# Patient Record
Sex: FEMALE | Race: WHITE
[De-identification: names, ages, dates, MRNs, and addresses within clinical notes are randomized per-mention and may not be internally consistent; named-entity substitution may affect disease eponyms.]

---

## 2018-01-17 ENCOUNTER — HOSPITAL ENCOUNTER (EMERGENCY)
Dept: HOSPITAL 62 - ER | Age: 73
Discharge: HOME | End: 2018-01-17
Payer: MEDICARE

## 2018-01-17 VITALS — DIASTOLIC BLOOD PRESSURE: 67 MMHG | SYSTOLIC BLOOD PRESSURE: 168 MMHG

## 2018-01-17 DIAGNOSIS — D64.9: ICD-10-CM

## 2018-01-17 DIAGNOSIS — Z87.891: ICD-10-CM

## 2018-01-17 DIAGNOSIS — I50.9: ICD-10-CM

## 2018-01-17 DIAGNOSIS — I42.9: ICD-10-CM

## 2018-01-17 DIAGNOSIS — R05: ICD-10-CM

## 2018-01-17 DIAGNOSIS — J44.9: ICD-10-CM

## 2018-01-17 DIAGNOSIS — R06.00: Primary | ICD-10-CM

## 2018-01-17 LAB
A TYPE INFLUENZA AG: NEGATIVE
ADD MANUAL DIFF: NO
ALBUMIN SERPL-MCNC: 4.7 G/DL (ref 3.5–5)
ALP SERPL-CCNC: 53 U/L (ref 38–126)
ALT SERPL-CCNC: 32 U/L (ref 9–52)
ANION GAP SERPL CALC-SCNC: 10 MMOL/L (ref 5–19)
AST SERPL-CCNC: 38 U/L (ref 14–36)
B INFLUENZA AG: NEGATIVE
BASOPHILS # BLD AUTO: 0.1 10^3/UL (ref 0–0.2)
BASOPHILS NFR BLD AUTO: 0.9 % (ref 0–2)
BILIRUB DIRECT SERPL-MCNC: 0.4 MG/DL (ref 0–0.4)
BILIRUB SERPL-MCNC: 0.5 MG/DL (ref 0.2–1.3)
BUN SERPL-MCNC: 14 MG/DL (ref 7–20)
CALCIUM: 10.5 MG/DL (ref 8.4–10.2)
CHLORIDE SERPL-SCNC: 100 MMOL/L (ref 98–107)
CK MB SERPL-MCNC: 0.56 NG/ML (ref ?–4.55)
CO2 SERPL-SCNC: 30 MMOL/L (ref 22–30)
EOSINOPHIL # BLD AUTO: 0.1 10^3/UL (ref 0–0.6)
EOSINOPHIL NFR BLD AUTO: 1.9 % (ref 0–6)
ERYTHROCYTE [DISTWIDTH] IN BLOOD BY AUTOMATED COUNT: 13.8 % (ref 11.5–14)
GLUCOSE SERPL-MCNC: 103 MG/DL (ref 75–110)
HCT VFR BLD CALC: 37.1 % (ref 36–47)
HGB BLD-MCNC: 12.3 G/DL (ref 12–15.5)
LYMPHOCYTES # BLD AUTO: 0.6 10^3/UL (ref 0.5–4.7)
LYMPHOCYTES NFR BLD AUTO: 10 % (ref 13–45)
MCH RBC QN AUTO: 29.1 PG (ref 27–33.4)
MCHC RBC AUTO-ENTMCNC: 33.2 G/DL (ref 32–36)
MCV RBC AUTO: 88 FL (ref 80–97)
MONOCYTES # BLD AUTO: 0.8 10^3/UL (ref 0.1–1.4)
MONOCYTES NFR BLD AUTO: 13.5 % (ref 3–13)
NEUTROPHILS # BLD AUTO: 4.3 10^3/UL (ref 1.7–8.2)
NEUTS SEG NFR BLD AUTO: 73.7 % (ref 42–78)
NT PRO BNP: 1330 PG/ML (ref 5–900)
PLATELET # BLD: 218 10^3/UL (ref 150–450)
POTASSIUM SERPL-SCNC: 3.9 MMOL/L (ref 3.6–5)
PROT SERPL-MCNC: 7.2 G/DL (ref 6.3–8.2)
RBC # BLD AUTO: 4.24 10^6/UL (ref 3.72–5.28)
SODIUM SERPL-SCNC: 140.4 MMOL/L (ref 137–145)
TOTAL CELLS COUNTED % (AUTO): 100 %
TROPONIN I SERPL-MCNC: 0.04 NG/ML
WBC # BLD AUTO: 5.9 10^3/UL (ref 4–10.5)

## 2018-01-17 PROCEDURE — 71275 CT ANGIOGRAPHY CHEST: CPT

## 2018-01-17 PROCEDURE — 96365 THER/PROPH/DIAG IV INF INIT: CPT

## 2018-01-17 PROCEDURE — 93010 ELECTROCARDIOGRAM REPORT: CPT

## 2018-01-17 PROCEDURE — 94640 AIRWAY INHALATION TREATMENT: CPT

## 2018-01-17 PROCEDURE — 82553 CREATINE MB FRACTION: CPT

## 2018-01-17 PROCEDURE — 87804 INFLUENZA ASSAY W/OPTIC: CPT

## 2018-01-17 PROCEDURE — 99285 EMERGENCY DEPT VISIT HI MDM: CPT

## 2018-01-17 PROCEDURE — 84484 ASSAY OF TROPONIN QUANT: CPT

## 2018-01-17 PROCEDURE — 80053 COMPREHEN METABOLIC PANEL: CPT

## 2018-01-17 PROCEDURE — 71046 X-RAY EXAM CHEST 2 VIEWS: CPT

## 2018-01-17 PROCEDURE — 83880 ASSAY OF NATRIURETIC PEPTIDE: CPT

## 2018-01-17 PROCEDURE — 82550 ASSAY OF CK (CPK): CPT

## 2018-01-17 PROCEDURE — 87040 BLOOD CULTURE FOR BACTERIA: CPT

## 2018-01-17 PROCEDURE — 85025 COMPLETE CBC W/AUTO DIFF WBC: CPT

## 2018-01-17 PROCEDURE — 36415 COLL VENOUS BLD VENIPUNCTURE: CPT

## 2018-01-17 PROCEDURE — 87077 CULTURE AEROBIC IDENTIFY: CPT

## 2018-01-17 PROCEDURE — 93005 ELECTROCARDIOGRAM TRACING: CPT

## 2018-01-17 PROCEDURE — 87186 SC STD MICRODIL/AGAR DIL: CPT

## 2018-01-17 NOTE — RADIOLOGY REPORT (SQ)
EXAM DESCRIPTION:  CHEST PA/LAT



COMPLETED DATE/TIME:  1/17/2018 1:38 pm



REASON FOR STUDY:  congested cough, wheezes, left chest pain



COMPARISON:  None.



EXAM PARAMETERS:  NUMBER OF VIEWS: two views

TECHNIQUE: Digital Frontal and Lateral radiographic views of the chest acquired.

RADIATION DOSE: NA

LIMITATIONS: none



FINDINGS:  LUNGS AND PLEURA: There is ill-defined increased density in the right lung base which coul
d represent a developing infiltrate or atelectatic changes.  Remaining lung fields are clear.  No ple
ural effusions are identified.

MEDIASTINUM AND HILAR STRUCTURES: No masses or contour abnormalities.

HEART AND VASCULAR STRUCTURES: Heart normal size.  No evidence for failure.

BONES: No acute findings.

HARDWARE: AICD device is identified in position.

OTHER: No other significant finding.



IMPRESSION:  Ill-defined right basilar density which could represent a developing infiltrate or atele
ctatic changes.  Remaining lung fields are clear.



TECHNICAL DOCUMENTATION:  JOB ID:  [id number]

 2011 [employer]- All Rights Reserved

## 2018-01-17 NOTE — RADIOLOGY REPORT (SQ)
EXAM DESCRIPTION:  CTA CHEST



COMPLETED DATE/TIME:  1/17/2018 10:12 pm



REASON FOR STUDY:  sob



COMPARISON:  Recent radiographs.



TECHNIQUE:  CT scan of the chest performed using helical scanning technique with dynamic intravenous 
contrast injection.  Images reviewed with lung, soft tissue and bone windows.  Reconstructed coronal 
and sagittal MPR images reviewed.

Additional 3 dimensional post-processing performed to develop Maximal Intensity Projection images (MI
P).  All images stored on PACS.

All CT scanners at this facility use dose modulation, iterative reconstruction, and/or weight based d
osing when appropriate to reduce radiation dose to as low as reasonably achievable (ALARA).

[facility]: Dose Right  [facility]: CareDose    [facility]: Dose Right    [facility]: Teradose 4D    [facility]: Smart Technologies



CONTRAST TYPE AND DOSE:  contrast/concentration: Isovue 370.00 mg/ml; Total Contrast Delivered: 100.0
 ml; Total Saline Delivered: 60.0 ml

Contrast bolus adequate for pulmonary arteries and aorta.



RENAL FUNCTION:  Creatinine 0.95



RADIATION DOSE:  CT Rad equipment meets quality standard of care and radiation dose reduction techniq
ues were employed. CTDIvol: 9.9 - 15.0 mGy. DLP: 543 mGy-cm. .



LIMITATIONS:  None.



FINDINGS:  LUNGS AND PLEURA: 4 mm subpleural right lower lobe nodule.  Possible 6 mm left upper lobe 
nodule.  Mild motion artifact.  No pleural effusion or calcification.

AORTA AND GREAT VESSELS: No gross aortic dissection or aneurysm.  Ectatic ascending aorta at 3.7 cm.

HEART: Cardiac enlargement.  Coronary and valvular calcification.  No pericardial effusion.

PULMONARY ARTERIES: No emboli visualized in the main pulmonary arteries or the segmental branches.

HILAR AND MEDIASTINAL STRUCTURES: Nonspecific mild adenopathy in the mediastinum and hilar stations. 
 Nodes measure up to just over 1 cm in short axis.

HARDWARE: Left transvenous pacer.

UPPER ABDOMEN: No significant findings.  Limited exam.

THYROID AND OTHER SOFT TISSUES: No masses.  No adenopathy.

BONES: No acute or significant finding.

3D MIPS: Confirm above findings.

OTHER: No other significant finding.



IMPRESSION:  1. No pulmonary embolus detected.  No acute lung infiltrates.  2. Cardiac enlargement wi
th mild aortic ectasia but no aneurysm or dissection.  3. Pulmonary nodules measuring up to 6 mm.  Fo
llow-up guidelines are noted below.

 F[place]ISCHNER CRITERIA FOR FOLLOW-UP OF PULMONARY NODULES

Incidentally detected new nodules in persons 35 or older.

HIGH RISK: History of smoking or other known risk factors.

6-8mm single solid nodule: LOW RISK: CT 6-12 mo; then consider CT 18-24 mo. HIGH RISK: CT 6-12 mo; th
en CT 18-24 mo.



COMMENT:  Quality ID # [id number]: Final reports with documentation of one or more dose reduction techniques
 (e.g., Automated exposure control, adjustment of the mA and/or kV according to patient size, use of 
iterative reconstruction technique)



TECHNICAL DOCUMENTATION:  JOB ID:  [id number]

 2011 [employer]- All Rights Reserved

## 2018-01-17 NOTE — ER DOCUMENT REPORT
ED General





- General


Chief Complaint: Breathing Difficulty


Stated Complaint: DIFFICULTY BREATHING WHEEZING


Time Seen by Provider: 01/17/18 13:17


Mode of Arrival: Ambulatory


Information source: Patient


Notes: 





This is a 72-year-old female with a history of CHF, cardiomyopathy (status post 

ICD), mild COPD, anemia.  Patient presents to the emergency room because of 

cough which is been nonproductive, shortness of breath over the last day, 

wheezing subjective chills.  Patient states that she had a very difficult time 

breathing last night.


TRAVEL OUTSIDE OF THE [place] IN LAST 30 DAYS: No





- HPI


Onset: Last week


Onset/Duration: Gradual


Quality of pain: No pain


Severity: None


Pain Level: Denies


Associated symptoms: Nonproductive cough, Shortness of breath.  denies: Fever


Exacerbated by: Denies


Relieved by: Denies


Similar symptoms previously: Yes


Recently seen / treated by doctor: No





- Related Data


Allergies/Adverse Reactions: 


 





levofloxacin [From Levaquin] Allergy (Severe, Verified 01/17/18 13:06)


 Tachycardia


gabapentin [From Neurontin] Allergy (Intermediate, Verified 01/17/18 13:06)


 CONFUSED/TREMORS


polymyxin B sulfate [From Polysporin] Allergy (Mild, Verified 01/17/18 13:06)


 IRRITATION, HIVES











Past Medical History





- General


Information source: Patient





- Social History


Smoking Status: Former Smoker


Cigarette use (# per day): No


Chew tobacco use (# tins/day): No


Frequency of alcohol use: None


Drug Abuse: None


Lives with: Family


Family History: Reviewed & Not Pertinent


Patient has suicidal ideation: No


Patient has homicidal ideation: No





- Past Medical History


Cardiac Medical History: Reports: Hx Atrial Fibrillation, Hx Congestive Heart 

Failure, Hx Hypercholesterolemia, Hx Hypertension - MEDS


   Denies: Hx Heart Attack


Pulmonary Medical History: 


   Denies: Hx Asthma


Neurological Medical History: Denies: Hx Cerebrovascular Accident, Hx Seizures


Renal/ Medical History: Denies: Hx Peritoneal Dialysis


GI Medical History: Reports: Hx Gastroesophageal Reflux Disease, Hx Hiatal 

Hernia - YEARS AGO.  Denies: Hx Hepatitis, Hx Ulcer


Infectious Medical History: Denies: Hx Hepatitis


Past Surgical History: Reports: Hx Hysterectomy, Hx Pacemaker - 2008.  Denies: 

Hx Mastectomy, Hx Open Heart Surgery





- Immunizations


Hx Diphtheria, Pertussis, Tetanus Vaccination: Yes





Review of Systems





- Review of Systems


Constitutional: Chills.  denies: Fever


EENT: No symptoms reported


Cardiovascular: See HPI


Respiratory: Cough, Short of breath


Gastrointestinal: No symptoms reported


Genitourinary: No symptoms reported


Female Genitourinary: No symptoms reported


Musculoskeletal: No symptoms reported


Skin: No symptoms reported


Hematologic/Lymphatic: No symptoms reported


Neurological/Psychological: No symptoms reported





Physical Exam





- Vital signs


Vitals: 


 











Temp Pulse Resp BP Pulse Ox


 


 98.3 F   71   17   141/67 H  94 


 


 01/17/18 12:07  01/17/18 12:07  01/17/18 12:07  01/17/18 12:07  01/17/18 12:07











Notes: 





Physical exam:


 


GENERAL: 72-year-old female, alert and oriented 3, no acute distress





HEAD: Atraumatic, normocephalic.





EYES: Pupils equal round and reactive to light, extraocular movements intact, 

sclera anicteric, conjunctiva are normal.





ENT: TMs normal, nares patent, oropharynx clear without exudates.  Moist mucous 

membranes.





NECK: Normal range of motion, supple without obvious mass or JVD.





LUNGS: Wheezes bilaterally





HEART: Regular rate and rhythm without murmurs, rubs or gallops.





ABDOMEN: Soft, normoactive bowel sounds.  No tenderness to palpation.  No 

guarding, no rebound.  No masses appreciated.





EXTREMITIES: Normal range of motion, no pitting or edema.  No clubbing or 

cyanosis.





NEUROLOGICAL: Cranial nerves II through XII grossly intact.  Normal speech, 

moving all extremities.





PSYCH: Normal mood, normal affect.





SKIN: Warm, Dry, normal turgor, no rashes or lesions noted.





Course





- Re-evaluation


Re-evalutation: 





01/17/18 23:03


Note: The patient states she is feeling better.  I did give her a gram of IV 

ceftriaxone for the possibility of pneumonia.  CTA of the chest shows no 

pulmonary emboli and no pneumonia.  There are some pulmonary nodules which are 

going to need to be followed.  I discussed this with her and her son and I will 

give her a copy of today's CTA scan to bring to Dr. lynn's office.  I will 

give her a copy of all other medicines.





- Vital Signs


Vital signs: 


 











Temp Pulse Resp BP Pulse Ox


 


 98.6 F   64   16   168/67 H  96 


 


 01/17/18 23:22  01/17/18 16:56  01/17/18 23:22  01/17/18 23:22  01/17/18 23:22














- Laboratory


Result Diagrams: 


 01/17/18 13:48





 01/17/18 13:48


Laboratory results interpreted by me: 


 











  01/17/18 01/17/18 01/17/18





  13:48 13:48 13:48


 


Lymphocytes %  10.0 L  


 


Monocytes %  13.5 H  


 


Est GFR (Non-Af Amer)   58 L 


 


Calcium   10.5 H 


 


AST   38 H 


 


NT-Pro-B Natriuret Pep    1330 H














- Diagnostic Test


Radiology reviewed: Image reviewed, Reports reviewed - PA shows no pulmonary 

emboli.





Discharge





- Discharge


Clinical Impression: 


 Dyspnea





Condition: Stable


Disposition: HOME, SELF-CARE


Additional Instructions: 


As we discussed, the CT showed no evidence of pneumonia.  Her, I do want you to 

take the antibiotic to cover you for respiratory infection.  He did receive IV 

ceftriaxone in the ER and that will last 24 hours.  I want you to continue your 

current medicines.


Follow-up with your primary care doctor and bring a copy of the labs and CT 

report with you.


As we discussed, the CT scan did not show any obvious pneumonia and it did not 

show any blood clots.  It did show an enlarged heart which we knew you had.  It 

did show some pulmonary nodules what that you are going to have to follow-up in 

6 months.





Prescriptions: 


Doxycycline Hyclate 100 mg PO BID #20 capsule


Referrals: 


OLIVA HESS MD [Primary Care Provider] - Follow up as needed

## 2018-01-17 NOTE — ER DOCUMENT REPORT
ED Medical Screen (RME)





- General


Chief Complaint: Breathing Difficulty


Stated Complaint: DIFFICULTY BREATHING WHEEZING


Time Seen by Provider: 01/17/18 13:17


Notes: 





72-year-old female patient reports onset last night wheezing cough congestion 

trouble breathing and pleuritic pain in her left chest.  Has not checked for 

fever.


Brief exam shows some rhonchi in the left lung on forced cough.  There is 

minimal wheeze.





I have greeted and performed a rapid initial assessment of this patient.  A 

comprehensive ED assessment and evaluation of the patient, analysis of test 

results and completion of the medical decision making process will be conducted 

by additional ED providers.


TRAVEL OUTSIDE OF THE [place] IN LAST 30 DAYS: No





- Related Data


Allergies/Adverse Reactions: 


 





levofloxacin [From Levaquin] Allergy (Severe, Verified 01/17/18 13:06)


 Tachycardia


gabapentin [From Neurontin] Allergy (Intermediate, Verified 01/17/18 13:06)


 CONFUSED/TREMORS


polymyxin B sulfate [From Polysporin] Allergy (Mild, Verified 01/17/18 13:06)


 IRRITATION, HIVES











Past Medical History





- Social History


Chew tobacco use (# tins/day): No


Frequency of alcohol use: None


Drug Abuse: None





- Past Medical History


Cardiac Medical History: Reports: Hx Atrial Fibrillation, Hx Congestive Heart 

Failure, Hx Hypercholesterolemia, Hx Hypertension - MEDS


   Denies: Hx Heart Attack


Pulmonary Medical History: 


   Denies: Hx Asthma


Neurological Medical History: Denies: Hx Cerebrovascular Accident, Hx Seizures


Renal/ Medical History: Denies: Hx Peritoneal Dialysis


GI Medical History: Reports: Hx Gastroesophageal Reflux Disease, Hx Hiatal 

Hernia - YEARS AGO.  Denies: Hx Hepatitis, Hx Ulcer


Infectious Medical History: Denies: Hx Hepatitis


Past Surgical History: Reports: Hx Hysterectomy, Hx Pacemaker - 2008.  Denies: 

Hx Mastectomy, Hx Open Heart Surgery





- Immunizations


Hx Diphtheria, Pertussis, Tetanus Vaccination: Yes





Physical Exam





- Vital signs


Vitals: 


 











Temp Pulse Resp BP Pulse Ox


 


 98.3 F   71   17   141/67 H  94 


 


 01/17/18 12:07  01/17/18 12:07  01/17/18 12:07  01/17/18 12:07  01/17/18 12:07














Course





- Vital Signs


Vital signs: 


 











Temp Pulse Resp BP Pulse Ox


 


 98.3 F   71   17   141/67 H  94 


 


 01/17/18 12:07  01/17/18 12:07  01/17/18 12:07  01/17/18 12:07  01/17/18 12:07

## 2018-01-17 NOTE — EKG REPORT
SEVERITY:- ABNORMAL ECG -

ATRIAL-SENSED VENTRICULAR-PACED COMPLEXES

LVH WITH IVCD, LAD AND SECONDARY REPOL ABNRM

:

Confirmed by: Jet Pal 17-Jan-2018 23:24:57

## 2018-09-18 ENCOUNTER — HOSPITAL ENCOUNTER (INPATIENT)
Dept: HOSPITAL 62 - ER | Age: 73
LOS: 3 days | Discharge: HOME | DRG: 603 | End: 2018-09-21
Attending: INTERNAL MEDICINE | Admitting: INTERNAL MEDICINE
Payer: MEDICARE

## 2018-09-18 DIAGNOSIS — Z87.891: ICD-10-CM

## 2018-09-18 DIAGNOSIS — K21.9: ICD-10-CM

## 2018-09-18 DIAGNOSIS — G89.29: ICD-10-CM

## 2018-09-18 DIAGNOSIS — E03.9: ICD-10-CM

## 2018-09-18 DIAGNOSIS — Z90.49: ICD-10-CM

## 2018-09-18 DIAGNOSIS — Z80.8: ICD-10-CM

## 2018-09-18 DIAGNOSIS — Z79.82: ICD-10-CM
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
## 2018-09-18 DIAGNOSIS — Z90.710: ICD-10-CM

## 2018-09-18 DIAGNOSIS — I44.7: ICD-10-CM

## 2018-09-18 DIAGNOSIS — Z82.3: ICD-10-CM

## 2018-09-18 DIAGNOSIS — S61.452A: ICD-10-CM

## 2018-09-18 DIAGNOSIS — I50.42: ICD-10-CM

## 2018-09-18 DIAGNOSIS — Z82.49: ICD-10-CM

## 2018-09-18 DIAGNOSIS — Z88.6: ICD-10-CM

## 2018-09-18 DIAGNOSIS — L03.114: Primary | ICD-10-CM

## 2018-09-18 DIAGNOSIS — M19.90: ICD-10-CM

## 2018-09-18 DIAGNOSIS — E78.00: ICD-10-CM

## 2018-09-18 DIAGNOSIS — Z88.8: ICD-10-CM

## 2018-09-18 DIAGNOSIS — Z95.0: ICD-10-CM

## 2018-09-18 DIAGNOSIS — M54.9: ICD-10-CM

## 2018-09-18 DIAGNOSIS — J44.9: ICD-10-CM

## 2018-09-18 DIAGNOSIS — Z88.3: ICD-10-CM

## 2018-09-18 DIAGNOSIS — I11.0: ICD-10-CM

## 2018-09-18 DIAGNOSIS — Z79.899: ICD-10-CM

## 2018-09-18 LAB
ADD MANUAL DIFF: NO
ANION GAP SERPL CALC-SCNC: 10 MMOL/L (ref 5–19)
BASOPHILS # BLD AUTO: 0.1 10^3/UL (ref 0–0.2)
BASOPHILS NFR BLD AUTO: 0.8 % (ref 0–2)
BUN SERPL-MCNC: 24 MG/DL (ref 7–20)
CALCIUM: 10 MG/DL (ref 8.4–10.2)
CHLORIDE SERPL-SCNC: 99 MMOL/L (ref 98–107)
CO2 SERPL-SCNC: 29 MMOL/L (ref 22–30)
EOSINOPHIL # BLD AUTO: 0.3 10^3/UL (ref 0–0.6)
EOSINOPHIL NFR BLD AUTO: 3 % (ref 0–6)
ERYTHROCYTE [DISTWIDTH] IN BLOOD BY AUTOMATED COUNT: 15.7 % (ref 11.5–14)
GLUCOSE SERPL-MCNC: 98 MG/DL (ref 75–110)
HCT VFR BLD CALC: 28.5 % (ref 36–47)
HGB BLD-MCNC: 8.7 G/DL (ref 12–15.5)
LYMPHOCYTES # BLD AUTO: 1.4 10^3/UL (ref 0.5–4.7)
LYMPHOCYTES NFR BLD AUTO: 12.8 % (ref 13–45)
MCH RBC QN AUTO: 22.2 PG (ref 27–33.4)
MCHC RBC AUTO-ENTMCNC: 30.5 G/DL (ref 32–36)
MCV RBC AUTO: 73 FL (ref 80–97)
MONOCYTES # BLD AUTO: 1.1 10^3/UL (ref 0.1–1.4)
MONOCYTES NFR BLD AUTO: 9.7 % (ref 3–13)
NEUTROPHILS # BLD AUTO: 8.2 10^3/UL (ref 1.7–8.2)
NEUTS SEG NFR BLD AUTO: 73.7 % (ref 42–78)
PLATELET # BLD: 223 10^3/UL (ref 150–450)
POTASSIUM SERPL-SCNC: 4.4 MMOL/L (ref 3.6–5)
RBC # BLD AUTO: 3.91 10^6/UL (ref 3.72–5.28)
SODIUM SERPL-SCNC: 138.3 MMOL/L (ref 137–145)
TOTAL CELLS COUNTED % (AUTO): 100 %
WBC # BLD AUTO: 11.1 10^3/UL (ref 4–10.5)

## 2018-09-18 PROCEDURE — 84443 ASSAY THYROID STIM HORMONE: CPT

## 2018-09-18 PROCEDURE — 90471 IMMUNIZATION ADMIN: CPT

## 2018-09-18 PROCEDURE — 99284 EMERGENCY DEPT VISIT MOD MDM: CPT

## 2018-09-18 PROCEDURE — 87040 BLOOD CULTURE FOR BACTERIA: CPT

## 2018-09-18 PROCEDURE — 3E0234Z INTRODUCTION OF SERUM, TOXOID AND VACCINE INTO MUSCLE, PERCUTANEOUS APPROACH: ICD-10-PCS | Performed by: FAMILY MEDICINE

## 2018-09-18 PROCEDURE — 96374 THER/PROPH/DIAG INJ IV PUSH: CPT

## 2018-09-18 PROCEDURE — 36415 COLL VENOUS BLD VENIPUNCTURE: CPT

## 2018-09-18 PROCEDURE — 90715 TDAP VACCINE 7 YRS/> IM: CPT

## 2018-09-18 PROCEDURE — 85025 COMPLETE CBC W/AUTO DIFF WBC: CPT

## 2018-09-18 PROCEDURE — 80048 BASIC METABOLIC PNL TOTAL CA: CPT

## 2018-09-18 RX ADMIN — BENAZEPRIL HYDROCHLORIDE SCH MG: 5 TABLET ORAL at 22:29

## 2018-09-18 RX ADMIN — OXYCODONE HYDROCHLORIDE SCH MG: 10 TABLET, FILM COATED, EXTENDED RELEASE ORAL at 22:29

## 2018-09-18 RX ADMIN — TEMAZEPAM SCH MG: 15 CAPSULE ORAL at 22:28

## 2018-09-18 RX ADMIN — METOCLOPRAMIDE SCH MG: 5 INJECTION, SOLUTION INTRAMUSCULAR; INTRAVENOUS at 22:31

## 2018-09-18 RX ADMIN — Medication SCH ML: at 22:32

## 2018-09-18 RX ADMIN — POTASSIUM CHLORIDE SCH MEQ: 750 TABLET, FILM COATED, EXTENDED RELEASE ORAL at 22:28

## 2018-09-18 NOTE — ER DOCUMENT REPORT
ED General





- General


Chief Complaint: Cat Bite


Stated Complaint: CAT BITE/LEFT HAND REDNESS, SWELLING


Time Seen by Provider: 09/18/18 16:53


Mode of Arrival: Ambulatory


Information source: Patient, [facility] Records


Notes: 


73-year-old female with congestive heart failure, hypertension, cardiomyopathy 

presents with complaint of left hand pain, swelling and erythema.  Patient 

states that she was bit by her cat yesterday and awoke this morning with pain 

and swelling.  Cat is reported to be fully vaccinated.  Patient is right-hand 

dominant.  She denies any associated fever, chills, nausea, vomiting.  


TRAVEL OUTSIDE OF THE [place] IN LAST 30 DAYS: No





- HPI


Onset: Yesterday


Onset/Duration: Sudden


Quality of pain: Achy, Throbbing


Severity: Mild


Associated symptoms: denies: Chest pain, Fever, Nausea


Exacerbated by: Denies


Relieved by: Denies


Similar symptoms previously: No


Recently seen / treated by doctor: No





- Related Data


Allergies/Adverse Reactions: 


 





levofloxacin [From Levaquin] Allergy (Severe, Verified 09/18/18 15:04)


 Tachycardia


gabapentin [From Neurontin] Allergy (Intermediate, Verified 09/18/18 15:04)


 CONFUSED/TREMORS


polymyxin B sulfate [From Polysporin] Allergy (Mild, Verified 09/18/18 15:04)


 IRRITATION, HIVES











Past Medical History





- General


Information source: Patient





- Social History


Smoking Status: Former Smoker


Chew tobacco use (# tins/day): No


Frequency of alcohol use: None


Drug Abuse: None


Lives with: Family


Family History: Reviewed & Not Pertinent


Patient has suicidal ideation: No


Patient has homicidal ideation: No





- Past Medical History


Cardiac Medical History: Reports: Hx Atrial Fibrillation, Hx Congestive Heart 

Failure, Hx Hypercholesterolemia, Hx Hypertension - MEDS


   Denies: Hx Heart Attack


Pulmonary Medical History: 


   Denies: Hx Asthma


Neurological Medical History: Denies: Hx Cerebrovascular Accident, Hx Seizures


Renal/ Medical History: Denies: Hx Peritoneal Dialysis


GI Medical History: Reports: Hx Gastroesophageal Reflux Disease, Hx Hiatal 

Hernia - YEARS AGO.  Denies: Hx Hepatitis, Hx Ulcer


Musculoskeletal Medical History: Reports Hx Arthritis


Infectious Medical History: Denies: Hx Hepatitis


Past Surgical History: Reports: Hx Cardiac Surgery - ICD PACER/DEFIB [facility]

, Hx Hysterectomy, Hx Orthopedic Surgery - BACK, Hx Pacemaker - 2008.  Denies: 

Hx Mastectomy, Hx Open Heart Surgery





- Immunizations


Hx Diphtheria, Pertussis, Tetanus Vaccination: Yes





Review of Systems





- Review of Systems


Notes: 





REVIEW OF SYSTEMS:


CONSTITUTIONAL :  Denies fever,  chills, or sweats.  Denies recent illness. 

Denies weight loss, recent hospitalizations. 


EENT: Denies visual changes, eye pain.  Denies sore throat, oral lesions, 

difficulty swallowing.


CARDIOVASCULAR:  Denies chest pain.  Denies palpitations. Denies lower 

extremity edema.


RESPIRATORY:  Denies cough. Denies shortness of breath, wheezing.


GASTROINTESTINAL:  Denies abdominal pain or distention.  Denies nausea, vomiting

, or diarrhea.  Denies blood in vomitus, stools, or per rectum.  Denies black, 

tarry stools.  Denies constipation.  


GENITOURINARY:  Denies difficulty urinating, painful urination,  frequency, 

blood in urine, or  vaginal discharge.


MUSCULOSKELETAL:  Denies back or neck pain or stiffness.  Denies joint pain or 

swelling.


SKIN:   Denies rash, lesions or sores.


HEMATOLOGIC :   Denies easy bruising or bleeding.


LYMPHATIC:  Denies swollen glands.


NEUROLOGICAL:  Denies confusion or altered mental status.  Denies loss of 

consciousness.  Denies dizziness or lightheadedness.  Denies headache.  Denies 

problems difficulty with ambulation, slurred speech.  Denies sensory loss, 

numbness, or tingling.  Denies seizures.


PSYCHIATRIC:  Denies anxiety or stress.  Denies depression, suicidal ideation, 

or homicidal ideation.  Denies visual or auditory hallucinations.








Physical Exam





- Vital signs


Vitals: 


 











Temp Pulse Resp BP Pulse Ox


 


 98.3 F   67   20   138/59 H  99 


 


 09/18/18 15:40  09/18/18 15:40  09/18/18 15:40  09/18/18 15:40  09/18/18 15:40











Interpretation: Hypertensive.  No: Febrile





- Notes


Notes: 





PHYSICAL EXAMINATION:





GENERAL: Well-appearing, well-nourished and in no acute distress.





HEAD: Atraumatic, normocephalic.





EYES: Pupils equal round and reactive to light, extraocular movements intact, 

conjunctiva are normal.





ENT: Nares patent, oropharynx clear without exudates.  Moist mucous membranes.





NECK: Normal range of motion, supple without lymphadenopathy





LUNGS: Breath sounds clear to auscultation bilaterally and equal.  No wheezes 

rales or rhonchi.





HEART: Regular rate and rhythm without murmurs





ABDOMEN: Soft, nontender, nondistended abdomen.  No guarding, no rebound.  No 

masses appreciated.





Female : deferred





Musculoskeletal: Normal range of motion, no pitting or edema.  No cyanosis.  

Left hand-erythematous, swelling with streaking up the left forearm.





NEUROLOGICAL: Cranial nerves grossly intact.  Normal speech, normal gait.  

Normal sensory, motor exams





PSYCH: Normal mood, normal affect.





SKIN: Warm, Dry, normal turgor, no rashes or lesions noted.





Course





- Re-evaluation


Re-evalutation: 








Laboratory











  09/18/18 09/18/18





  18:00 18:00


 


WBC  11.1 H 


 


RBC  3.91 


 


Hgb  8.7 L 


 


Hct  28.5 L 


 


MCV  73 L 


 


MCH  22.2 L 


 


MCHC  30.5 L 


 


RDW  15.7 H 


 


Plt Count  223 


 


Seg Neutrophils %  73.7 


 


Lymphocytes %  12.8 L 


 


Monocytes %  9.7 


 


Eosinophils %  3.0 


 


Basophils %  0.8 


 


Absolute Neutrophils  8.2 


 


Absolute Lymphocytes  1.4 


 


Absolute Monocytes  1.1 


 


Absolute Eosinophils  0.3 


 


Absolute Basophils  0.1 


 


Sodium   138.3


 


Potassium   4.4


 


Chloride   99


 


Carbon Dioxide   29


 


Anion Gap   10


 


BUN   24 H


 


Creatinine   1.19


 


Est GFR ( Amer)   54 L


 


Est GFR (Non-Af Amer)   44 L


 


Glucose   98


 


Calcium   10.0











09/18/18 21:24


73-year-old female presents after being bitten by her cat yesterday.  On exam 

patient has extensive cellulitis including the hand, wrist and forearm.  

Patient was administered Zosyn from the provider in triage.  CBC is without 

leukocytosis, does show anemia.  BMP is without electrolyte abnormality.  She 

is agreeable with admission to the hospital, for IV antibiotics.  Patient 

accepted by Dr. Sprague for admission


09/19/18 01:15





09/19/18 01:15








- Vital Signs


Vital signs: 


 











Temp Pulse Resp BP Pulse Ox


 


 98.3 F   72   18   157/87 H  100 


 


 09/18/18 20:39  09/18/18 20:39  09/18/18 20:39  09/18/18 20:39  09/18/18 20:39














- Laboratory


Result Diagrams: 


 09/18/18 18:00





 09/18/18 18:00


Laboratory results interpreted by me: 


 











  09/18/18 09/18/18





  18:00 18:00


 


WBC  11.1 H 


 


Hgb  8.7 L 


 


Hct  28.5 L 


 


MCV  73 L 


 


MCH  22.2 L 


 


MCHC  30.5 L 


 


RDW  15.7 H 


 


Lymphocytes %  12.8 L 


 


BUN   24 H


 


Est GFR ( Amer)   54 L


 


Est GFR (Non-Af Amer)   44 L














Discharge





- Discharge


Clinical Impression: 


 Cellulitis of left arm, Swelling of left hand





Cat bite of hand


Qualifiers:


 Encounter type: initial encounter Laterality: left Qualified Code(s): S61.452A 

- Open bite of left hand, initial encounter





Condition: Good


Disposition: ADMITTED AS INPATIENT


Admitting Provider: Hospitalist


Unit Admitted: Medical Floor

## 2018-09-18 NOTE — ER DOCUMENT REPORT
ED Medical Screen (RME)





- General


Chief Complaint: Cat Bite


Stated Complaint: CAT BITE/LEFT HAND REDNESS, SWELLING


Time Seen by Provider: 09/18/18 16:53


Mode of Arrival: Ambulatory


Information source: Patient


Notes: 





Patient states that she was bit by her cat yesterday.  Patient complains of 

increased pain, swelling and redness to left arm.  Patient reports cat's 

immunizations are up-to-date.





hx: CHF, hypertension





I have greeted and performed a rapid initial assessment of this patient.  A 

comprehensive ED assessment and evaluation of the patient, analysis of test 

results and completion of the medical decision making process will be conducted 

by additional ED providers.


TRAVEL OUTSIDE OF THE [place] IN LAST 30 DAYS: No





- Related Data


Allergies/Adverse Reactions: 


 





levofloxacin [From Levaquin] Allergy (Severe, Verified 09/18/18 15:04)


 Tachycardia


gabapentin [From Neurontin] Allergy (Intermediate, Verified 09/18/18 15:04)


 CONFUSED/TREMORS


polymyxin B sulfate [From Polysporin] Allergy (Mild, Verified 09/18/18 15:04)


 IRRITATION, HIVES











Past Medical History





- Past Medical History


Cardiac Medical History: Reports: Hx Atrial Fibrillation, Hx Congestive Heart 

Failure, Hx Hypercholesterolemia, Hx Hypertension - MEDS


   Denies: Hx Heart Attack


Pulmonary Medical History: 


   Denies: Hx Asthma


Neurological Medical History: Denies: Hx Cerebrovascular Accident, Hx Seizures


Renal/ Medical History: Denies: Hx Peritoneal Dialysis


GI Medical History: Reports: Hx Gastroesophageal Reflux Disease, Hx Hiatal 

Hernia - YEARS AGO.  Denies: Hx Hepatitis, Hx Ulcer


Infectious Medical History: Denies: Hx Hepatitis


Past Surgical History: Reports: Hx Hysterectomy, Hx Pacemaker - 2008.  Denies: 

Hx Mastectomy, Hx Open Heart Surgery





- Immunizations


Hx Diphtheria, Pertussis, Tetanus Vaccination: Yes





Physical Exam





- Vital signs


Vitals: 





 











Temp Pulse Resp BP Pulse Ox


 


 98.3 F   67   20   138/59 H  99 


 


 09/18/18 15:40  09/18/18 15:40  09/18/18 15:40  09/18/18 15:40  09/18/18 15:40














- Extremities


General upper extremity: Tender - Tenderness to left hand and forearm.  Patient 

with bite to dorsal aspect of left hand with lymphangitis extending up left 

forearm and left upper arm.





Course





- Vital Signs


Vital signs: 





 











Temp Pulse Resp BP Pulse Ox


 


 98.3 F   67   20   138/59 H  99 


 


 09/18/18 15:40  09/18/18 15:40  09/18/18 15:40  09/18/18 15:40  09/18/18 15:40














Doctor's Discharge





- Discharge


Referrals: 


OLIVA HESS MD [Primary Care Provider] - Follow up as needed

## 2018-09-19 LAB
ADD MANUAL DIFF: NO
ANION GAP SERPL CALC-SCNC: 8 MMOL/L (ref 5–19)
BASOPHILS # BLD AUTO: 0.1 10^3/UL (ref 0–0.2)
BASOPHILS NFR BLD AUTO: 1 % (ref 0–2)
BUN SERPL-MCNC: 19 MG/DL (ref 7–20)
CALCIUM: 9.3 MG/DL (ref 8.4–10.2)
CHLORIDE SERPL-SCNC: 102 MMOL/L (ref 98–107)
CO2 SERPL-SCNC: 30 MMOL/L (ref 22–30)
EOSINOPHIL # BLD AUTO: 0.3 10^3/UL (ref 0–0.6)
EOSINOPHIL NFR BLD AUTO: 4.7 % (ref 0–6)
ERYTHROCYTE [DISTWIDTH] IN BLOOD BY AUTOMATED COUNT: 15.7 % (ref 11.5–14)
GLUCOSE SERPL-MCNC: 111 MG/DL (ref 75–110)
HCT VFR BLD CALC: 26.1 % (ref 36–47)
HGB BLD-MCNC: 8.2 G/DL (ref 12–15.5)
LYMPHOCYTES # BLD AUTO: 1.3 10^3/UL (ref 0.5–4.7)
LYMPHOCYTES NFR BLD AUTO: 17.6 % (ref 13–45)
MCH RBC QN AUTO: 22.7 PG (ref 27–33.4)
MCHC RBC AUTO-ENTMCNC: 31.6 G/DL (ref 32–36)
MCV RBC AUTO: 72 FL (ref 80–97)
MONOCYTES # BLD AUTO: 1 10^3/UL (ref 0.1–1.4)
MONOCYTES NFR BLD AUTO: 14.3 % (ref 3–13)
NEUTROPHILS # BLD AUTO: 4.5 10^3/UL (ref 1.7–8.2)
NEUTS SEG NFR BLD AUTO: 62.4 % (ref 42–78)
PLATELET # BLD: 191 10^3/UL (ref 150–450)
POTASSIUM SERPL-SCNC: 3.8 MMOL/L (ref 3.6–5)
RBC # BLD AUTO: 3.63 10^6/UL (ref 3.72–5.28)
SODIUM SERPL-SCNC: 139.7 MMOL/L (ref 137–145)
TOTAL CELLS COUNTED % (AUTO): 100 %
WBC # BLD AUTO: 7.2 10^3/UL (ref 4–10.5)

## 2018-09-19 RX ADMIN — CHLORTHALIDONE SCH MG: 25 TABLET ORAL at 10:19

## 2018-09-19 RX ADMIN — PREGABALIN SCH MG: 75 CAPSULE ORAL at 10:01

## 2018-09-19 RX ADMIN — POLYETHYLENE GLYCOL 3350 SCH GM: 17 POWDER, FOR SOLUTION ORAL at 10:01

## 2018-09-19 RX ADMIN — OXYCODONE AND ACETAMINOPHEN PRN TAB: 5; 325 TABLET ORAL at 13:10

## 2018-09-19 RX ADMIN — FENOFIBRATE SCH MG: 145 TABLET ORAL at 10:18

## 2018-09-19 RX ADMIN — ASPIRIN SCH MG: 81 TABLET, COATED ORAL at 10:01

## 2018-09-19 RX ADMIN — CARVEDILOL SCH MG: 12.5 TABLET, FILM COATED ORAL at 10:18

## 2018-09-19 RX ADMIN — CETIRIZINE HYDROCHLORIDE SCH MG: 10 TABLET, FILM COATED ORAL at 22:00

## 2018-09-19 RX ADMIN — SIMVASTATIN SCH MG: 40 TABLET, FILM COATED ORAL at 10:01

## 2018-09-19 RX ADMIN — ENOXAPARIN SODIUM SCH MG: 40 INJECTION SUBCUTANEOUS at 10:02

## 2018-09-19 RX ADMIN — CARVEDILOL SCH: 12.5 TABLET, FILM COATED ORAL at 02:19

## 2018-09-19 RX ADMIN — OXYCODONE HYDROCHLORIDE SCH MG: 10 TABLET, FILM COATED, EXTENDED RELEASE ORAL at 10:03

## 2018-09-19 RX ADMIN — AMPICILLIN SODIUM AND SULBACTAM SODIUM SCH MLS/HR: 2; 1 INJECTION, POWDER, FOR SOLUTION INTRAMUSCULAR; INTRAVENOUS at 21:44

## 2018-09-19 RX ADMIN — POTASSIUM CHLORIDE SCH MEQ: 750 TABLET, FILM COATED, EXTENDED RELEASE ORAL at 21:45

## 2018-09-19 RX ADMIN — AMPICILLIN SODIUM AND SULBACTAM SODIUM SCH MLS/HR: 2; 1 INJECTION, POWDER, FOR SOLUTION INTRAMUSCULAR; INTRAVENOUS at 13:09

## 2018-09-19 RX ADMIN — METOCLOPRAMIDE SCH: 5 INJECTION, SOLUTION INTRAMUSCULAR; INTRAVENOUS at 10:21

## 2018-09-19 RX ADMIN — CARVEDILOL SCH MG: 12.5 TABLET, FILM COATED ORAL at 21:45

## 2018-09-19 RX ADMIN — Medication SCH ML: at 16:48

## 2018-09-19 RX ADMIN — METOCLOPRAMIDE SCH MG: 5 INJECTION, SOLUTION INTRAMUSCULAR; INTRAVENOUS at 21:46

## 2018-09-19 RX ADMIN — BENAZEPRIL HYDROCHLORIDE SCH MG: 5 TABLET ORAL at 22:00

## 2018-09-19 RX ADMIN — CETIRIZINE HYDROCHLORIDE SCH: 10 TABLET, FILM COATED ORAL at 02:19

## 2018-09-19 RX ADMIN — OXYCODONE AND ACETAMINOPHEN PRN TAB: 5; 325 TABLET ORAL at 04:10

## 2018-09-19 RX ADMIN — AMPICILLIN SODIUM AND SULBACTAM SODIUM SCH GM: 2; 1 INJECTION, POWDER, FOR SOLUTION INTRAMUSCULAR; INTRAVENOUS at 05:37

## 2018-09-19 RX ADMIN — TEMAZEPAM SCH MG: 15 CAPSULE ORAL at 21:46

## 2018-09-19 RX ADMIN — METOCLOPRAMIDE SCH MG: 5 INJECTION, SOLUTION INTRAMUSCULAR; INTRAVENOUS at 10:01

## 2018-09-19 RX ADMIN — Medication SCH ML: at 21:46

## 2018-09-19 RX ADMIN — Medication SCH ML: at 05:36

## 2018-09-19 RX ADMIN — AMPICILLIN SODIUM AND SULBACTAM SODIUM SCH: 2; 1 INJECTION, POWDER, FOR SOLUTION INTRAMUSCULAR; INTRAVENOUS at 06:24

## 2018-09-19 RX ADMIN — METOCLOPRAMIDE SCH MG: 5 INJECTION, SOLUTION INTRAMUSCULAR; INTRAVENOUS at 16:48

## 2018-09-19 RX ADMIN — OXYCODONE HYDROCHLORIDE SCH MG: 10 TABLET, FILM COATED, EXTENDED RELEASE ORAL at 21:45

## 2018-09-19 NOTE — PDOC PROGRESS REPORT
Subjective


Progress Note for:: 09/19/18


Subjective:: 





MELISSA ANDERSEN is a 73 year old female who relates that on 9/17/18 at 5 in the 

morning she was with her cat in her bed, she tried to lift the cat to move her 

off the bed and the cat bit her on the proximal dorsum of her left hand.  On 9/ 18/18 she noted some swelling in the morning but by evening it was worse, very 

swollen, with a large area of erythema on the dorsum of her wrist spreading to 

her hand and forearm and also streaking up the inside of the left arm from the 

wrist to the axilla. She has an area of excoriation with a clear moisture like 

secretion,  warmth and tenderness to palpation.  She rates her pain as severe 8-

10 out of 10 worsened by palpation or movement of the left hand and radiating 

up into the forearm and elbow area from the wrist pain.  She denies having 

prior similar episodes and she has not identified any ameliorating factors or 

associated symptoms for her pain and swelling. She denies fever, chills, nausea 

and vomiting.  She is up-to-date on her vaccinations and she was started on IV 

Unasyn in the emergency room.


Reason For Visit: 


CAT BITE/left HAND CELLULITIS








Physical Exam


Vital Signs: 


 











Temp Pulse Resp BP Pulse Ox


 


 98.6 F   69   18   167/65 H  98 


 


 09/19/18 09:55  09/19/18 09:55  09/19/18 09:55  09/19/18 09:55  09/19/18 09:55











General appearance: PRESENT: no acute distress, cooperative


Head exam: PRESENT: atraumatic, normocephalic


Eye exam: PRESENT: conjunctiva pink.  ABSENT: conjunctival injection


Ear exam: PRESENT: normal external ear exam.  ABSENT: drainage


Mouth exam: PRESENT: moist, neck supple, tongue midline


Neck exam: ABSENT: thyromegaly, tracheal deviation


Respiratory exam: PRESENT: clear to auscultation sancho, symmetrical, unlabored


Cardiovascular exam: PRESENT: RRR.  ABSENT: clicks, diastolic murmur, gallop, 

rubs, systolic murmur


Pulses: PRESENT: normal radial pulses, normal dorsalis pedis pul


Vascular exam: PRESENT: normal capillary refill.  ABSENT: pallor


GI/Abdominal exam: PRESENT: normal bowel sounds, soft


Rectal exam: PRESENT: deferred


Extremities exam: PRESENT: tenderness - Dorsal left hand, left wrist and 

proximal left forearm., +2 edema - Left dorsal hand and wrist as well as the 

proximal left forearm


Musculoskeletal exam: PRESENT: ambulatory, other - Decreased range of motion 

left wrist and hand due to pain, increased warmth of the distal portion of the 

left forearm as well as the left wrist and hand on palpation with significant 

erythema present..  ABSENT: dislocation


Neurological exam: PRESENT: alert, awake, oriented to person, oriented to place

, oriented to time, oriented to situation, CN II-XII grossly intact.  ABSENT: 

motor sensory deficit


Psychiatric exam: PRESENT: appropriate affect, normal mood


Skin exam: ABSENT: jaundice, rash, urticaria





Results


Laboratory Results: 


 





 09/19/18 05:59 





 09/19/18 05:59 





 











  09/19/18 09/19/18





  05:59 05:59


 


WBC  7.2 


 


RBC  3.63 L 


 


Hgb  8.2 L 


 


Hct  26.1 L 


 


MCV  72 L 


 


MCH  22.7 L 


 


MCHC  31.6 L 


 


RDW  15.7 H 


 


Plt Count  191 


 


Seg Neutrophils %  62.4 


 


Lymphocytes %  17.6 


 


Monocytes %  14.3 H 


 


Eosinophils %  4.7 


 


Basophils %  1.0 


 


Absolute Neutrophils  4.5 


 


Absolute Lymphocytes  1.3 


 


Absolute Monocytes  1.0 


 


Absolute Eosinophils  0.3 


 


Absolute Basophils  0.1 


 


Sodium   139.7


 


Potassium   3.8


 


Chloride   102


 


Carbon Dioxide   30


 


Anion Gap   8


 


BUN   19


 


Creatinine   1.02


 


Est GFR ( Amer)   > 60


 


Est GFR (Non-Af Amer)   53 L


 


Glucose   111 H


 


Calcium   9.3














Assessment & Plan





- Diagnosis


(1) Cat bite of hand


Qualifiers: 


   Encounter type: initial encounter   Laterality: left   Qualified Code(s): 

S61.452A - Open bite of left hand, initial encounter; W55.01XA - Bitten by cat, 

initial encounter; W55.01XA - Bitten by cat, initial encounter   


Is this a current diagnosis for this admission?: Yes   


Plan: 


Ms. Andersen has already received her tetanus vaccination and is currently up-to-

date on all of her other vaccinations.  Her cat has been vaccinated for rabies 

and that vaccination is current.  Will be kept and observed for any signs of 

illness.








(2) Cellulitis of left arm


Is this a current diagnosis for this admission?: Yes   


Plan: 


Patient has been started on IV Unasyn which will be continued until her she 

shows an adequate response and she will be converted to oral Augmentin and 

should be able to be discharged home.  Surgical consultation will be obtained 

if a definitive abscess or area of necrosis develops.








- Time


Time Spent with patient: 25-34 minutes


Medications reviewed and adjusted accordingly: Yes


Anticipated discharge: Home


Within: within 72 hours

## 2018-09-19 NOTE — PDOC H&P
History of Present Illness


Admission Date/PCP: 


  09/18/18 20:17





  OLIVA HESS MD





Patient complains of: cat bite


History of Present Illness: 


MELISSA BATISTA is a 73 year old female with medical history that I will outline 

below.  Patient tells me that Monday at 5 in the morning she was with her cat 

in her bed, she tried to put the cut away and the cat bit her in her left hand.

  Yesterday she noted some swelling but today in the morning it was worse, very 

swollen, with a large area of erythema in the dorsum of her wrist going to her 

hand and forearm, she has an area of excoriation with a clear moisture like 

secretion; warmth, tender to palpation.  She has noticed that it was not 

getting any better and has been painful all night 8/10 intensity, decided to 

come to the emergency department.  Denies fever, chills, nausea, vomiting.  

States had is fully vaccinated.  She has been given IV Unasyn and tetanus 

vaccine.








Past Medical History


Cardiac Medical History: Reports: Congestive Heart Failure - EF 2530%, now is 

states is 50%, Hyperlipidema, Hypertension - MEDS


   Denies: Myocardial Infarction


Cardiac History Note: 





LBBB


Pulmonary Medical History: Reports: Chronic Obstructive Pulmonary Disease (COPD)


   Denies: Asthma


Neurological Medical History: 


   Denies: Seizures


Endocrine Medical History: Reports: Hypothyroidism


GI Medical History: Reports: Gastroesophageal Reflux Disease, Hiatal Hernia - 

YEARS AGO, Other - Functional bowel disease


   Denies: Hepatitis


Musculoskeltal Medical History: Reports: Arthritis, Other - Chronic back pain


Hematology: 


   Denies: Anemia, Sickle Cell Disease





Past Surgical History


Past Surgical History: 





Laparotomy with lysis of adhesions and partial small bowel resection


Past Surgical History: Reports: Cardiac Catheterization, Hysterectomy, Internal 

Defibrillator, Orthopedic Surgery - BACK, Pacemaker - 2008, Tonsillectomy, 

Other - Back surgery Aortofemoral bypass


   Denies: Amputation, Mastectomy





Social History


Lives with: Family


Smoking Status: Former Smoker - Quit in 1995


Frequency of Alcohol Use: None


Hx Recreational Drug Use: No


Hx Prescription Drug Abuse: No


Past Social History Note: 





Lives with her  who is disabled, she takes care of him





Family History


Family History: Reviewed & Not Pertinent


Family History: 





Father with history of pancreatic cancer, stroke, MI.  Mother with history of 

lung problems and cardiac problems


Parental Family History Reviewed: Yes - As above


Children Family History Reviewed: NA


Sibling(s) Family History Reviewed.: NA





Medication/Allergy


Home Medications: 








Aspirin [Ecotrin] 81 mg PO DAILY 09/18/18 


Benazepril HCl [Lotensin 5 mg Tablet] 10 mg PO QHS 09/18/18 


Carvedilol [Coreg 12.5 mg Tablet] 12.5 mg PO Q12 09/18/18 


Cetirizine HCl [Zyrtec 10 mg Tablet] 10 mg PO QHS 09/18/18 


Chlorthalidone [Chlorthalidone 25 mg Tablet] 25 mg PO DAILY 09/18/18 


Dexlansoprazole [Dexilant 60 mg Capsule] 60 mg PO DAILY 09/18/18 


Estradiol [Estrace] 1 mg PO QHS 09/18/18 


Fenofibrate Nanocrystallized [Tricor 145 mg Tablet] 145 mg PO DAILY 09/18/18 


Levothyroxine Sodium [Synthroid] 25 mcg PO QAM 09/18/18 


Oxycodone HCl [Oxycontin] 20 mg PO Q12 09/18/18 


Oxycodone HCl/Acetaminophen [Percocet 7.5-325 mg Tablet] 1 each PO Q6HP PRN 09/ 18/18 


Polyethylene Glycol 3350 [Miralax Powder 17 gm/Packet] 1 packet PO DAILY 09/18/ 18 


Potassium Chloride [Klor-Con M10] 20 meq PO QHS 09/18/18 


Pregabalin [Lyrica 75 mg Capsule] 75 mg PO DAILY 09/18/18 


Simvastatin [Zocor 40 mg Tablet] 40 mg PO DAILY 09/18/18 


Temazepam [Restoril 15 mg Capsule] 15 mg PO QHS 09/18/18 








Allergies/Adverse Reactions: 


 





levofloxacin [From Levaquin] Allergy (Severe, Verified 09/18/18 15:04)


 Tachycardia


gabapentin [From Neurontin] Allergy (Intermediate, Verified 09/18/18 15:04)


 CONFUSED/TREMORS


polymyxin B sulfate [From Polysporin] Allergy (Mild, Verified 09/18/18 15:04)


 IRRITATION, HIVES











Review of Systems


Review of Systems: 





As outlined in the HPI, others negative





Physical Exam


Vital Signs: 


 











Temp Pulse Resp BP Pulse Ox


 


 98.3 F   72   18   157/87 H  100 


 


 09/18/18 20:39  09/18/18 20:39  09/18/18 20:39  09/18/18 20:39  09/18/18 20:39











Additional comments: 





General appearance: Well-developed, well-nourished, alert and cooperative, and 

appears to be in no acute distress


Head: Normocephalic


Eyes: PEERL, EOMI, vision is grossly intact.  Ears: External auditory canal and 

tympanic membranes clear, hearing grossly intact.  Nose: No nasal discharge.  

Throat: Oral cavity and pharynx normal.  No inflammation, swelling, exudate or 

lesions.


Neck: Neck supple, nontender without lymphadenopathy, masses or thyromegaly.


Cardiac: Normal S1 and S2.  No S3, S4 or murmurs.  Rhythm is regular.  There is 

no peripheral edema, cyanosis or pallor.  Extremities are warm and well 

perfused.  Capillary refill is less than 2 seconds.  No carotid bruits.


Lungs: Clear to auscultation and percussion without rales, rhonchi, wheezing or 

diminished breath sounds.  Not using accessory muscles.


Abdomen: Positive bowel sounds.  Soft.  Nondistended, nontender.  No guarding 

or rebound.  No masses.  No hepatosplenomegaly


Extremities: Left hand with severe erythema in the dorsal area of the left 

wrist with an excoriation-like with no acute secretions, erythema with swelling 

extending to the dorsum of the hand and part of the forearm, warm and tender to 

palpation.


Neurological: Cranial nerves II through XII grossly intact.  Strength and 

sensation symmetric and intact throughout.  Reflexes 2+ throughout.


Skin: Skin abnormal as above


Psychiatric:  The mental examination revealed the patient was oriented to person

, place, and time.  The patient was able to demonstrate good judgment on recent

, without hallucinations, abnormal affect or abnormal behaviors.





Results


Laboratory Results: 





 











  09/18/18 09/18/18





  18:00 18:00


 


WBC  11.1 H 


 


RBC  3.91 


 


Hgb  8.7 L 


 


Hct  28.5 L 


 


MCV  73 L 


 


MCH  22.2 L 


 


MCHC  30.5 L 


 


RDW  15.7 H 


 


Plt Count  223 


 


Seg Neutrophils %  73.7 


 


Lymphocytes %  12.8 L 


 


Monocytes %  9.7 


 


Eosinophils %  3.0 


 


Basophils %  0.8 


 


Absolute Neutrophils  8.2 


 


Absolute Lymphocytes  1.4 


 


Absolute Monocytes  1.1 


 


Absolute Eosinophils  0.3 


 


Absolute Basophils  0.1 


 


Sodium   138.3


 


Potassium   4.4


 


Chloride   99


 


Carbon Dioxide   29


 


Anion Gap   10


 


BUN   24 H


 


Creatinine   1.19


 


Est GFR ( Amer)   54 L


 


Est GFR (Non-Af Amer)   44 L


 


Glucose   98


 


Calcium   10.0











Assessment & Plan





- Diagnosis


(1) Cat bite of hand


Qualifiers: 


   Encounter type: initial encounter   Laterality: left   Qualified Code(s): 

S61.452A - Open bite of left hand, initial encounter; W55.01XA - Bitten by cat, 

initial encounter; W55.01XA - Bitten by cat, initial encounter   


Is this a current diagnosis for this admission?: Yes   


Plan: 


Patient comes to the emergency department after being bitten by her cat Monday 

morning.  She has severe edema, erythema, tenderness in the left wrist/hand.  

Patient was given IV Unasyn and I will continue with this antibiotic.  I will 

ask also for a wound culture.  Please follow blood cultures.  Anticipate 

transition to p.o. antibiotics in 1 or 2 days.  She has full range of motion of 

the left wrist.








(2) Chronic combined systolic and diastolic CHF (congestive heart failure)


Is this a current diagnosis for this admission?: Yes   


Plan: 


Patient tells me that her EF was 25-30%, after pacemaker/ICD placed in 

increased to 5055%, the patient does not look fluid overload and seems well 

compensated.  Continue with home medications.








(3) Hypertension


Is this a current diagnosis for this admission?: Yes   


Plan: 


Continue with home antihypertensive medications.








(4) COPD (chronic obstructive pulmonary disease)


Is this a current diagnosis for this admission?: Yes   


Plan: 


Patient is not on home oxygen, she quit smoking 1995.  No respiratory symptoms.

  No active intervention








(5) Chronic back pain


Is this a current diagnosis for this admission?: Yes   


Plan: 


Patient is on home opioids, will resume them.








(6) Hypothyroidism


Is this a current diagnosis for this admission?: Yes   


Plan: 


Continue with Synthroid.








- Time


Time Spent: 30 to 50 Minutes





- Inpatient Certification


Based on my medical assessment, after consideration of the patient's 

comorbidities, presenting symptoms, or acuity I expect that the services needed 

warrant INPATIENT care.: Yes


I certify that my determination is in accordance with my understanding of 

Medicare's requirements for reasonable and necessary INPATIENT services [42 CFR 

412.3e].: Yes


Medical Necessity: Risk of Complication if Not Cared For in Hospital

## 2018-09-20 RX ADMIN — POTASSIUM CHLORIDE SCH MEQ: 750 TABLET, FILM COATED, EXTENDED RELEASE ORAL at 21:36

## 2018-09-20 RX ADMIN — POLYETHYLENE GLYCOL 3350 SCH GM: 17 POWDER, FOR SOLUTION ORAL at 09:43

## 2018-09-20 RX ADMIN — BENAZEPRIL HYDROCHLORIDE SCH MG: 5 TABLET ORAL at 21:36

## 2018-09-20 RX ADMIN — AMPICILLIN SODIUM AND SULBACTAM SODIUM SCH MLS/HR: 2; 1 INJECTION, POWDER, FOR SOLUTION INTRAMUSCULAR; INTRAVENOUS at 17:47

## 2018-09-20 RX ADMIN — OXYCODONE AND ACETAMINOPHEN PRN TAB: 5; 325 TABLET ORAL at 10:45

## 2018-09-20 RX ADMIN — METOCLOPRAMIDE SCH MG: 5 INJECTION, SOLUTION INTRAMUSCULAR; INTRAVENOUS at 09:43

## 2018-09-20 RX ADMIN — AMPICILLIN SODIUM AND SULBACTAM SODIUM SCH MLS/HR: 2; 1 INJECTION, POWDER, FOR SOLUTION INTRAMUSCULAR; INTRAVENOUS at 06:12

## 2018-09-20 RX ADMIN — OXYCODONE HYDROCHLORIDE SCH MG: 10 TABLET, FILM COATED, EXTENDED RELEASE ORAL at 21:35

## 2018-09-20 RX ADMIN — LANSOPRAZOLE SCH MG: 30 TABLET, ORALLY DISINTEGRATING, DELAYED RELEASE ORAL at 06:12

## 2018-09-20 RX ADMIN — METOCLOPRAMIDE SCH MG: 5 INJECTION, SOLUTION INTRAMUSCULAR; INTRAVENOUS at 21:35

## 2018-09-20 RX ADMIN — CARVEDILOL SCH MG: 12.5 TABLET, FILM COATED ORAL at 21:36

## 2018-09-20 RX ADMIN — AMPICILLIN SODIUM AND SULBACTAM SODIUM SCH MLS/HR: 2; 1 INJECTION, POWDER, FOR SOLUTION INTRAMUSCULAR; INTRAVENOUS at 12:49

## 2018-09-20 RX ADMIN — CARVEDILOL SCH MG: 12.5 TABLET, FILM COATED ORAL at 09:42

## 2018-09-20 RX ADMIN — CETIRIZINE HYDROCHLORIDE SCH MG: 10 TABLET, FILM COATED ORAL at 21:36

## 2018-09-20 RX ADMIN — ENOXAPARIN SODIUM SCH MG: 40 INJECTION SUBCUTANEOUS at 09:44

## 2018-09-20 RX ADMIN — OXYCODONE AND ACETAMINOPHEN PRN TAB: 5; 325 TABLET ORAL at 17:48

## 2018-09-20 RX ADMIN — FENOFIBRATE SCH MG: 145 TABLET ORAL at 09:42

## 2018-09-20 RX ADMIN — Medication SCH: at 06:01

## 2018-09-20 RX ADMIN — CHLORTHALIDONE SCH MG: 25 TABLET ORAL at 09:42

## 2018-09-20 RX ADMIN — AMPICILLIN SODIUM AND SULBACTAM SODIUM SCH MLS/HR: 2; 1 INJECTION, POWDER, FOR SOLUTION INTRAMUSCULAR; INTRAVENOUS at 00:06

## 2018-09-20 RX ADMIN — OXYCODONE AND ACETAMINOPHEN PRN TAB: 5; 325 TABLET ORAL at 00:45

## 2018-09-20 RX ADMIN — METOCLOPRAMIDE SCH MG: 5 INJECTION, SOLUTION INTRAMUSCULAR; INTRAVENOUS at 12:48

## 2018-09-20 RX ADMIN — METOCLOPRAMIDE SCH MG: 5 INJECTION, SOLUTION INTRAMUSCULAR; INTRAVENOUS at 17:47

## 2018-09-20 RX ADMIN — ASPIRIN SCH MG: 81 TABLET, COATED ORAL at 09:43

## 2018-09-20 RX ADMIN — SIMVASTATIN SCH MG: 40 TABLET, FILM COATED ORAL at 09:43

## 2018-09-20 RX ADMIN — OXYCODONE HYDROCHLORIDE SCH MG: 10 TABLET, FILM COATED, EXTENDED RELEASE ORAL at 09:43

## 2018-09-20 RX ADMIN — Medication SCH ML: at 21:35

## 2018-09-20 RX ADMIN — LEVOTHYROXINE SODIUM SCH MG: 25 TABLET ORAL at 06:11

## 2018-09-20 RX ADMIN — PREGABALIN SCH MG: 75 CAPSULE ORAL at 09:42

## 2018-09-20 RX ADMIN — TEMAZEPAM SCH MG: 15 CAPSULE ORAL at 21:36

## 2018-09-20 NOTE — PDOC PROGRESS REPORT
Subjective


Progress Note for:: 09/20/18


Subjective:: 





MELISSA ANDERSEN is a 73 year old female who relates that on 9/17/18 at 5 in the 

morning she was with her cat in her bed, she tried to lift the cat to move her 

off the bed and the cat bit her on the proximal dorsum of her left hand.  On 9/ 18/18 she noted some swelling in the morning but by evening it was worse, very 

swollen, with a large area of erythema on the dorsum of her wrist spreading to 

her hand and forearm and also streaking up the inside of the left arm from the 

wrist to the axilla. She has an area of excoriation with a clear moisture like 

secretion,  warmth and tenderness to palpation.  She rates her pain as severe 8-

10 out of 10 worsened by palpation or movement of the left hand and radiating 

up into the forearm and elbow area from the wrist pain.  She denies having 

prior similar episodes and she has not identified any ameliorating factors or 

associated symptoms for her pain and swelling. She denies fever, chills, nausea 

and vomiting.  She is up-to-date on her vaccinations and she was started on IV 

Unasyn in the emergency room.





09/20/2018:


Beth states that she thinks she is doing considerably better as her hand is 

much less painful and much less swollen.  She can move her fingers without 

serious discomfort and the redness had been going up her arm is significantly 

diminished.  She continues to be free of nausea, vomiting, fever, chills or 

other associated symptoms.  She announces that she would like to be discharged 

tomorrow if possible as she has things that she needs to take care of at home.


Reason For Visit: 


CAT BITE/HAND CELLULITIS








Physical Exam


Vital Signs: 


 











Temp Pulse Resp BP Pulse Ox


 


 98.1 F   62   18   134/49 H  96 


 


 09/20/18 11:45  09/20/18 11:45  09/20/18 11:45  09/20/18 11:45  09/20/18 11:45








 Intake & Output











 09/19/18 09/20/18 09/21/18





 06:59 06:59 06:59


 


Intake Total  875 100


 


Balance  875 100


 


Weight  73.1 kg 











General appearance: PRESENT: no acute distress, cooperative


Head exam: PRESENT: atraumatic, normocephalic


Eye exam: PRESENT: conjunctiva pink.  ABSENT: conjunctival injection


Ear exam: PRESENT: normal external ear exam.  ABSENT: bleeding, drainage


Mouth exam: PRESENT: moist, tongue midline


Neck exam: ABSENT: thyromegaly, tracheal deviation


Respiratory exam: PRESENT: clear to auscultation sancho, symmetrical, unlabored


Cardiovascular exam: PRESENT: RRR.  ABSENT: clicks, diastolic murmur, gallop, 

rubs, systolic murmur


Pulses: PRESENT: normal radial pulses


Vascular exam: PRESENT: normal capillary refill.  ABSENT: pallor


GI/Abdominal exam: PRESENT: normal bowel sounds, soft


Rectal exam: PRESENT: deferred


Extremities exam: PRESENT: tenderness - Of the left hand dorsal surface as well 

as the dorsal surface of the left distal forearm and wrist.  The tenderness is 

significantly decreased from that which was noted on exam yesterday., +1 edema 

- Of the left hand wrist and distal forearm.  This is significantly decreased 

from the edema that was noted on yesterday's exam.


Musculoskeletal exam: PRESENT: ambulatory.  ABSENT: deformity, dislocation


Neurological exam: PRESENT: alert, oriented to person, oriented to place, 

oriented to time, oriented to situation, CN II-XII grossly intact.  ABSENT: 

motor sensory deficit


Psychiatric exam: PRESENT: appropriate affect, normal mood


Skin exam: PRESENT: erythema, warm, other - Her left hand, wrist and distal 

forearm erythema edema increased warmth and tenderness is overall significantly 

reduced from her previous findings..  ABSENT: jaundice, rash, urticaria





Results


Laboratory Results: 


 





 09/19/18 05:59 





 09/19/18 05:59 











Assessment & Plan





- Diagnosis


(1) Cat bite of hand


Qualifiers: 


   Encounter type: initial encounter   Laterality: left   Qualified Code(s): 

S61.452A - Open bite of left hand, initial encounter; W55.01XA - Bitten by cat, 

initial encounter; W55.01XA - Bitten by cat, initial encounter   


Is this a current diagnosis for this admission?: Yes   


Plan: 


Ms. Andersen has already received her tetanus vaccination and is currently up-to-

date on all of her other vaccinations.  Her cat has been vaccinated for rabies 

and that vaccination is current.  Will be kept and observed for any signs of 

illness.








(2) Cellulitis of left arm


Is this a current diagnosis for this admission?: Yes   


Plan: 


Patient has been started on IV Unasyn which will be continued until her she 

shows an adequate response and she will be converted to oral Augmentin and 

should be able to be discharged home.  Surgical consultation will be obtained 

if a definitive abscess or area of necrosis develops.








(3) COPD (chronic obstructive pulmonary disease)


Is this a current diagnosis for this admission?: Yes   


Plan: 


Patient generally uses an albuterol inhaler at home.  Arrange for her to use a 

as needed levalbuterol nebulizer treatment every 4 hours as needed dyspnea or 

wheezing.








(4) Hypertension


Qualifiers: 


   Hypertension type: essential hypertension   Qualified Code(s): I10 - 

Essential (primary) hypertension   


Is this a current diagnosis for this admission?: Yes   


Plan: 


Continue current home medications as hypertension is well controlled.








(5) Hypothyroidism


Qualifiers: 


   Hypothyroidism type: unspecified   Qualified Code(s): E03.9 - Hypothyroidism

, unspecified   


Is this a current diagnosis for this admission?: Yes   


Plan: 


Continue current medications as hypothyroidism is well controlled.  TSH will be 

checked in the morning.








- Time


Time Spent with patient: 25-34 minutes


Anticipated discharge: Home


Within: within 36 hours

## 2018-09-21 VITALS — SYSTOLIC BLOOD PRESSURE: 138 MMHG | DIASTOLIC BLOOD PRESSURE: 58 MMHG

## 2018-09-21 LAB
ADD MANUAL DIFF: NO
BASOPHILS # BLD AUTO: 0.1 10^3/UL (ref 0–0.2)
BASOPHILS NFR BLD AUTO: 1 % (ref 0–2)
EOSINOPHIL # BLD AUTO: 0.4 10^3/UL (ref 0–0.6)
EOSINOPHIL NFR BLD AUTO: 7.3 % (ref 0–6)
ERYTHROCYTE [DISTWIDTH] IN BLOOD BY AUTOMATED COUNT: 15.9 % (ref 11.5–14)
HCT VFR BLD CALC: 29.2 % (ref 36–47)
HGB BLD-MCNC: 9.1 G/DL (ref 12–15.5)
LYMPHOCYTES # BLD AUTO: 1.2 10^3/UL (ref 0.5–4.7)
LYMPHOCYTES NFR BLD AUTO: 21.7 % (ref 13–45)
MCH RBC QN AUTO: 22.8 PG (ref 27–33.4)
MCHC RBC AUTO-ENTMCNC: 31.3 G/DL (ref 32–36)
MCV RBC AUTO: 73 FL (ref 80–97)
MONOCYTES # BLD AUTO: 0.7 10^3/UL (ref 0.1–1.4)
MONOCYTES NFR BLD AUTO: 13.9 % (ref 3–13)
NEUTROPHILS # BLD AUTO: 3 10^3/UL (ref 1.7–8.2)
NEUTS SEG NFR BLD AUTO: 56.1 % (ref 42–78)
PLATELET # BLD: 197 10^3/UL (ref 150–450)
RBC # BLD AUTO: 4 10^6/UL (ref 3.72–5.28)
TOTAL CELLS COUNTED % (AUTO): 100 %
WBC # BLD AUTO: 5.3 10^3/UL (ref 4–10.5)

## 2018-09-21 PROCEDURE — 3E0F73Z INTRODUCTION OF ANTI-INFLAMMATORY INTO RESPIRATORY TRACT, VIA NATURAL OR ARTIFICIAL OPENING: ICD-10-PCS | Performed by: FAMILY MEDICINE

## 2018-09-21 RX ADMIN — ENOXAPARIN SODIUM SCH MG: 40 INJECTION SUBCUTANEOUS at 10:34

## 2018-09-21 RX ADMIN — CARVEDILOL SCH MG: 12.5 TABLET, FILM COATED ORAL at 10:48

## 2018-09-21 RX ADMIN — LANSOPRAZOLE SCH MG: 30 TABLET, ORALLY DISINTEGRATING, DELAYED RELEASE ORAL at 05:26

## 2018-09-21 RX ADMIN — SIMVASTATIN SCH MG: 40 TABLET, FILM COATED ORAL at 10:47

## 2018-09-21 RX ADMIN — OXYCODONE AND ACETAMINOPHEN PRN TAB: 5; 325 TABLET ORAL at 08:10

## 2018-09-21 RX ADMIN — ASPIRIN SCH MG: 81 TABLET, COATED ORAL at 10:46

## 2018-09-21 RX ADMIN — AMOXICILLIN AND CLAVULANATE POTASSIUM SCH TAB: 500; 125 TABLET, FILM COATED ORAL at 10:50

## 2018-09-21 RX ADMIN — Medication SCH ML: at 05:25

## 2018-09-21 RX ADMIN — POLYETHYLENE GLYCOL 3350 SCH GM: 17 POWDER, FOR SOLUTION ORAL at 10:37

## 2018-09-21 RX ADMIN — CHLORTHALIDONE SCH MG: 25 TABLET ORAL at 10:47

## 2018-09-21 RX ADMIN — OXYCODONE AND ACETAMINOPHEN PRN TAB: 5; 325 TABLET ORAL at 00:28

## 2018-09-21 RX ADMIN — OXYCODONE AND ACETAMINOPHEN PRN TAB: 5; 325 TABLET ORAL at 13:50

## 2018-09-21 RX ADMIN — AMPICILLIN SODIUM AND SULBACTAM SODIUM SCH MLS/HR: 2; 1 INJECTION, POWDER, FOR SOLUTION INTRAMUSCULAR; INTRAVENOUS at 05:25

## 2018-09-21 RX ADMIN — PREGABALIN SCH MG: 75 CAPSULE ORAL at 10:37

## 2018-09-21 RX ADMIN — LEVOTHYROXINE SODIUM SCH MG: 25 TABLET ORAL at 05:26

## 2018-09-21 RX ADMIN — AMPICILLIN SODIUM AND SULBACTAM SODIUM SCH MLS/HR: 2; 1 INJECTION, POWDER, FOR SOLUTION INTRAMUSCULAR; INTRAVENOUS at 00:28

## 2018-09-21 RX ADMIN — METOCLOPRAMIDE SCH MG: 5 INJECTION, SOLUTION INTRAMUSCULAR; INTRAVENOUS at 08:09

## 2018-09-21 RX ADMIN — AMOXICILLIN AND CLAVULANATE POTASSIUM SCH TAB: 500; 125 TABLET, FILM COATED ORAL at 13:52

## 2018-09-21 RX ADMIN — FENOFIBRATE SCH MG: 145 TABLET ORAL at 10:44

## 2018-09-21 RX ADMIN — OXYCODONE HYDROCHLORIDE SCH MG: 10 TABLET, FILM COATED, EXTENDED RELEASE ORAL at 10:37

## 2018-09-21 NOTE — PDOC DISCHARGE SUMMARY
General





- Admit/Disc Date/PCP


Admission Date/Primary Care Provider: 


  09/18/18 20:17





  OLIVA HESS MD





Discharge Date: 09/21/18





- Discharge Diagnosis


(1) Cat bite of hand


Is this a current diagnosis for this admission?: Yes   


Summary: 


Melissa was admitted with severe erythema, edema and pain in the dorsum of her 

left hand extending to her wrist and distal left forearm.  She sustained a cat 

bite which had gotten significantly infected over 2 days.  She was started on 

IV Unasyn and showed an excellent response over the next 36-48 hours.  She was 

converted to oral antibiotic therapy utilizing Augmentin and she will be 

discharged home in improved and stable condition because of her excellent 

continued response.








(2) Cellulitis of left arm


Is this a current diagnosis for this admission?: Yes   


Summary: 


She was started on IV Unasyn and showed an excellent response over the next 36-

48 hours.  She was converted to oral antibiotic therapy utilizing Augmentin and 

she will be discharged home in improved and stable condition because of her 

excellent continued response.








(3) COPD (chronic obstructive pulmonary disease)


Is this a current diagnosis for this admission?: Yes   


Summary: 


There was continued on a as needed treatment regimen for any wheezing or 

difficulty breathing she might have encountered.  She did not require any 

significant respiratory therapy during her hospital course.








(4) Hypertension


Is this a current diagnosis for this admission?: Yes   


Summary: 


Les was maintained on her usual home antihypertensive regimen and has done 

well throughout her hospital course with her blood pressure remaining stable 

and well controlled.








(5) Hypothyroidism


Is this a current diagnosis for this admission?: Yes   


Summary: 


Les was maintained on her usual thyroid replacement therapy during her 

hospital course.








- Additional Information


Resuscitation Status: Full Code


Discharge Diet: Cardiac


Discharge Activity: Activity As Tolerated


Prescriptions: 


Amoxicillin/Potassium Clav [Amox-Clav 875-125 mg Tablet] 1 each PO BIDBS 7 Days 

#14 tablet


Ferrous Sulfate [Feosol 325 mg Tablet] 325 mg PO DAILY 100 Days #100 tablet


Home Medications: 








Aspirin [Ecotrin] 81 mg PO DAILY 09/18/18 


Benazepril HCl [Lotensin 5 mg Tablet] 10 mg PO QHS 09/18/18 


Carvedilol [Coreg 12.5 mg Tablet] 12.5 mg PO Q12 09/18/18 


Cetirizine HCl [Zyrtec 10 mg Tablet] 10 mg PO QHS 09/18/18 


Chlorthalidone [Chlorthalidone 25 mg Tablet] 25 mg PO DAILY 09/18/18 


Dexlansoprazole [Dexilant 60 mg Capsule] 60 mg PO DAILY 09/18/18 


Estradiol [Estrace] 1 mg PO QHS 09/18/18 


Fenofibrate Nanocrystallized [Tricor 145 mg Tablet] 145 mg PO DAILY 09/18/18 


Levothyroxine Sodium [Synthroid] 25 mcg PO QAM 09/18/18 


Oxycodone HCl [Oxycontin] 20 mg PO Q12 09/18/18 


Oxycodone HCl/Acetaminophen [Percocet 7.5-325 mg Tablet] 1 each PO Q6HP PRN 09/ 18/18 


Polyethylene Glycol 3350 [Miralax Powder 17 gm/Packet] 1 packet PO DAILY 09/18/ 18 


Potassium Chloride [Klor-Con M10] 20 meq PO QHS 09/18/18 


Pregabalin [Lyrica 75 mg Capsule] 75 mg PO DAILY 09/18/18 


Simvastatin [Zocor 40 mg Tablet] 40 mg PO DAILY 09/18/18 


Temazepam [Restoril 15 mg Capsule] 15 mg PO QHS 09/18/18 


Amoxicillin/Potassium Clav [Amox-Clav 875-125 mg Tablet] 1 each PO BIDBS 7 Days 

#14 tablet 09/21/18 


Ferrous Sulfate [Feosol 325 mg Tablet] 325 mg PO DAILY 100 Days #100 tablet 09/ 21/18 











History of Present Illness


History of Present Illness: 


MELISSA BATISTA is a 73 year old female








Hospital Course


Hospital Course: 





MELISSA BATISTA is a 73 year old female who relates that on 9/17/18 at 5 in the 

morning she was with her cat in her bed, she tried to lift the cat to move her 

off the bed and the cat bit her on the proximal dorsum of her left hand.  On 9/ 18/18 she noted some swelling in the morning but by evening it was worse, very 

swollen, with a large area of erythema on the dorsum of her wrist spreading to 

her hand and forearm and also streaking up the inside of the left arm from the 

wrist to the axilla. She has an area of excoriation with a clear moisture like 

secretion,  warmth and tenderness to palpation.  She rates her pain as severe 8-

10 out of 10 worsened by palpation or movement of the left hand and radiating 

up into the forearm and elbow area from the wrist pain.  She denies having 

prior similar episodes and she has not identified any ameliorating factors or 

associated symptoms for her pain and swelling. She denies fever, chills, nausea 

and vomiting.  She is up-to-date on her vaccinations and she was started on IV 

Unasyn in the emergency room.





09/20/2018:


Beth states that she thinks she is doing considerably better as her hand is 

much less painful and much less swollen.  She can move her fingers without 

serious discomfort and the redness had been going up her arm is significantly 

diminished.  She continues to be free of nausea, vomiting, fever, chills or 

other associated symptoms.  She announces that she would like to be discharged 

tomorrow if possible as she has things that she needs to take care of at home.





09/21/2018:


Melissa's erythema, edema and pain in her left upper extremity is been 

dramatically reduced over the last 24 hours.  She was started on oral 

antibiotics this morning and has noted continued improvement since that time.  

She is looking forward to going home and would like to be discharged as soon as 

possible.  He will be discharged home in improved and stable condition.





Physical Exam


Vital Signs: 


 











Temp Pulse Resp BP Pulse Ox


 


 98.1 F   60   18   124/57 L  96 


 


 09/21/18 15:12  09/21/18 15:12  09/21/18 15:12  09/21/18 15:12  09/21/18 15:12








 Intake & Output











 09/20/18 09/21/18 09/22/18





 06:59 06:59 06:59


 


Intake Total 875 1580 360


 


Balance 875 1580 360


 


Weight 73.1 kg 73.3 kg 











General appearance: PRESENT: no acute distress, cooperative


Head exam: PRESENT: atraumatic, normocephalic


Eye exam: ABSENT: periorbital swelling, scleral icterus


Ear exam: PRESENT: normal external ear exam.  ABSENT: drainage


Mouth exam: PRESENT: neck supple, tongue midline


Neck exam: ABSENT: thyromegaly, tracheal deviation


Respiratory exam: PRESENT: clear to auscultation sancho, symmetrical, unlabored


Cardiovascular exam: PRESENT: RRR.  ABSENT: clicks, diastolic murmur, gallop, 

rubs, systolic murmur


Pulses: PRESENT: normal radial pulses


Vascular exam: PRESENT: normal capillary refill.  ABSENT: pallor


GI/Abdominal exam: PRESENT: normal bowel sounds, soft


Extremities exam: PRESENT: other - Mild erythema and edema with minimal 

tenderness on palpation present in the left hand over the dorsum with minimal 

extension into the dorsum of the left wrist and the dorsum of the distal 

forearm.  Lymphangitis that had previously been noted is resolved..  ABSENT: 

joint swelling, pedal edema


Musculoskeletal exam: PRESENT: full ROM.  ABSENT: deformity, dislocation


Neurological exam: PRESENT: alert, oriented to person, oriented to place, 

oriented to time, oriented to situation, CN II-XII grossly intact


Psychiatric exam: PRESENT: appropriate affect, normal mood


Skin exam: PRESENT: other - Resolving cellulitis changes as reported above..  

ABSENT: jaundice, rash, urticaria





Results


Laboratory Results: 


 





 09/21/18 08:26 





 09/19/18 05:59 





 











  09/19/18 09/21/18





  05:59 08:26


 


WBC   5.3


 


RBC   4.00


 


Hgb   9.1 L


 


Hct   29.2 L


 


MCV   73 L


 


MCH   22.8 L


 


MCHC   31.3 L


 


RDW   15.9 H


 


Plt Count   197


 


Seg Neutrophils %   56.1


 


Lymphocytes %   21.7


 


Monocytes %   13.9 H


 


Eosinophils %   7.3 H


 


Basophils %   1.0


 


Absolute Neutrophils   3.0


 


Absolute Lymphocytes   1.2


 


Absolute Monocytes   0.7


 


Absolute Eosinophils   0.4


 


Absolute Basophils   0.1


 


TSH  1.17 














Qualifiers





- *


PATIENT BEING DISCHARGED WITH ANY OF THE FOLLOWING DIAGNOSIS: No





Plan


Discharge Plan: 





Discharged home in improved and stable condition


Time Spent: Greater than 30 Minutes

## 2020-05-18 ENCOUNTER — HOSPITAL ENCOUNTER (INPATIENT)
Dept: HOSPITAL 62 - ER | Age: 75
LOS: 9 days | Discharge: ANOTHER HOSPITAL | DRG: 640 | End: 2020-05-27
Attending: FAMILY MEDICINE | Admitting: INTERNAL MEDICINE
Payer: MEDICARE

## 2020-05-18 DIAGNOSIS — K21.9: ICD-10-CM

## 2020-05-18 DIAGNOSIS — Z79.51: ICD-10-CM

## 2020-05-18 DIAGNOSIS — E87.1: Primary | ICD-10-CM

## 2020-05-18 DIAGNOSIS — Z88.1: ICD-10-CM

## 2020-05-18 DIAGNOSIS — Z80.1: ICD-10-CM

## 2020-05-18 DIAGNOSIS — E87.6: ICD-10-CM

## 2020-05-18 DIAGNOSIS — E03.9: ICD-10-CM

## 2020-05-18 DIAGNOSIS — Z79.82: ICD-10-CM

## 2020-05-18 DIAGNOSIS — Z87.891: ICD-10-CM

## 2020-05-18 DIAGNOSIS — Z95.810: ICD-10-CM

## 2020-05-18 DIAGNOSIS — E78.1: ICD-10-CM

## 2020-05-18 DIAGNOSIS — R59.0: ICD-10-CM

## 2020-05-18 DIAGNOSIS — N28.1: ICD-10-CM

## 2020-05-18 DIAGNOSIS — I50.42: ICD-10-CM

## 2020-05-18 DIAGNOSIS — I50.9: ICD-10-CM

## 2020-05-18 DIAGNOSIS — R91.1: ICD-10-CM

## 2020-05-18 DIAGNOSIS — J18.9: ICD-10-CM

## 2020-05-18 DIAGNOSIS — M19.90: ICD-10-CM

## 2020-05-18 DIAGNOSIS — I11.0: ICD-10-CM

## 2020-05-18 DIAGNOSIS — Z79.899: ICD-10-CM

## 2020-05-18 DIAGNOSIS — Z20.828: ICD-10-CM

## 2020-05-18 DIAGNOSIS — E83.42: ICD-10-CM

## 2020-05-18 DIAGNOSIS — D69.6: ICD-10-CM

## 2020-05-18 DIAGNOSIS — Z79.890: ICD-10-CM

## 2020-05-18 DIAGNOSIS — J44.9: ICD-10-CM

## 2020-05-18 DIAGNOSIS — Z88.8: ICD-10-CM

## 2020-05-18 DIAGNOSIS — R21: ICD-10-CM

## 2020-05-18 LAB
ADD MANUAL DIFF: NO
ALBUMIN SERPL-MCNC: 5 G/DL (ref 3.5–5)
ALP SERPL-CCNC: 47 U/L (ref 38–126)
ANION GAP SERPL CALC-SCNC: 10 MMOL/L (ref 5–19)
ANION GAP SERPL CALC-SCNC: 11 MMOL/L (ref 5–19)
ANION GAP SERPL CALC-SCNC: 12 MMOL/L (ref 5–19)
APPEARANCE UR: (no result)
APTT PPP: YELLOW S
AST SERPL-CCNC: 63 U/L (ref 14–36)
BASOPHILS # BLD AUTO: 0.1 10^3/UL (ref 0–0.2)
BASOPHILS NFR BLD AUTO: 1.1 % (ref 0–2)
BILIRUB DIRECT SERPL-MCNC: 0.2 MG/DL (ref 0–0.4)
BILIRUB SERPL-MCNC: 1.1 MG/DL (ref 0.2–1.3)
BILIRUB UR QL STRIP: NEGATIVE
BUN SERPL-MCNC: 14 MG/DL (ref 7–20)
BUN SERPL-MCNC: 16 MG/DL (ref 7–20)
BUN SERPL-MCNC: 17 MG/DL (ref 7–20)
CALCIUM: 10.6 MG/DL (ref 8.4–10.2)
CALCIUM: 9.5 MG/DL (ref 8.4–10.2)
CALCIUM: 9.6 MG/DL (ref 8.4–10.2)
CHLORIDE SERPL-SCNC: 69 MMOL/L (ref 98–107)
CHLORIDE SERPL-SCNC: 72 MMOL/L (ref 98–107)
CHLORIDE SERPL-SCNC: 73 MMOL/L (ref 98–107)
CO2 SERPL-SCNC: 27 MMOL/L (ref 22–30)
CO2 SERPL-SCNC: 30 MMOL/L (ref 22–30)
CO2 SERPL-SCNC: 30 MMOL/L (ref 22–30)
EOSINOPHIL # BLD AUTO: 0.3 10^3/UL (ref 0–0.6)
EOSINOPHIL NFR BLD AUTO: 5.9 % (ref 0–6)
FREE T4 (FREE THYROXINE): 1.6 NG/DL (ref 0.78–2.19)
GLUCOSE SERPL-MCNC: 101 MG/DL (ref 75–110)
GLUCOSE SERPL-MCNC: 85 MG/DL (ref 75–110)
GLUCOSE SERPL-MCNC: 86 MG/DL (ref 75–110)
GLUCOSE UR STRIP-MCNC: NEGATIVE MG/DL
HCT VFR BLD CALC: 42 % (ref 36–47)
HGB BLD-MCNC: 13.8 G/DL (ref 12–15.5)
KETONES UR STRIP-MCNC: (no result) MG/DL
LYMPHOCYTES # BLD AUTO: 0.7 10^3/UL (ref 0.5–4.7)
LYMPHOCYTES NFR BLD AUTO: 13.7 % (ref 13–45)
MONOCYTES # BLD AUTO: 0.7 10^3/UL (ref 0.1–1.4)
MONOCYTES NFR BLD AUTO: 13.4 % (ref 3–13)
NEUTROPHILS # BLD AUTO: 3.2 10^3/UL (ref 1.7–8.2)
NEUTS SEG NFR BLD AUTO: 65.9 % (ref 42–78)
NITRITE UR QL STRIP: NEGATIVE
PH UR STRIP: 5 [PH] (ref 5–9)
PHOSPHATE SERPL-MCNC: 3.3 MG/DL (ref 2.5–4.5)
PLATELET # BLD: 175 10^3/UL (ref 150–450)
POTASSIUM SERPL-SCNC: 2.9 MMOL/L (ref 3.6–5)
POTASSIUM SERPL-SCNC: 3.2 MMOL/L (ref 3.6–5)
POTASSIUM SERPL-SCNC: 3.6 MMOL/L (ref 3.6–5)
PROT SERPL-MCNC: 7.5 G/DL (ref 6.3–8.2)
PROT UR STRIP-MCNC: 30 MG/DL
SP GR UR STRIP: 1.02
T3FREE SERPL-MCNC: 3.54 PG/ML (ref 2.77–5.27)
TOTAL CELLS COUNTED % (AUTO): 100 %
UROBILINOGEN UR-MCNC: 2 MG/DL (ref ?–2)
WBC # BLD AUTO: 4.9 10^3/UL (ref 4–10.5)

## 2020-05-18 PROCEDURE — 71260 CT THORAX DX C+: CPT

## 2020-05-18 PROCEDURE — 86146 BETA-2 GLYCOPROTEIN ANTIBODY: CPT

## 2020-05-18 PROCEDURE — 86160 COMPLEMENT ANTIGEN: CPT

## 2020-05-18 PROCEDURE — 84145 PROCALCITONIN (PCT): CPT

## 2020-05-18 PROCEDURE — 87635 SARS-COV-2 COVID-19 AMP PRB: CPT

## 2020-05-18 PROCEDURE — 85598 HEXAGNAL PHOSPH PLTLT NEUTRL: CPT

## 2020-05-18 PROCEDURE — 80076 HEPATIC FUNCTION PANEL: CPT

## 2020-05-18 PROCEDURE — 83880 ASSAY OF NATRIURETIC PEPTIDE: CPT

## 2020-05-18 PROCEDURE — 84481 FREE ASSAY (FT-3): CPT

## 2020-05-18 PROCEDURE — 85730 THROMBOPLASTIN TIME PARTIAL: CPT

## 2020-05-18 PROCEDURE — 85045 AUTOMATED RETICULOCYTE COUNT: CPT

## 2020-05-18 PROCEDURE — 85597 PHOSPHOLIPID PLTLT NEUTRALIZ: CPT

## 2020-05-18 PROCEDURE — 86235 NUCLEAR ANTIGEN ANTIBODY: CPT

## 2020-05-18 PROCEDURE — 84439 ASSAY OF FREE THYROXINE: CPT

## 2020-05-18 PROCEDURE — 84484 ASSAY OF TROPONIN QUANT: CPT

## 2020-05-18 PROCEDURE — 93005 ELECTROCARDIOGRAM TRACING: CPT

## 2020-05-18 PROCEDURE — 86225 DNA ANTIBODY NATIVE: CPT

## 2020-05-18 PROCEDURE — 36415 COLL VENOUS BLD VENIPUNCTURE: CPT

## 2020-05-18 PROCEDURE — 85732 THROMBOPLASTIN TIME PARTIAL: CPT

## 2020-05-18 PROCEDURE — 80053 COMPREHEN METABOLIC PANEL: CPT

## 2020-05-18 PROCEDURE — C9113 INJ PANTOPRAZOLE SODIUM, VIA: HCPCS

## 2020-05-18 PROCEDURE — 86747 PARVOVIRUS ANTIBODY: CPT

## 2020-05-18 PROCEDURE — 71045 X-RAY EXAM CHEST 1 VIEW: CPT

## 2020-05-18 PROCEDURE — 99291 CRITICAL CARE FIRST HOUR: CPT

## 2020-05-18 PROCEDURE — 84443 ASSAY THYROID STIM HORMONE: CPT

## 2020-05-18 PROCEDURE — 99292 CRITICAL CARE ADDL 30 MIN: CPT

## 2020-05-18 PROCEDURE — 82533 TOTAL CORTISOL: CPT

## 2020-05-18 PROCEDURE — 83935 ASSAY OF URINE OSMOLALITY: CPT

## 2020-05-18 PROCEDURE — 86162 COMPLEMENT TOTAL (CH50): CPT

## 2020-05-18 PROCEDURE — 82550 ASSAY OF CK (CPK): CPT

## 2020-05-18 PROCEDURE — S0028 INJECTION, FAMOTIDINE, 20 MG: HCPCS

## 2020-05-18 PROCEDURE — 82330 ASSAY OF CALCIUM: CPT

## 2020-05-18 PROCEDURE — 86140 C-REACTIVE PROTEIN: CPT

## 2020-05-18 PROCEDURE — 86038 ANTINUCLEAR ANTIBODIES: CPT

## 2020-05-18 PROCEDURE — 80048 BASIC METABOLIC PNL TOTAL CA: CPT

## 2020-05-18 PROCEDURE — 85025 COMPLETE CBC W/AUTO DIFF WBC: CPT

## 2020-05-18 PROCEDURE — 84478 ASSAY OF TRIGLYCERIDES: CPT

## 2020-05-18 PROCEDURE — 86148 ANTI-PHOSPHOLIPID ANTIBODY: CPT

## 2020-05-18 PROCEDURE — 84100 ASSAY OF PHOSPHORUS: CPT

## 2020-05-18 PROCEDURE — 86147 CARDIOLIPIN ANTIBODY EA IG: CPT

## 2020-05-18 PROCEDURE — 83690 ASSAY OF LIPASE: CPT

## 2020-05-18 PROCEDURE — 93010 ELECTROCARDIOGRAM REPORT: CPT

## 2020-05-18 PROCEDURE — 85652 RBC SED RATE AUTOMATED: CPT

## 2020-05-18 PROCEDURE — 83735 ASSAY OF MAGNESIUM: CPT

## 2020-05-18 PROCEDURE — 96361 HYDRATE IV INFUSION ADD-ON: CPT

## 2020-05-18 PROCEDURE — 86849 IMMUNOLOGY PROCEDURE: CPT

## 2020-05-18 PROCEDURE — 74022 RADEX COMPL AQT ABD SERIES: CPT

## 2020-05-18 PROCEDURE — 96360 HYDRATION IV INFUSION INIT: CPT

## 2020-05-18 PROCEDURE — 85613 RUSSELL VIPER VENOM DILUTED: CPT

## 2020-05-18 PROCEDURE — 81001 URINALYSIS AUTO W/SCOPE: CPT

## 2020-05-18 PROCEDURE — 74177 CT ABD & PELVIS W/CONTRAST: CPT

## 2020-05-18 PROCEDURE — 87040 BLOOD CULTURE FOR BACTERIA: CPT

## 2020-05-18 PROCEDURE — 82085 ASSAY OF ALDOLASE: CPT

## 2020-05-18 PROCEDURE — 82310 ASSAY OF CALCIUM: CPT

## 2020-05-18 PROCEDURE — 83930 ASSAY OF BLOOD OSMOLALITY: CPT

## 2020-05-18 RX ADMIN — TEMAZEPAM SCH MG: 15 CAPSULE ORAL at 22:27

## 2020-05-18 RX ADMIN — MAGNESIUM SULFATE IN DEXTROSE SCH MLS/HR: 10 INJECTION, SOLUTION INTRAVENOUS at 23:11

## 2020-05-18 RX ADMIN — MAGNESIUM SULFATE IN DEXTROSE SCH MLS/HR: 10 INJECTION, SOLUTION INTRAVENOUS at 22:13

## 2020-05-18 RX ADMIN — OXYCODONE HYDROCHLORIDE PRN MG: 5 TABLET ORAL at 22:11

## 2020-05-18 RX ADMIN — CARVEDILOL SCH MG: 12.5 TABLET, FILM COATED ORAL at 22:27

## 2020-05-18 RX ADMIN — SODIUM CHLORIDE PRN MLS/HR: 3 INJECTION, SOLUTION INTRAVENOUS at 20:20

## 2020-05-18 RX ADMIN — HEPARIN SODIUM SCH UNIT: 5000 INJECTION, SOLUTION INTRAVENOUS; SUBCUTANEOUS at 22:28

## 2020-05-18 RX ADMIN — DEXAMETHASONE SODIUM PHOSPHATE PRN MG: 10 INJECTION INTRAMUSCULAR; INTRAVENOUS at 22:11

## 2020-05-18 RX ADMIN — BENAZEPRIL HYDROCHLORIDE SCH MG: 5 TABLET ORAL at 22:49

## 2020-05-18 RX ADMIN — METHOCARBAMOL SCH MG: 500 TABLET ORAL at 22:27

## 2020-05-18 RX ADMIN — Medication SCH: at 22:28

## 2020-05-18 NOTE — ER DOCUMENT REPORT
ED General





- General


Chief Complaint: Nausea/Vomiting


Stated Complaint: VOMITING


Primary Care Provider: 


OLIVA HESS MD [Primary Care Provider] - Follow up as needed


Mode of Arrival: Ambulatory


Information source: Patient


Notes: 





Patient is a 74-year-old female presenting to the emergency department chief 

complaint of overall generalized weakness decreased appetite.  Patient states 

that she has been being treated for pneumonia for approximately 2 weeks she 

recently completed a 10-day course of amoxicillin and subsequently on follow-up 

was found to have persistent pneumonia and started on another 10-day course of 

amoxicillin 2 days ago.  Patient states that she is also been vomiting for 2 

weeks intermittently.  Patient denies travel history trauma history bad food 

exposure no obvious sick contacts.  Patient states she also is intermittently 

lightheaded and dizzy.


TRAVEL OUTSIDE OF THE [place] IN LAST 30 DAYS: No





- HPI


Onset: Other - 2 weeks


Onset/Duration: Gradual, Persistent


Quality of pain: Achy, Fullness


Severity: Moderate


Pain Level: 2


Associated symptoms: Nausea, Vomiting, Weakness


Exacerbated by: Food


Relieved by: Denies


Similar symptoms previously: Yes


Recently seen / treated by doctor: Yes





- Related Data


Allergies/Adverse Reactions: 


                                        





levofloxacin [From Levaquin] Allergy (Severe, Verified 09/18/18 15:04)


   Tachycardia


gabapentin [From Neurontin] Allergy (Intermediate, Verified 09/18/18 15:04)


   CONFUSED/TREMORS


polymyxin B sulfate [From Polysporin] Allergy (Mild, Verified 09/18/18 15:04)


   IRRITATION, HIVES











Past Medical History





- General


Information source: Patient





- Social History


Smoking Status: Never Smoker


Chew tobacco use (# tins/day): No


Frequency of alcohol use: None


Drug Abuse: None


Lives with: Family


Family History: Reviewed & Not Pertinent


Patient has suicidal ideation: No


Patient has homicidal ideation: No





- Past Medical History


Cardiac Medical History: Reports: Hx Atrial Fibrillation, Hx Congestive Heart 

Failure, Hx Hypercholesterolemia, Hx Hypertension - MEDS


   Denies: Hx Heart Attack


Pulmonary Medical History: Reports: Hx COPD


   Denies: Hx Asthma


Neurological Medical History: Denies: Hx Cerebrovascular Accident, Hx Seizures


Endocrine Medical History: Reports: Hx Hypothyroidism


Renal/ Medical History: Denies: Hx Peritoneal Dialysis


GI Medical History: Reports: Hx Gastroesophageal Reflux Disease, Hx Hiatal 

Hernia - YEARS AGO.  Denies: Hx Hepatitis, Hx Ulcer


Musculoskeletal Medical History: Reports Hx Arthritis


Infectious Medical History: Denies: Hx Hepatitis


Past Surgical History: Reports: Hx Cardiac Catheterization, Hx Cardiac Surgery -

ICD PACER/DEFIB [facility] [facility], Hx Hysterectomy, Hx Internal Defibrillator, Hx 

Orthopedic Surgery - BACK, Hx Pacemaker - 2008, Hx Tonsillectomy, Other - Back 

surgery Aortofemoral bypass.  Denies: Hx Mastectomy, Hx Open Heart Surgery





- Immunizations


Hx Diphtheria, Pertussis, Tetanus Vaccination: Yes


Hx Pneumococcal Vaccination: 06/01/18





Review of Systems





- Review of Systems


Notes: 





REVIEW OF SYSTEMS:


CONSTITUTIONAL :  Denies fever,  chills, or sweats.  Denies recent illness.





EENT:   Denies eye, ear, throat, or mouth pain or symptoms.  Denies nasal or 

sinus congestion.





CARDIOVASCULAR:  Denies chest pain.





RESPIRATORY:  Denies cough, cold, or chest congestion.  Denies shortness of 

breath, difficulty breathing, or wheezing.





GASTROINTESTINAL: Per HPI





GENITOURINARY:  Denies difficulty urinating, painful urination, burning, 

frequency, or blood in urine.








MUSCULOSKELETAL:  Denies neck or back pain or joint pain or swelling.





SKIN:   Denies rash or skin lesions.





HEMATOLOGIC :   Denies easy bruising or bleeding.





NEUROLOGICAL: Per HPI





PSYCHIATRIC:  Denies suicidal or homicidal ideations





10 Systems are negative unless otherwise specified above





Physical Exam





- Vital signs


Vitals: 


                                        











Temp


 


 97.7 F 


 


 05/18/20 13:20














- Notes


Notes: 





PHYSICAL EXAMINATION:


 


GENERAL: Well-appearing, well-nourished and in no acute distress.


 


HEAD: Atraumatic, normocephalic.


 


EYES: Pupils equal round and reactive to light, extraocular movements intact, 

sclera anicteric, conjunctiva are normal.


 


ENT: nares patent, oropharynx clear without exudates.  Moist mucous membranes.


 


NECK: Normal range of motion, supple without lymphadenopathy, no appreciable JVD


 


LUNGS: Lungs clear to auscultation bilaterally and equal.  No wheezes rales or 

rhonchi.


 


HEART: Regular rate and rhythm without murmurs


 


ABDOMEN: Soft, nontender, normal bowel sounds.  No guarding, no rebound.  No 

masses appreciated.


 


EXTREMITIES: Active full range of motion, no pitting or edema.  No cyanosis. 2+ 

pulses x4


 


NEUROLOGICAL: No focal neurological deficits. Moves all extremities 

spontaneously and on command.


 


SKIN: Warm, Dry, and intact. Normal turgor, no rashes or lesions noted.





Course





- Re-evaluation


Re-evalutation: 





05/18/20 15:27


Patient has been reevaluated several times and has remained stable at this point

in time.  I have spoken with the intensivist and he has requested 1 L bolus of 

normal saline COVID-19 test and reevaluation of BMP.  At that time we will 

determine whether or not patient needs to go to the ICU.


05/18/20 18:19


Patient has been maintained on a cardiac monitor while in emergency department. 

The patient has remained stable.  Patient has been seen by the intensivist.  

Repeat chemistry after 1 L of IV normal saline demonstrates a slightly decreased

potassium and minimally increased sodium of 111.3.  I spoke once again with the 

intensivist and he is in agreement that the patient needs to be admitted to the 

hospital for severe hyponatremia.  At this time he is requesting not to have 

hypertonic saline started but he will address her sodium level in the ICU.  

Patient is understanding and in agreement with admission.





- Vital Signs


Vital signs: 


                                        











Temp Pulse Resp BP Pulse Ox


 


 97.9 F   60   14   170/61 H  100 


 


 05/18/20 13:23  05/18/20 13:23  05/18/20 13:23  05/18/20 13:23  05/18/20 13:23














- Laboratory


Result Diagrams: 


                                 05/18/20 14:38





                                 05/18/20 17:20


Laboratory results interpreted by me: 


                                        











  05/18/20 05/18/20 05/18/20





  13:09 13:09 13:55


 


Mono % (Auto)   


 


Sodium  111.1 L*  


 


Potassium   


 


Chloride  69 L  


 


Calcium  10.6 H  


 


AST  63 H  


 


Lipase   847.9 H 


 


Urine Protein    30 H


 


Urine Ketones    TRACE H


 


Urine Urobilinogen    2.0 H


 


Urine Ascorbic Acid    40 H














  05/18/20 05/18/20





  14:38 17:20


 


Mono % (Auto)  13.4 H 


 


Sodium   111.3 L*


 


Potassium   3.2 L


 


Chloride   73 L


 


Calcium  


 


AST  


 


Lipase  


 


Urine Protein  


 


Urine Ketones  


 


Urine Urobilinogen  


 


Urine Ascorbic Acid  














- Diagnostic Test


Radiology reviewed: Image reviewed, Reports reviewed





- EKG Interpretation by Me


Rate: Normal


Additional EKG results interpreted by me: 





05/18/20 18:19


EKG shows paced rhythm with atypical left bundle branch rate of 60 bpm left axis

deviation changes are noted compared to prior EKG of January 17, 2018.





Critical Care Note





- Critical Care Note


Total time excluding time spent on procedures (mins): 40


Comments: 





Please allow   40   minutes of critical care time  spent obtaining history from 

patient or surrogate, discussions with consultants, development of treatment 

plan with patient or surrogate, evaluation of patient's response to treatment, 

examination of patient.  This also includes ordering and reviewing laboratory, 

EKG and / or  radiologic studies, performing and reassessing treatments and 

interventions as well as reviewing previous visits and old charts.





This is exclusive of separately billable procedures. 








Discharge





- Discharge


Clinical Impression: 


 Hyponatremia with decreased serum osmolality





Nausea and vomiting


Qualifiers:


 Vomiting type: unspecified Vomiting Intractability: unspecified Qualified 

Code(s): R11.2 - Nausea with vomiting, unspecified





Pancreatitis


Qualifiers:


 Chronicity: acute Pancreatitis type: unspecified pancreatitis type Acute 

pancreatitis complication: unspecified Qualified Code(s): K85.90 - Acute 

pancreatitis without necrosis or infection, unspecified





Condition: Fair


Disposition: ADMITTED AS INPATIENT


Admitting Provider: Fabian (Intensivist)


Unit Admitted: ICU


Referrals: 


OLIVA HESS MD [Primary Care Provider] - Follow up as needed

## 2020-05-18 NOTE — CRITICAL CARE ADMISSION REPORT
HPI


Date:: 05/18/20


Time:: 20:46


Reason for ICU Reason:: Hyponatremia


HPI: 


This 74-year-old female non-smoker presented to [facility] 

emergency department with generalized weakness, associated with decreased 

appetite recurrent emesis and shortness of breath.  The patient reports she has 

been undergoing treatment for "pneumonia" for approximately 2 weeks.  She 

completed a 10-day course of amoxicillin.  However, she was found to have 

"persistent pneumonia" and was started on another 10-day course of amoxicillin 2

days ago.  Patient reports that she has been having emesis off and on for the 

past 2 weeks.  No known sick contacts.





In the emergency department, the patient's serum sodium was found to be 111.  Of

note, she does have a history of congestive heart failure and hypothyroidism.  

She also uses chlorthalidone for hypertension.  Again, she also has recently 

been diagnosed with "pneumonia".





Past Medical History


Cardiac Medical History: Reports: Atrial Fibrillation, Congestive Heart Failure,

Hyperlipidema, Hypertension - MEDS


   Denies: Myocardial Infarction


Pulmonary Medical History: Reports: Chronic Obstructive Pulmonary Disease (COPD)


   Denies: Asthma


Neurological Medical History: 


   Denies: Seizures


Endocrine Medical History: Reports: Hypothyroidism


GI Medical History: Reports: Gastroesophageal Reflux Disease, Hiatal Hernia - 

YEARS AGO


   Denies: Hepatitis


Musculoskeltal Medical History: Reports: Arthritis


Hematology: 


   Denies: Anemia, Sickle Cell Disease





Past Surgical History


Past Surgical History: Reports: Cardiac Catheterization, Hysterectomy, Internal 

Defibrillator, Orthopedic Surgery - BACK, Pacemaker - 2008, Tonsillectomy, Other

- Back surgery Aortofemoral bypass


   Denies: Amputation, Mastectomy





Social/Family History





- Social History


Lives with: Family


Smoking Status: Never Smoker


Frequency of Alcohol Use: None


Hx Recreational Drug Use: No


Hx Prescription Drug Abuse: No





- Medication/Allergies


Home Medications: 








Aspirin [Ecotrin] 81 mg PO DAILY 09/18/18 


Benazepril HCl [Lotensin 5 mg Tablet] 20 mg PO QHS 09/18/18 


Carvedilol [Coreg 12.5 mg Tablet] 12.5 mg PO Q12 09/18/18 


Cetirizine HCl [Zyrtec 10 mg Tablet] 10 mg PO QHS 09/18/18 


Chlorthalidone [Chlorthalidone 25 mg Tablet] 25 mg PO QAM 09/18/18 


Estradiol [Estrace] 1 mg PO QHS 09/18/18 


Fenofibrate Nanocrystallized [Tricor 145 mg Tablet] 145 mg PO DAILY 09/18/18 


Levothyroxine Sodium [Synthroid] 25 mcg PO Q6AM 09/18/18 


Oxycodone HCl [Oxycontin] 20 mg PO Q12 09/18/18 


Oxycodone HCl/Acetaminophen [Percocet 7.5-325 mg Tablet] 1 tab PO Q6HP PRN 

09/18/18 


Polyethylene Glycol 3350 [Miralax Powder 17 gm/Packet] 1 packet PO DAILY 

09/18/18 


Potassium Chloride [Klor-Con M10] 20 meq PO [id number] 09/18/18 


Pregabalin [Lyrica 75 mg Capsule] 75 mg PO Q12 09/18/18 


Simvastatin [Zocor 40 mg Tablet] 40 mg PO QPM 09/18/18 


Temazepam [Restoril 15 mg Capsule] 15 mg PO QHS 09/18/18 


Acyclovir [Zovirax 200 mg Capsule] 200 mg PO Q8 05/18/20 


Albuterol Sulfate [Ventolin Hfa 8 gm Mdi] 1 puff IH Q4HP PRN 05/18/20 


Amoxicillin/Potassium Clav [Amox-Clav 875-125 mg Tablet] 1 each PO Q12 MDD FOR 

10 DAYS 05/18/20 


Lansoprazole 30 mg PO QAM 05/18/20 


Methocarbamol [Robaxin 500 mg Tablet] 500 mg PO QID 05/18/20 


Nitroglycerin [Nitro-Dur 10 mg (0.4MG/Hr) Transdermal Patch] 0.4 mg TD DAILY 

05/18/20 


Ondansetron [Zofran Odt 4 mg Tablet] 4 mg PO Q8HP PRN 05/18/20 








Allergies/Adverse Reactions: 


                                        





levofloxacin [From Levaquin] Allergy (Severe, Verified 09/18/18 15:04)


   Tachycardia


gabapentin [From Neurontin] Allergy (Intermediate, Verified 09/18/18 15:04)


   CONFUSED/TREMORS


polymyxin B sulfate [From Polysporin] Allergy (Mild, Verified 09/18/18 15:04)


   IRRITATION, HIVES











Physical Exam


Vital Signs: 


                                        











Temp Pulse Resp BP Pulse Ox


 


 97.9 F   60   15   143/98 H  100 


 


 05/18/20 13:23  05/18/20 13:23  05/18/20 19:00  05/18/20 19:00  05/18/20 19:00








                                 Intake & Output











 05/17/20 05/18/20 05/19/20





 06:59 06:59 06:59


 


Intake Total   1000


 


Balance   1000


 


Weight   71.668 kg








                                  Weight/Height





Weight                           71.668 kg


Height                           5 ft 7 in








General appearance: PRESENT: no acute distress


Head exam: PRESENT: atraumatic, normocephalic


Eye exam: PRESENT: PERRLA


Mouth exam: PRESENT: moist


Neck exam: PRESENT: full ROM


Respiratory exam: PRESENT: decreased breath sounds


Cardiovascular exam: PRESENT: +S1, +S2, systolic murmur - 2/6 holosystolic 

murmur


Pulses: PRESENT: normal radial pulses, normal dorsalis pedis pul


GI/Abdominal exam: PRESENT: normal bowel sounds, soft


Rectal exam: PRESENT: deferred


Extremities exam: PRESENT: full ROM - No edema


Musculoskeletal exam: PRESENT: ambulatory





Laboratory/Radiographs


Laboratory Results: 


                                        





                                 05/18/20 14:38 





                                        











  05/18/20 05/18/20 05/18/20





  13:09 13:09 13:09


 


WBC  Cancelled  


 


RBC  Cancelled  


 


Hgb  Cancelled  


 


Hct  Cancelled  


 


MCV  Cancelled  


 


MCH  Cancelled  


 


MCHC  Cancelled  


 


RDW  Cancelled  


 


Plt Count  Cancelled  


 


Seg Neutrophils %  Cancelled  


 


Sodium   111.1 L* 


 


Potassium   3.6 


 


Chloride   69 L 


 


Carbon Dioxide   30 


 


Anion Gap   12 


 


BUN   17 


 


Creatinine   0.85 


 


Est GFR ( Amer)   > 60 


 


Glucose   101 


 


Serum Osmolality   


 


Calcium   10.6 H 


 


Total Bilirubin   1.1 


 


AST   63 H 


 


Alkaline Phosphatase   47 


 


Total Protein   7.5 


 


Albumin   5.0 


 


Triglycerides   


 


Lipase    847.9 H


 


TSH   


 


Free T4   


 


Free T3 pg/mL   


 


Urine Color   


 


Urine Appearance   


 


Urine pH   


 


Ur Specific Gravity   


 


Urine Protein   


 


Urine Glucose (UA)   


 


Urine Ketones   


 


Urine Blood   


 


Urine Nitrite   


 


Ur Leukocyte Esterase   


 


Urine WBC (Auto)   


 


Urine RBC (Auto)   


 


Urine Osmolality   














  05/18/20 05/18/20 05/18/20





  13:09 13:55 13:55


 


WBC   


 


RBC   


 


Hgb   


 


Hct   


 


MCV   


 


MCH   


 


MCHC   


 


RDW   


 


Plt Count   


 


Seg Neutrophils %   


 


Sodium   


 


Potassium   


 


Chloride   


 


Carbon Dioxide   


 


Anion Gap   


 


BUN   


 


Creatinine   


 


Est GFR (African Amer)   


 


Glucose   


 


Serum Osmolality  288  


 


Calcium   


 


Total Bilirubin   


 


AST   


 


Alkaline Phosphatase   


 


Total Protein   


 


Albumin   


 


Triglycerides   


 


Lipase   


 


TSH   


 


Free T4   


 


Free T3 pg/mL   


 


Urine Color   YELLOW 


 


Urine Appearance   SLIGHTLY-CLOUDY 


 


Urine pH   5.0 


 


Ur Specific Gravity   1.024 


 


Urine Protein   30 H 


 


Urine Glucose (UA)   NEGATIVE 


 


Urine Ketones   TRACE H 


 


Urine Blood   NEGATIVE 


 


Urine Nitrite   NEGATIVE 


 


Ur Leukocyte Esterase   NEGATIVE 


 


Urine WBC (Auto)   3 


 


Urine RBC (Auto)   2 


 


Urine Osmolality    522














  05/18/20 05/18/20 05/18/20





  14:38 17:20 17:20


 


WBC  4.9  


 


RBC    


 


Hgb  13.8  


 


Hct  42.0  


 


MCV    


 


MCH    


 


MCHC    


 


RDW    


 


Plt Count  175  


 


Seg Neutrophils %  65.9  


 


Sodium   111.3 L* 


 


Potassium   3.2 L 


 


Chloride   73 L 


 


Carbon Dioxide   27 


 


Anion Gap   11 


 


BUN   16 


 


Creatinine   0.76 


 


Est GFR ( Amer)   > 60 


 


Glucose   86 


 


Serum Osmolality   


 


Calcium   9.6 


 


Total Bilirubin   


 


AST   


 


Alkaline Phosphatase   


 


Total Protein   


 


Albumin   


 


Triglycerides   


 


Lipase   


 


TSH   


 


Free T4    1.60


 


Free T3 pg/mL    3.54


 


Urine Color   


 


Urine Appearance   


 


Urine pH   


 


Ur Specific Gravity   


 


Urine Protein   


 


Urine Glucose (UA)   


 


Urine Ketones   


 


Urine Blood   


 


Urine Nitrite   


 


Ur Leukocyte Esterase   


 


Urine WBC (Auto)   


 


Urine RBC (Auto)   


 


Urine Osmolality   














  05/18/20 05/18/20





  17:20 17:20


 


WBC  


 


RBC  


 


Hgb  


 


Hct  


 


MCV  


 


MCH  


 


MCHC  


 


RDW  


 


Plt Count  


 


Seg Neutrophils %  


 


Sodium  


 


Potassium  


 


Chloride  


 


Carbon Dioxide  


 


Anion Gap  


 


BUN  


 


Creatinine  


 


Est GFR (African Amer)  


 


Glucose  


 


Serum Osmolality  


 


Calcium  


 


Total Bilirubin  


 


AST  


 


Alkaline Phosphatase  


 


Total Protein  


 


Albumin  


 


Triglycerides  269 H 


 


Lipase  


 


TSH   1.00


 


Free T4  


 


Free T3 pg/mL  


 


Urine Color  


 


Urine Appearance  


 


Urine pH  


 


Ur Specific Gravity  


 


Urine Protein  


 


Urine Glucose (UA)  


 


Urine Ketones  


 


Urine Blood  


 


Urine Nitrite  


 


Ur Leukocyte Esterase  


 


Urine WBC (Auto)  


 


Urine RBC (Auto)  


 


Urine Osmolality  








                                        











  05/18/20 05/18/20





  14:15 14:15


 


Troponin I   0.049


 


NT-Pro-B Natriuret Pep  247 











Impressions: 


                                        





Acute Abdomen Series  05/18/20 15:06


IMPRESSION:  Constipation.  No obstruction.


 














Critical Time


Critical Time (minutes): 40


-: 


The care of a critically ill patient is dynamic.  This note represents a static 

moment in the admission process. Orders and treatments may be given 

simultaneously and urgently, and time is not representative of the treatment 

process.





This patient requires Critical Care secondary to life threatening organ or limb 

dysfunction.  Without Critical Care services, the patient is at risk for 

increased mortality and morbidity.

## 2020-05-18 NOTE — EKG REPORT
SEVERITY:- ABNORMAL ECG -

A-V DUAL-PACED RHYTHM WITH SOME INHIBITION

IVCD, CONSIDER ATYPICAL LBBB

:

Confirmed by: May Bennett MD 18-May-2020 23:43:02

## 2020-05-18 NOTE — RADIOLOGY REPORT (SQ)
EXAM DESCRIPTION:  ACUTE ABDOMEN SERIES



IMAGES COMPLETED DATE/TIME:  5/18/2020 4:10 pm



REASON FOR STUDY:  n/v



COMPARISON:  Chest x-ray dated 1/17/2018



NUMBER OF VIEWS:  Three views.



TECHNIQUE:  Frontal chest, supine abdomen and upright/decubitus abdomen radiographic images acquired.




LIMITATIONS:  None.



FINDINGS:  CHEST: Lungs clear of infiltrates.  Battery pack and leads are in place.

FREE AIR: None. No abnormal gas collections.

BOWEL GAS PATTERN: Gas pattern is nonobstructive.  There is large amount of stool throughout the colo
n consistent with constipation.

CALCIFICATIONS: No suspicious calcifications.

HARDWARE: None in the abdomen.

SOFT TISSUES: No gross mass or suggestion of organomegaly.

BONES: Postsurgical changes in the lumbar spine.

OTHER: No other significant finding.



IMPRESSION:  Constipation.  No obstruction.



TECHNICAL DOCUMENTATION:  JOB ID:  [id number]

 2011 [employer]- All Rights Reserved



Reading location - IP/workstation name: [email or web address]

## 2020-05-19 LAB
ABSOLUTE RETICS #: 0.09 10^6/UL (ref 0.03–0.12)
ADD MANUAL DIFF: NO
ALBUMIN SERPL-MCNC: 3.4 G/DL (ref 3.5–5)
ALP SERPL-CCNC: 34 U/L (ref 38–126)
ANION GAP SERPL CALC-SCNC: 11 MMOL/L (ref 5–19)
ANION GAP SERPL CALC-SCNC: 7 MMOL/L (ref 5–19)
ANION GAP SERPL CALC-SCNC: 9 MMOL/L (ref 5–19)
AST SERPL-CCNC: 47 U/L (ref 14–36)
BASOPHILS # BLD AUTO: 0 10^3/UL (ref 0–0.2)
BASOPHILS NFR BLD AUTO: 0.6 % (ref 0–2)
BILIRUB DIRECT SERPL-MCNC: 0 MG/DL (ref 0–0.4)
BILIRUB SERPL-MCNC: 0.6 MG/DL (ref 0.2–1.3)
BUN SERPL-MCNC: 10 MG/DL (ref 7–20)
BUN SERPL-MCNC: 10 MG/DL (ref 7–20)
BUN SERPL-MCNC: 12 MG/DL (ref 7–20)
BUN SERPL-MCNC: 8 MG/DL (ref 7–20)
BUN SERPL-MCNC: 8 MG/DL (ref 7–20)
BUN SERPL-MCNC: 9 MG/DL (ref 7–20)
CALCIUM: 8.4 MG/DL (ref 8.4–10.2)
CALCIUM: 8.4 MG/DL (ref 8.4–10.2)
CALCIUM: 8.5 MG/DL (ref 8.4–10.2)
CALCIUM: 8.5 MG/DL (ref 8.4–10.2)
CALCIUM: 8.6 MG/DL (ref 8.4–10.2)
CALCIUM: 8.7 MG/DL (ref 8.4–10.2)
CALCIUM: 8.7 MG/DL (ref 8.4–10.2)
CHLORIDE SERPL-SCNC: 75 MMOL/L (ref 98–107)
CHLORIDE SERPL-SCNC: 79 MMOL/L (ref 98–107)
CHLORIDE SERPL-SCNC: 82 MMOL/L (ref 98–107)
CHLORIDE SERPL-SCNC: 86 MMOL/L (ref 98–107)
CHLORIDE SERPL-SCNC: 87 MMOL/L (ref 98–107)
CHLORIDE SERPL-SCNC: 96 MMOL/L (ref 98–107)
CK SERPL-CCNC: 48 U/L (ref 30–135)
CO2 SERPL-SCNC: 20 MMOL/L (ref 22–30)
CO2 SERPL-SCNC: 23 MMOL/L (ref 22–30)
CO2 SERPL-SCNC: 25 MMOL/L (ref 22–30)
CO2 SERPL-SCNC: 26 MMOL/L (ref 22–30)
CO2 SERPL-SCNC: 27 MMOL/L (ref 22–30)
CO2 SERPL-SCNC: 27 MMOL/L (ref 22–30)
CRP SERPL-MCNC: < 5 MG/L (ref ?–10)
EOSINOPHIL # BLD AUTO: 0.4 10^3/UL (ref 0–0.6)
EOSINOPHIL NFR BLD AUTO: 10.7 % (ref 0–6)
ERYTHROCYTE [DISTWIDTH] IN BLOOD BY AUTOMATED COUNT: 12.9 % (ref 11.5–14)
ERYTHROCYTE [SEDIMENTATION RATE] IN BLOOD: 5 MM/HR (ref 0–30)
GLUCOSE SERPL-MCNC: 75 MG/DL (ref 75–110)
GLUCOSE SERPL-MCNC: 75 MG/DL (ref 75–110)
GLUCOSE SERPL-MCNC: 88 MG/DL (ref 75–110)
GLUCOSE SERPL-MCNC: 88 MG/DL (ref 75–110)
GLUCOSE SERPL-MCNC: 90 MG/DL (ref 75–110)
GLUCOSE SERPL-MCNC: 99 MG/DL (ref 75–110)
HCT VFR BLD CALC: 33.3 % (ref 36–47)
HGB BLD-MCNC: 12.2 G/DL (ref 12–15.5)
LYMPHOCYTES # BLD AUTO: 0.7 10^3/UL (ref 0.5–4.7)
LYMPHOCYTES NFR BLD AUTO: 21.6 % (ref 13–45)
MCH RBC QN AUTO: 32.6 PG (ref 27–33.4)
MCHC RBC AUTO-ENTMCNC: 36.6 G/DL (ref 32–36)
MCV RBC AUTO: 89 FL (ref 80–97)
MONOCYTES # BLD AUTO: 0.6 10^3/UL (ref 0.1–1.4)
MONOCYTES NFR BLD AUTO: 17.8 % (ref 3–13)
NEUTROPHILS # BLD AUTO: 1.7 10^3/UL (ref 1.7–8.2)
NEUTS SEG NFR BLD AUTO: 49.3 % (ref 42–78)
PLATELET # BLD: 131 10^3/UL (ref 150–450)
POTASSIUM SERPL-SCNC: 2.7 MMOL/L (ref 3.6–5)
POTASSIUM SERPL-SCNC: 3.2 MMOL/L (ref 3.6–5)
POTASSIUM SERPL-SCNC: 3.6 MMOL/L (ref 3.6–5)
POTASSIUM SERPL-SCNC: 3.9 MMOL/L (ref 3.6–5)
POTASSIUM SERPL-SCNC: 4.2 MMOL/L (ref 3.6–5)
POTASSIUM SERPL-SCNC: 5.3 MMOL/L (ref 3.6–5)
PROT SERPL-MCNC: 5.5 G/DL (ref 6.3–8.2)
RBC # BLD AUTO: 3.75 10^6/UL (ref 3.72–5.28)
RETICULOCYTE COUNT (AUTO): 2.33 % (ref 0.66–2.85)
TOTAL CELLS COUNTED % (AUTO): 100 %
WBC # BLD AUTO: 3.5 10^3/UL (ref 4–10.5)

## 2020-05-19 RX ADMIN — OXYCODONE HYDROCHLORIDE PRN MG: 5 TABLET ORAL at 09:54

## 2020-05-19 RX ADMIN — CARVEDILOL SCH MG: 12.5 TABLET, FILM COATED ORAL at 21:58

## 2020-05-19 RX ADMIN — Medication SCH: at 22:07

## 2020-05-19 RX ADMIN — DEXAMETHASONE SODIUM PHOSPHATE PRN MG: 10 INJECTION INTRAMUSCULAR; INTRAVENOUS at 15:58

## 2020-05-19 RX ADMIN — FENOFIBRATE SCH MG: 145 TABLET ORAL at 09:53

## 2020-05-19 RX ADMIN — OXYCODONE HYDROCHLORIDE PRN MG: 5 TABLET ORAL at 15:58

## 2020-05-19 RX ADMIN — LEVOTHYROXINE SODIUM SCH MG: 25 TABLET ORAL at 10:19

## 2020-05-19 RX ADMIN — METHOCARBAMOL SCH MG: 500 TABLET ORAL at 21:58

## 2020-05-19 RX ADMIN — POTASSIUM CHLORIDE SCH MLS/HR: 29.8 INJECTION, SOLUTION INTRAVENOUS at 03:13

## 2020-05-19 RX ADMIN — PREGABALIN SCH MG: 75 CAPSULE ORAL at 09:53

## 2020-05-19 RX ADMIN — HEPARIN SODIUM SCH UNIT: 5000 INJECTION, SOLUTION INTRAVENOUS; SUBCUTANEOUS at 05:34

## 2020-05-19 RX ADMIN — DEXAMETHASONE SODIUM PHOSPHATE PRN MG: 10 INJECTION INTRAMUSCULAR; INTRAVENOUS at 08:25

## 2020-05-19 RX ADMIN — OXYCODONE HYDROCHLORIDE SCH MG: 10 TABLET, FILM COATED, EXTENDED RELEASE ORAL at 20:16

## 2020-05-19 RX ADMIN — PANTOPRAZOLE SODIUM SCH MG: 40 INJECTION, POWDER, FOR SOLUTION INTRAVENOUS at 09:50

## 2020-05-19 RX ADMIN — Medication SCH ML: at 05:34

## 2020-05-19 RX ADMIN — ASPIRIN SCH MG: 81 TABLET, COATED ORAL at 09:52

## 2020-05-19 RX ADMIN — SODIUM CHLORIDE PRN MLS/HR: 3 INJECTION, SOLUTION INTRAVENOUS at 12:50

## 2020-05-19 RX ADMIN — METHOCARBAMOL SCH MG: 500 TABLET ORAL at 09:52

## 2020-05-19 RX ADMIN — HEPARIN SODIUM SCH UNIT: 5000 INJECTION, SOLUTION INTRAVENOUS; SUBCUTANEOUS at 21:57

## 2020-05-19 RX ADMIN — CARVEDILOL SCH MG: 12.5 TABLET, FILM COATED ORAL at 09:50

## 2020-05-19 RX ADMIN — SIMVASTATIN SCH MG: 40 TABLET, FILM COATED ORAL at 17:47

## 2020-05-19 RX ADMIN — METHOCARBAMOL SCH MG: 500 TABLET ORAL at 17:48

## 2020-05-19 RX ADMIN — METHOCARBAMOL SCH MG: 500 TABLET ORAL at 14:01

## 2020-05-19 RX ADMIN — NITROGLYCERIN SCH EACH: 0.4 PATCH TRANSDERMAL at 10:00

## 2020-05-19 RX ADMIN — HEPARIN SODIUM SCH UNIT: 5000 INJECTION, SOLUTION INTRAVENOUS; SUBCUTANEOUS at 14:02

## 2020-05-19 RX ADMIN — Medication SCH ML: at 14:01

## 2020-05-19 RX ADMIN — POTASSIUM CHLORIDE SCH MLS/HR: 29.8 INJECTION, SOLUTION INTRAVENOUS at 05:34

## 2020-05-19 RX ADMIN — BENAZEPRIL HYDROCHLORIDE SCH MG: 5 TABLET ORAL at 21:57

## 2020-05-19 RX ADMIN — TEMAZEPAM SCH MG: 15 CAPSULE ORAL at 21:58

## 2020-05-19 NOTE — PD[facility] CRITICAL CARE PROG REPORT
General


Date:: 05/19/20


ICU Day:: 2


Hospital Day:: 2


Resuscitation Status: Full Code


Events in the past 12 to 24 Hours:: 


This 74-year-old  female reformed smoker presented to [facility] emergency department on 5/18/2020 with generalized weakness, associated

with decreased appetite, recurrent emesis and shortness of breath.  She reported

recent treatment for "pneumonia" and "persistent pneumonia" with 210-day courses

of amoxicillin.  She also reported having nausea and emesis during this time 

(preceding antibiotic therapy).  In the emergency department, she was found to 

have sodium 111 with an elevated lipase.  She was admitted to the ICU for 

treatment with 3% saline for severe hyponatremia.





5/19: Sodium 116, rising at an optimal rate of 0.5 mEq/h.  Nausea controlled 

with Zofran.  Tolerating p.o.  Still on 3% saline at 30 mL/h.  The patient is 

undergoing treatment for hypotonic, euvolemic hyponatremia.  The patient does 

have elevated triglycerides) 269); however, it appears that she has SIADH.  On 

physical exam, the patient is noted to have a malar rash and diffuse 

maculopapular rash involving her whole body (neck, torso, extensor surfaces of 

limbs).  She denies any history of lupus.


Review of systems relevant to events:: 


Gastrointestinal: Nausea/emesis.


Renal/metabolic: Hyponatremia


Cutaneous: Rash


Pulmonary: Abnormal chest x-ray


Reason for ICU Addmission:: severe hyponatremia





- Medications:


Medications reviewed and adjusted accordingly: Yes





Physical Exam


Vital Signs: 


                                        











Temp Pulse Resp BP Pulse Ox


 


 98.6 F   60   14   148/58 H  97 


 


 05/19/20 10:23  05/19/20 07:20  05/19/20 10:23  05/19/20 10:23  05/19/20 10:23








                                 Intake & Output











 05/18/20 05/19/20 05/20/20





 06:59 06:59 06:59


 


Intake Total  1397 1150


 


Output Total  1025 305


 


Balance  372 845


 


Weight  71 kg 72.7 kg








                                  Weight/Height





Weight                           72.7 kg


Height                           1.7 m








General appearance: PRESENT: no acute distress, well-developed, well-nourished


Head exam: PRESENT: atraumatic, normocephalic


Eye exam: PRESENT: conjunctiva pink, EOMI, PERRLA.  ABSENT: scleral icterus


Mouth exam: PRESENT: dry mucosa


Neck exam: ABSENT: carotid bruit, JVD, lymphadenopathy, thyromegaly


Respiratory exam: PRESENT: clear to auscultation sancho.  ABSENT: rales, rhonchi, 

wheezes


Cardiovascular exam: PRESENT: RRR, systolic murmur - holosystolic (mitral).  

ABSENT: diastolic murmur, rubs


Pulses: PRESENT: normal dorsalis pedis pul


GI/Abdominal exam: PRESENT: normal bowel sounds, soft.  ABSENT: distended, 

guarding, mass, organolmegaly, rebound, tenderness


Extremities exam: PRESENT: full ROM.  ABSENT: calf tenderness, clubbing, pedal 

edema


Musculoskeletal exam: PRESENT: normal inspection.  ABSENT: deformity


Neurological exam: PRESENT: alert, awake, oriented to person, oriented to place,

oriented to time, oriented to situation, CN II-XII grossly intact.  ABSENT: 

motor sensory deficit


Skin exam: PRESENT: dry, rash - Malar rash; diffuse maculopapular rash involving

neck, torso and extremities.  Pruritus.  ABSENT: abrasion, cyanosis, mottled, 

vesicles





Laboratory/Radiographs


Laboratory Results: 


                                        





                                 05/19/20 04:20 





                                 05/19/20 08:13 





                                        











  05/18/20 05/18/20 05/18/20





  13:09 13:09 13:09


 


WBC  Cancelled  


 


RBC  Cancelled  


 


Hgb  Cancelled  


 


Hct  Cancelled  


 


MCV  Cancelled  


 


MCH  Cancelled  


 


MCHC  Cancelled  


 


RDW  Cancelled  


 


Plt Count  Cancelled  


 


Seg Neutrophils %  Cancelled  


 


Sodium   111.1 L* 


 


Potassium   3.6 


 


Chloride   69 L 


 


Carbon Dioxide   30 


 


Anion Gap   12 


 


BUN   17 


 


Creatinine   0.85 


 


Est GFR ( Amer)   > 60 


 


Glucose   101 


 


Serum Osmolality   


 


Calcium   10.6 H 


 


Phosphorus   


 


Magnesium   


 


Total Bilirubin   1.1 


 


AST   63 H 


 


Alkaline Phosphatase   47 


 


Total Protein   7.5 


 


Albumin   5.0 


 


Triglycerides   


 


Lipase    847.9 H


 


TSH   


 


Free T4   


 


Free T3 pg/mL   


 


Urine Color   


 


Urine Appearance   


 


Urine pH   


 


Ur Specific Gravity   


 


Urine Protein   


 


Urine Glucose (UA)   


 


Urine Ketones   


 


Urine Blood   


 


Urine Nitrite   


 


Ur Leukocyte Esterase   


 


Urine WBC (Auto)   


 


Urine RBC (Auto)   


 


Urine Osmolality   














  05/18/20 05/18/20 05/18/20





  13:09 13:55 13:55


 


WBC   


 


RBC   


 


Hgb   


 


Hct   


 


MCV   


 


MCH   


 


MCHC   


 


RDW   


 


Plt Count   


 


Seg Neutrophils %   


 


Sodium   


 


Potassium   


 


Chloride   


 


Carbon Dioxide   


 


Anion Gap   


 


BUN   


 


Creatinine   


 


Est GFR (African Amer)   


 


Glucose   


 


Serum Osmolality  228 L  


 


Calcium   


 


Phosphorus   


 


Magnesium   


 


Total Bilirubin   


 


AST   


 


Alkaline Phosphatase   


 


Total Protein   


 


Albumin   


 


Triglycerides   


 


Lipase   


 


TSH   


 


Free T4   


 


Free T3 pg/mL   


 


Urine Color   YELLOW 


 


Urine Appearance   SLIGHTLY-CLOUDY 


 


Urine pH   5.0 


 


Ur Specific Gravity   1.024 


 


Urine Protein   30 H 


 


Urine Glucose (UA)   NEGATIVE 


 


Urine Ketones   TRACE H 


 


Urine Blood   NEGATIVE 


 


Urine Nitrite   NEGATIVE 


 


Ur Leukocyte Esterase   NEGATIVE 


 


Urine WBC (Auto)   3 


 


Urine RBC (Auto)   2 


 


Urine Osmolality    522














  05/18/20 05/18/20 05/18/20





  14:38 17:20 17:20


 


WBC  4.9  


 


RBC    


 


Hgb  13.8  


 


Hct  42.0  


 


MCV    


 


MCH    


 


MCHC    


 


RDW    


 


Plt Count  175  


 


Seg Neutrophils %  65.9  


 


Sodium   111.3 L* 


 


Potassium   3.2 L 


 


Chloride   73 L 


 


Carbon Dioxide   27 


 


Anion Gap   11 


 


BUN   16 


 


Creatinine   0.76 


 


Est GFR ( Amer)   > 60 


 


Glucose   86 


 


Serum Osmolality   


 


Calcium   9.6 


 


Phosphorus   


 


Magnesium   


 


Total Bilirubin   


 


AST   


 


Alkaline Phosphatase   


 


Total Protein   


 


Albumin   


 


Triglycerides   


 


Lipase   


 


TSH   


 


Free T4    1.60


 


Free T3 pg/mL    3.54


 


Urine Color   


 


Urine Appearance   


 


Urine pH   


 


Ur Specific Gravity   


 


Urine Protein   


 


Urine Glucose (UA)   


 


Urine Ketones   


 


Urine Blood   


 


Urine Nitrite   


 


Ur Leukocyte Esterase   


 


Urine WBC (Auto)   


 


Urine RBC (Auto)   


 


Urine Osmolality   














  05/18/20 05/18/20 05/18/20





  17:20 17:20 20:19


 


WBC   


 


RBC   


 


Hgb   


 


Hct   


 


MCV   


 


MCH   


 


MCHC   


 


RDW   


 


Plt Count   


 


Seg Neutrophils %   


 


Sodium    112.2 L*


 


Potassium    2.9 L*


 


Chloride    72 L


 


Carbon Dioxide    30


 


Anion Gap    10


 


BUN    14


 


Creatinine    0.79


 


Est GFR ( Amer)    > 60


 


Glucose    85


 


Serum Osmolality   


 


Calcium    9.5


 


Phosphorus    3.3


 


Magnesium   


 


Total Bilirubin   


 


AST   


 


Alkaline Phosphatase   


 


Total Protein   


 


Albumin   


 


Triglycerides  269 H  


 


Lipase   


 


TSH   1.00 


 


Free T4   


 


Free T3 pg/mL   


 


Urine Color   


 


Urine Appearance   


 


Urine pH   


 


Ur Specific Gravity   


 


Urine Protein   


 


Urine Glucose (UA)   


 


Urine Ketones   


 


Urine Blood   


 


Urine Nitrite   


 


Ur Leukocyte Esterase   


 


Urine WBC (Auto)   


 


Urine RBC (Auto)   


 


Urine Osmolality   














  05/18/20 05/19/20 05/19/20





  20:19 00:41 04:20


 


WBC    3.5 L


 


RBC    3.75


 


Hgb    12.2


 


Hct    33.3 L


 


MCV    89


 


MCH    32.6


 


MCHC    36.6 H


 


RDW    12.9


 


Plt Count    131 L


 


Seg Neutrophils %    49.3


 


Sodium   112.7 L* 


 


Potassium   2.7 L* 


 


Chloride   75 L 


 


Carbon Dioxide   27 


 


Anion Gap   11 


 


BUN   12 


 


Creatinine   0.67 


 


Est GFR ( Amer)   > 60 


 


Glucose   88 


 


Serum Osmolality   


 


Calcium   8.7 


 


Phosphorus   


 


Magnesium  1.4 L  


 


Total Bilirubin   


 


AST   


 


Alkaline Phosphatase   


 


Total Protein   


 


Albumin   


 


Triglycerides   


 


Lipase   


 


TSH   


 


Free T4   


 


Free T3 pg/mL   


 


Urine Color   


 


Urine Appearance   


 


Urine pH   


 


Ur Specific Gravity   


 


Urine Protein   


 


Urine Glucose (UA)   


 


Urine Ketones   


 


Urine Blood   


 


Urine Nitrite   


 


Ur Leukocyte Esterase   


 


Urine WBC (Auto)   


 


Urine RBC (Auto)   


 


Urine Osmolality   














  05/19/20 05/19/20





  04:20 08:13


 


WBC  


 


RBC  


 


Hgb  


 


Hct  


 


MCV  


 


MCH  


 


MCHC  


 


RDW  


 


Plt Count  


 


Seg Neutrophils %  


 


Sodium  114.7 L*  116.7 L*


 


Potassium  3.2 L  3.6


 


Chloride  79 L  82 L


 


Carbon Dioxide  27  26


 


Anion Gap  9  9


 


BUN  10  10


 


Creatinine  0.65  0.54


 


Est GFR ( Amer)  > 60  > 60


 


Glucose  75  75


 


Serum Osmolality  


 


Calcium  8.5  8.6


 


Phosphorus  


 


Magnesium  1.8 


 


Total Bilirubin  0.6 


 


AST  47 H 


 


Alkaline Phosphatase  34 L 


 


Total Protein  5.5 L 


 


Albumin  3.4 L 


 


Triglycerides  


 


Lipase  794.4 H 


 


TSH  


 


Free T4  


 


Free T3 pg/mL  


 


Urine Color  


 


Urine Appearance  


 


Urine pH  


 


Ur Specific Gravity  


 


Urine Protein  


 


Urine Glucose (UA)  


 


Urine Ketones  


 


Urine Blood  


 


Urine Nitrite  


 


Ur Leukocyte Esterase  


 


Urine WBC (Auto)  


 


Urine RBC (Auto)  


 


Urine Osmolality  








                                        











  05/18/20 05/18/20





  14:15 14:15


 


Troponin I   0.049


 


NT-Pro-B Natriuret Pep  247 











Impressions: 


                                        





Acute Abdomen Series  05/18/20 15:06


IMPRESSION:  Constipation.  No obstruction.


 











All labs, radiographs, diagnostic studies and EKGs were personally reviewed: Yes


In addition, reports of radiographic and diagnostic studies were read: Yes





Assessment and Plan





- Diagnosis


(1) Abnormal chest x-ray


Is this a current diagnosis for this admission?: Yes   


Plan: 


This patient's clinical course and presentation with a history of "persistent 

pneumonia" raise conference urn for the possibility of an underlying malignancy.


Chest CT with contrast.








(2) Maculopapular rash, generalized


Is this a current diagnosis for this admission?: Yes   


Plan: 


Check SSA, SSB, anti-Mariann 1 RNP, creatinine kinase, aldolase.


Punch biopsy may be warranted.








(3) Malar rash


Is this a current diagnosis for this admission?: Yes   


Plan: 


Check ROSEANNE, anti-double-stranded DNA, antiphospholipid's, complement panel, ESR, 

CRP.








(4) Hyponatremia with decreased serum osmolality


Is this a current diagnosis for this admission?: Yes   


Plan: 


Lab called to provide a corrected result.  The patient's initial serum 

osmolality yesterday was 228 (not 288).


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  Clinical presentation 

is concerning for underlying malignancy.


She has hypothyroidism, controlled with Synthroid.  She does have a history of 

congestive heart failure, but her proBNP was within normal limits.  Check random

cortisol (may need ACTH stimulation test although clinically she shows no signs 

of adrenal insufficiency).  Thiazide diuretic is on hold.











(5) Elevated lipase


Is this a current diagnosis for this admission?: Yes   


Plan: 


Downtrending.


Given normal renal function, contrast-enhanced CT of the abdomen would be 

helpful for evaluation of acute pancreatitis.  Also, this would allow chest CT 

imaging for further evaluation of potential causes for SIADH.


The patient does have hypertriglyceridemia, possibly explaining her elevated 

lipase/acute pancreatitis.  Continue TriCor (fenofibrate) 145 mg p.o. daily.


Notably, the patient recently did have 20 days of treatment with amoxicillin, 

possibly contributing to an elevated lipase.








(6) Leukopenia


Is this a current diagnosis for this admission?: Yes   


Plan: 


Of note, the patient developed leukopenia and thrombocytopenia today.  Also, her

hemoglobin is downtrending, raising concern for the possibility of impending 

pancytopenia.


Check Parvovirus B19 reticulocyte count, peripheral blood smear.








(7) Abnormal LFTs


Is this a current diagnosis for this admission?: Yes   


Plan: 


Repeat LFTs.








(8) Chronic combined systolic and diastolic CHF (congestive heart failure)


Is this a current diagnosis for this admission?: Yes   





(9) Hypothyroidism


Qualifiers: 


   Hypothyroidism type: unspecified   Qualified Code(s): E03.9 - Hypothyroidism,

unspecified   


Is this a current diagnosis for this admission?: Yes   


Plan: 


Continue Synthroid








(10) Hypertension


Qualifiers: 


   Hypertension type: essential hypertension   Qualified Code(s): I10 - 

Essential (primary) hypertension   


Is this a current diagnosis for this admission?: Yes   


Plan: 


On carvedilol 12.5 mg p.o. twice daily, benazepril 10 mg p.o. nightly.








(11) Thrombocytopenia


Is this a current diagnosis for this admission?: Yes   


Plan: 


Monitor CBC.








Critical Time


Critical Time (minutes): 60


Level of Care: ICU


-: 


1.  The care of a critical patient is a dynamic process.  This note is a 

representative synopsis but static in nature.  The timeframe for treatments 

given in order is not necessarily the actual time these treatments may have been

done.





2.  This patient requires critical care secondary to ongoing requirements for 

therapy not offered or safe outside the critical care environment.  Transfer to 

a lower level of care will result in altered life or limb morbidity and 

mortality.





3.  Multidisciplinary rounds completed.





4.  ABCDE bundle addressed.

## 2020-05-19 NOTE — CRITICAL CARE ADMISSION REPORT
HPI


Date:: 05/18/20


Time:: 15:47


Reason for ICU Reason:: severe hyponatremia


HPI: 


This 74-year-old female non-smoker presented to [facility] 

emergency department with generalized weakness, associated with decreased 

appetite recurrent emesis and shortness of breath.  The patient reports she has 

been undergoing treatment for "pneumonia" for approximately 2 weeks.  She 

completed a 10-day course of amoxicillin.  However, she was found to have 

"persistent pneumonia" and was started on another 10-day course of amoxicillin 2

days ago.  Patient reports that she has been having emesis off and on for the 

past 2 weeks.  No known sick contacts.





In the emergency department, the patient's serum sodium was found to be 111.  Of

note, she does have a history of congestive heart failure and hypothyroidism.  

She also uses chlorthalidone for hypertension.  Again, she also has recently 

been diagnosed with "pneumonia".





- Diagnosis/Plan


(1) Elevated lipase


Is this a current diagnosis for this admission?: Yes   


Plan: 


NPO for now.


Trend CBC.


Check triglycerides.


If persistent nausea, NPO.








(2) Hyponatremia with decreased serum osmolality


Is this a current diagnosis for this admission?: Yes   


Plan: 


Hold chlorthalidone.


Check pro-BNP and procalcitonin.


CXR now.


Check TSH.


Check serum and urine osmolality. Check Ml, UCl.


I suspect that the patient's chemistry and urine reflect volume contraction, 

owing to recurrent emesis and poor oral intake.  NS1 L bolus should be followed 

with repeat BMP.  If Na shows approriate rise, she may tolerate rehydration with

isotonic saline.  If the Na fails to rise appropriately, 3% saline infusion will

need to be considered (and will warrant ICU admission).


Target Na 124 @ 1900 on 5/19.  Will start with 3% saline bolus 150 mL over 20 

min followed by BMP check.








(3) Abnormal LFTs


Is this a current diagnosis for this admission?: Yes   


Plan: 


Check troponin.


Check 12-lead EKG.








(4) Chronic combined systolic and diastolic CHF (congestive heart failure)


Is this a current diagnosis for this admission?: Yes   


Plan: 


Check pro-BNP.


SARS-2-CoV test pending.  Low clinical suspicion.











(5) Hypothyroidism


Qualifiers: 


   Hypothyroidism type: unspecified   Qualified Code(s): E03.9 - Hypothyroidism,

unspecified   


Is this a current diagnosis for this admission?: Yes   


Plan: 


Continue Synthroid.








(6) Hypertension


Qualifiers: 


   Hypertension type: essential hypertension   Qualified Code(s): I10 - 

Essential (primary) hypertension   


Is this a current diagnosis for this admission?: Yes   





Past Medical History


Cardiac Medical History: Reports: Atrial Fibrillation, Congestive Heart Failure,

Hyperlipidema, Hypertension - MEDS


   Denies: Myocardial Infarction


Pulmonary Medical History: Reports: Chronic Obstructive Pulmonary Disease (COPD)


   Denies: Asthma


Neurological Medical History: 


   Denies: Seizures


Endocrine Medical History: Reports: Hypothyroidism


GI Medical History: Reports: Gastroesophageal Reflux Disease, Hiatal Hernia - 

YEARS AGO


   Denies: Hepatitis


Musculoskeltal Medical History: Reports: Arthritis


Hematology: 


   Denies: Anemia, Sickle Cell Disease





Past Surgical History


Past Surgical History: Reports: Cardiac Catheterization, Hysterectomy, Internal 

Defibrillator, Orthopedic Surgery - BACK, Pacemaker - 2008, Tonsillectomy, Other

- Back surgery Aortofemoral bypass


   Denies: Amputation, Mastectomy





Social/Family History





- Social History


Lives with: Family


Smoking Status: Never Smoker


Frequency of Alcohol Use: None


Hx Recreational Drug Use: No


Hx Prescription Drug Abuse: No





- Medication/Allergies


Home Medications: 








Aspirin [Ecotrin] 81 mg PO DAILY 09/18/18 


Benazepril HCl [Lotensin 5 mg Tablet] 10 mg PO QHS 09/18/18 


Carvedilol [Coreg 12.5 mg Tablet] 12.5 mg PO Q12 09/18/18 


Chlorthalidone [Chlorthalidone 25 mg Tablet] 25 mg PO QAM 09/18/18 


Estradiol [Estrace] 1 mg PO QHS 09/18/18 


Fenofibrate Nanocrystallized [Tricor 145 mg Tablet] 145 mg PO DAILY 09/18/18 


Levothyroxine Sodium [Synthroid] 25 mcg PO Q6AM 09/18/18 


Oxycodone HCl [Oxycontin] 20 mg PO Q12 09/18/18 


Oxycodone HCl/Acetaminophen [Percocet 7.5-325 mg Tablet] 1 tab PO Q6HP PRN 

09/18/18 


Polyethylene Glycol 3350 [Miralax Powder 17 gm/Packet] 1 packet PO DAILY 

09/18/18 


Potassium Chloride [Klor-Con M10] 20 meq PO BID 09/18/18 


Pregabalin [Lyrica 75 mg Capsule] 75 mg PO DAILY 09/18/18 


Simvastatin [Zocor 40 mg Tablet] 40 mg PO QPM 09/18/18 


Temazepam [Restoril 15 mg Capsule] 15 mg PO QHS 09/18/18 


Acyclovir [Zovirax 200 mg Capsule] 200 mg PO Q8 05/18/20 


Albuterol Sulfate [Ventolin Hfa 8 gm Mdi] 1 puff IH Q4HP PRN 05/18/20 


Amoxicillin/Potassium Clav [Amox-Clav 875-125 mg Tablet] 1 each PO Q12 MDD FOR 

10 DAYS 05/18/20 


Aspirin [Adult Low Dose Aspirin EC] 81 mg PO DAILY 05/18/20 


Betamethasone Dipropionate 1 applic TOP BID 05/18/20 


Cholecalciferol (Vitamin D3) [Vitamin D3 1000 Unit Tablet] 1,000 unit PO DAILY 

05/18/20 


Clotrimazole/Betamethasone Dip [Lotrisone Cream] 1 applic TOP BID 05/18/20 


Cyanocobalamin (Vitamin B-12) [Vitamin B-12 1000 mcg Tablet] 1,000 mcg PO DAILY 

05/18/20 


Estradiol [Estrace] 1 applic PV MOFR 05/18/20 


Ferrous Sulfate [Feosol 325 mg Tablet] 325 mg PO DAILY 05/18/20 


Lansoprazole 30 mg PO QAM 05/18/20 


Lidocaine [Lidoderm 5% (700 mg) Transdermal Patch] 1 patch TD DAILY 05/18/20 


Methocarbamol [Robaxin 500 mg Tablet] 500 mg PO QID 05/18/20 


Nitroglycerin [Nitro-Dur 10 mg (0.4MG/Hr) Transdermal Patch] 0.4 mg TD DAILY 

05/18/20 


Nitroglycerin [Nitrostat 0.4 mg (1/150 Gr) Tabs 25/Bottle] 0.4 mg SL Q5MP PRN 

05/18/20 


Nystatin [Mycostatin 500,000 Unit/5 ml Susp Udcup] 5 ml PO TID MDD SWISH AND 

SWALLOW 05/18/20 


Ondansetron [Zofran Odt 4 mg Tablet] 4 mg PO Q8HP PRN 05/18/20 


Umeclidinium Brm/Vilanterol Tr [Anoro Ellipta 62.5-25 Mcg INH] 1 puff IH DAILY 

05/18/20 








Allergies/Adverse Reactions: 


                                        





levofloxacin [From Levaquin] Allergy (Severe, Verified 05/19/20 08:50)


   Tachycardia


gabapentin [From Neurontin] Allergy (Intermediate, Verified 05/19/20 08:50)


   CONFUSED/TREMORS


polymyxin B sulfate [From Polysporin] Allergy (Mild, Verified 05/19/20 08:50)


   IRRITATION, HIVES











Review of Systems


Constitutional: PRESENT: fatigue, weakness.  ABSENT: chills, fever(s)


Cardiovascular: ABSENT: chest pain


Respiratory: PRESENT: dyspnea.  ABSENT: cough, sputum


Gastrointestinal: PRESENT: nausea, vomiting


Neurological: PRESENT: dizziness





Physical Exam


Vital Signs: 


                                        











Temp Pulse Resp BP Pulse Ox


 


 97.9 F   60   14   170/61 H  100 


 


 05/18/20 13:23  05/18/20 13:23  05/18/20 13:23  05/18/20 13:23  05/18/20 13:23








                                 Intake & Output











 05/17/20 05/18/20 05/19/20





 06:59 06:59 06:59


 


Weight   71.668 kg








                                  Weight/Height





Weight                           71.668 kg


Height                           1.7 m











Laboratory/Radiographs


Laboratory Results: 


                                        





                                 05/18/20 14:38 





                                 05/18/20 13:09 





                                        











  05/18/20 05/18/20 05/18/20





  13:09 13:09 13:09


 


WBC  Cancelled  


 


RBC  Cancelled  


 


Hgb  Cancelled  


 


Hct  Cancelled  


 


MCV  Cancelled  


 


MCH  Cancelled  


 


MCHC  Cancelled  


 


RDW  Cancelled  


 


Plt Count  Cancelled  


 


Seg Neutrophils %  Cancelled  


 


Sodium   111.1 L* 


 


Potassium   3.6 


 


Chloride   69 L 


 


Carbon Dioxide   30 


 


Anion Gap   12 


 


BUN   17 


 


Creatinine   0.85 


 


Est GFR ( Amer)   > 60 


 


Glucose   101 


 


Calcium   10.6 H 


 


Total Bilirubin   1.1 


 


AST   63 H 


 


Alkaline Phosphatase   47 


 


Total Protein   7.5 


 


Albumin   5.0 


 


Lipase    847.9 H


 


Urine Color   


 


Urine Appearance   


 


Urine pH   


 


Ur Specific Gravity   


 


Urine Protein   


 


Urine Glucose (UA)   


 


Urine Ketones   


 


Urine Blood   


 


Urine Nitrite   


 


Ur Leukocyte Esterase   


 


Urine WBC (Auto)   


 


Urine RBC (Auto)   














  05/18/20 05/18/20





  13:55 14:38


 


WBC   4.9


 


RBC   


 


Hgb   13.8


 


Hct   42.0


 


MCV   


 


MCH   


 


MCHC   


 


RDW   


 


Plt Count   175


 


Seg Neutrophils %   65.9


 


Sodium  


 


Potassium  


 


Chloride  


 


Carbon Dioxide  


 


Anion Gap  


 


BUN  


 


Creatinine  


 


Est GFR (African Amer)  


 


Glucose  


 


Calcium  


 


Total Bilirubin  


 


AST  


 


Alkaline Phosphatase  


 


Total Protein  


 


Albumin  


 


Lipase  


 


Urine Color  YELLOW 


 


Urine Appearance  SLIGHTLY-CLOUDY 


 


Urine pH  5.0 


 


Ur Specific Gravity  1.024 


 


Urine Protein  30 H 


 


Urine Glucose (UA)  NEGATIVE 


 


Urine Ketones  TRACE H 


 


Urine Blood  NEGATIVE 


 


Urine Nitrite  NEGATIVE 


 


Ur Leukocyte Esterase  NEGATIVE 


 


Urine WBC (Auto)  3 


 


Urine RBC (Auto)  2 








                                        











  05/18/20





  14:15


 


NT-Pro-B Natriuret Pep  247











All labs, radiographs, diagnostic studies and EKGs were personally reviewed: Yes


In addition, reports of radiographic and diagnostic studies were read: Yes





Critical Time


Critical Time (minutes): 0


-: 


The care of a critically ill patient is dynamic.  This note represents a static 

moment in the admission process. Orders and treatments may be given 

simultaneously and urgently, and time is not representative of the treatment 

process.





This patient requires Critical Care secondary to life threatening organ or limb 

dysfunction.  Without Critical Care services, the patient is at risk for 

increased mortality and morbidity.

## 2020-05-20 LAB
ADD MANUAL DIFF: NO
ALBUMIN SERPL-MCNC: 3.5 G/DL (ref 3.5–5)
ALP SERPL-CCNC: 35 U/L (ref 38–126)
ANION GAP SERPL CALC-SCNC: 7 MMOL/L (ref 5–19)
ANION GAP SERPL CALC-SCNC: 7 MMOL/L (ref 5–19)
ANION GAP SERPL CALC-SCNC: 8 MMOL/L (ref 5–19)
ANION GAP SERPL CALC-SCNC: 8 MMOL/L (ref 5–19)
AST SERPL-CCNC: 37 U/L (ref 14–36)
BASOPHILS # BLD AUTO: 0 10^3/UL (ref 0–0.2)
BASOPHILS NFR BLD AUTO: 0.5 % (ref 0–2)
BILIRUB DIRECT SERPL-MCNC: 0 MG/DL (ref 0–0.4)
BILIRUB SERPL-MCNC: 0.5 MG/DL (ref 0.2–1.3)
BUN SERPL-MCNC: 6 MG/DL (ref 7–20)
BUN SERPL-MCNC: 6 MG/DL (ref 7–20)
BUN SERPL-MCNC: 7 MG/DL (ref 7–20)
BUN SERPL-MCNC: 9 MG/DL (ref 7–20)
CALCIUM: 8.7 MG/DL (ref 8.4–10.2)
CALCIUM: 8.8 MG/DL (ref 8.4–10.2)
CALCIUM: 8.8 MG/DL (ref 8.4–10.2)
CALCIUM: 8.9 MG/DL (ref 8.4–10.2)
CHLORIDE SERPL-SCNC: 91 MMOL/L (ref 98–107)
CHLORIDE SERPL-SCNC: 93 MMOL/L (ref 98–107)
CHLORIDE SERPL-SCNC: 95 MMOL/L (ref 98–107)
CHLORIDE SERPL-SCNC: 95 MMOL/L (ref 98–107)
CO2 SERPL-SCNC: 24 MMOL/L (ref 22–30)
CO2 SERPL-SCNC: 25 MMOL/L (ref 22–30)
CO2 SERPL-SCNC: 26 MMOL/L (ref 22–30)
CO2 SERPL-SCNC: 26 MMOL/L (ref 22–30)
EOSINOPHIL # BLD AUTO: 0.3 10^3/UL (ref 0–0.6)
EOSINOPHIL NFR BLD AUTO: 5.5 % (ref 0–6)
ERYTHROCYTE [DISTWIDTH] IN BLOOD BY AUTOMATED COUNT: 13.1 % (ref 11.5–14)
GLUCOSE SERPL-MCNC: 116 MG/DL (ref 75–110)
GLUCOSE SERPL-MCNC: 132 MG/DL (ref 75–110)
GLUCOSE SERPL-MCNC: 157 MG/DL (ref 75–110)
GLUCOSE SERPL-MCNC: 90 MG/DL (ref 75–110)
HCT VFR BLD CALC: 34.7 % (ref 36–47)
HGB BLD-MCNC: 12.4 G/DL (ref 12–15.5)
LYMPHOCYTES # BLD AUTO: 0.5 10^3/UL (ref 0.5–4.7)
LYMPHOCYTES NFR BLD AUTO: 10.2 % (ref 13–45)
MCH RBC QN AUTO: 32.4 PG (ref 27–33.4)
MCHC RBC AUTO-ENTMCNC: 35.9 G/DL (ref 32–36)
MCV RBC AUTO: 90 FL (ref 80–97)
MONOCYTES # BLD AUTO: 0.5 10^3/UL (ref 0.1–1.4)
MONOCYTES NFR BLD AUTO: 9.6 % (ref 3–13)
NEUTROPHILS # BLD AUTO: 3.8 10^3/UL (ref 1.7–8.2)
NEUTS SEG NFR BLD AUTO: 74.2 % (ref 42–78)
PHOSPHATE SERPL-MCNC: 2 MG/DL (ref 2.5–4.5)
PLATELET # BLD: 155 10^3/UL (ref 150–450)
POTASSIUM SERPL-SCNC: 3.7 MMOL/L (ref 3.6–5)
POTASSIUM SERPL-SCNC: 3.8 MMOL/L (ref 3.6–5)
POTASSIUM SERPL-SCNC: 3.8 MMOL/L (ref 3.6–5)
POTASSIUM SERPL-SCNC: 3.9 MMOL/L (ref 3.6–5)
PROT SERPL-MCNC: 5.7 G/DL (ref 6.3–8.2)
RBC # BLD AUTO: 3.84 10^6/UL (ref 3.72–5.28)
SMITH AB ANA: <0.2 AI (ref 0–0.9)
TOTAL CELLS COUNTED % (AUTO): 100 %
WBC # BLD AUTO: 5.1 10^3/UL (ref 4–10.5)

## 2020-05-20 RX ADMIN — LEVOTHYROXINE SODIUM SCH MG: 25 TABLET ORAL at 07:56

## 2020-05-20 RX ADMIN — SIMVASTATIN SCH MG: 40 TABLET, FILM COATED ORAL at 18:03

## 2020-05-20 RX ADMIN — HEPARIN SODIUM SCH UNIT: 5000 INJECTION, SOLUTION INTRAVENOUS; SUBCUTANEOUS at 14:06

## 2020-05-20 RX ADMIN — HEPARIN SODIUM SCH UNIT: 5000 INJECTION, SOLUTION INTRAVENOUS; SUBCUTANEOUS at 06:29

## 2020-05-20 RX ADMIN — LIDOCAINE SCH: 50 PATCH CUTANEOUS at 11:34

## 2020-05-20 RX ADMIN — NITROGLYCERIN SCH EACH: 0.4 PATCH TRANSDERMAL at 10:19

## 2020-05-20 RX ADMIN — FENOFIBRATE SCH MG: 145 TABLET ORAL at 10:19

## 2020-05-20 RX ADMIN — METHOCARBAMOL SCH MG: 500 TABLET ORAL at 18:03

## 2020-05-20 RX ADMIN — LIDOCAINE SCH PATCH: 50 PATCH CUTANEOUS at 02:33

## 2020-05-20 RX ADMIN — DEXAMETHASONE SODIUM PHOSPHATE PRN MG: 10 INJECTION INTRAMUSCULAR; INTRAVENOUS at 08:36

## 2020-05-20 RX ADMIN — MORPHINE SULFATE PRN MG: 10 INJECTION INTRAMUSCULAR; INTRAVENOUS; SUBCUTANEOUS at 12:32

## 2020-05-20 RX ADMIN — PREGABALIN SCH MG: 75 CAPSULE ORAL at 09:53

## 2020-05-20 RX ADMIN — OXYCODONE HYDROCHLORIDE PRN MG: 5 TABLET ORAL at 20:24

## 2020-05-20 RX ADMIN — OXYCODONE HYDROCHLORIDE SCH MG: 10 TABLET, FILM COATED, EXTENDED RELEASE ORAL at 09:53

## 2020-05-20 RX ADMIN — Medication SCH: at 05:01

## 2020-05-20 RX ADMIN — HEPARIN SODIUM SCH UNIT: 5000 INJECTION, SOLUTION INTRAVENOUS; SUBCUTANEOUS at 23:10

## 2020-05-20 RX ADMIN — METHOCARBAMOL SCH MG: 500 TABLET ORAL at 14:06

## 2020-05-20 RX ADMIN — METHOCARBAMOL SCH MG: 500 TABLET ORAL at 23:09

## 2020-05-20 RX ADMIN — MORPHINE SULFATE PRN MG: 10 INJECTION INTRAMUSCULAR; INTRAVENOUS; SUBCUTANEOUS at 08:31

## 2020-05-20 RX ADMIN — SODIUM CHLORIDE PRN MLS/HR: 3 INJECTION, SOLUTION INTRAVENOUS at 18:31

## 2020-05-20 RX ADMIN — METHOCARBAMOL SCH MG: 500 TABLET ORAL at 10:19

## 2020-05-20 RX ADMIN — CARVEDILOL SCH MG: 12.5 TABLET, FILM COATED ORAL at 10:19

## 2020-05-20 RX ADMIN — OXYCODONE HYDROCHLORIDE PRN MG: 5 TABLET ORAL at 14:06

## 2020-05-20 RX ADMIN — SODIUM CHLORIDE PRN MLS/HR: 3 INJECTION, SOLUTION INTRAVENOUS at 08:31

## 2020-05-20 RX ADMIN — PANTOPRAZOLE SODIUM SCH MG: 40 INJECTION, POWDER, FOR SOLUTION INTRAVENOUS at 09:53

## 2020-05-20 RX ADMIN — OXYCODONE HYDROCHLORIDE SCH MG: 10 TABLET, FILM COATED, EXTENDED RELEASE ORAL at 23:09

## 2020-05-20 RX ADMIN — OXYCODONE HYDROCHLORIDE PRN MG: 5 TABLET ORAL at 02:07

## 2020-05-20 RX ADMIN — BENAZEPRIL HYDROCHLORIDE SCH MG: 5 TABLET ORAL at 23:09

## 2020-05-20 RX ADMIN — Medication SCH: at 23:10

## 2020-05-20 RX ADMIN — OXYCODONE HYDROCHLORIDE PRN MG: 5 TABLET ORAL at 08:07

## 2020-05-20 RX ADMIN — CARVEDILOL SCH MG: 12.5 TABLET, FILM COATED ORAL at 23:09

## 2020-05-20 RX ADMIN — TEMAZEPAM SCH MG: 15 CAPSULE ORAL at 23:09

## 2020-05-20 RX ADMIN — Medication SCH: at 13:43

## 2020-05-20 RX ADMIN — ASPIRIN SCH MG: 81 TABLET, COATED ORAL at 09:53

## 2020-05-20 NOTE — RADIOLOGY REPORT (SQ)
EXAM DESCRIPTION:  CT CHEST WITH; CT ABD/PELVIS WITH IV ONLY



IMAGES COMPLETED DATE/TIME:  5/20/2020 6:15 am



REASON FOR STUDY:  CXR concerning for malignancy. creat 0.70; CXR concerning for malignancy and eleva
leeanne lipase. creat 0.70



CONTRAST TYPE AND DOSE:  contrast/concentration: Isovue 350.00 mg/ml; Total Contrast Delivered: 83.0 
ml; Total Saline Delivered: 37.0 ml



RENAL FUNCTION:  Not available at time of dictation.



COMPARISON:  None.



TECHNIQUE:  CT scan of the chest performed using helical scanning technique with dynamic intravenous 
contrast injection.  Images reviewed with lung, soft tissue and bone windows. Reconstructed coronal a
nd sagittal MPR images reviewed.  All images stored on PACS.

All CT scanners at this facility use dose modulation, iterative reconstruction, and/or weight based d
osing when appropriate to reduce radiation dose to as low as reasonably achievable (ALARA).

[facility]: Dose Right  [facility]: CareDose    [facility]: Dose Right    [facility]: Teradose 4D    [facility]: Smart SprainGo



RADIATION DOSE:  CT Rad equipment meets quality standard of care and radiation dose reduction techniq
ues were employed. CTDIvol: 13.7 - 16.1 mGy. DLP: 1685 mGy-cm. .



LIMITATIONS:  None.



FINDINGS:  AXILLAE: No adenopathy.

CHEST WALL: No masses.  No subcutaneous air.

LUNGS: Dense consolidation in the right upper lobe.  Underlying nodules cannot be excluded.  Stable a
pproximately 3.9 mm nodule in the periphery of the right lower lobe best demonstrated on series 4, im
age 42.  Minimal right basilar atelectasis.

PLEURA: Small right pleural effusion.

THYROID: No masses or significant asymmetry.

HILAR AND MEDIASTINAL STRUCTURES: 3.5 x 2.2 cm precarinal lymph node.  Previously this measured appro
ximately 2.0 x 1.1 cm.  There is sub- carinal adenopathy.  Possible right hilar mass and/or adenopath
y.  This is best demonstrated on series 3, image 35.  This measures 2.7 cm in diameter.

AORTA AND GREAT VESSELS: No aneurysm.  No dissection.

PULMONARY ARTERIES: No identified pulmonary emboli.  Study not optimized for the pulmonary arteries.

HEART: No pericardial effusion.

HARDWARE AND LIFELINES: Battery pack and leads are in place.

BONES: No significant finding.

OTHER: No other significant finding.



IMPRESSION:  Probable right central hilar mass with postobstructive pneumonia.  Recommend bronchoscop
y for further evaluation.  There is pathologic precarinal and subcarinal adenopathy.  There is a smal
l right pleural effusion.  Minimal right basilar atelectasis.  Stable 3.9 mm nodule in the periphery 
of the right lower lobe.



COMPARISON:  None.



RADIATION DOSE:  CT Rad equipment meets quality standard of care and radiation dose reduction techniq
ues were employed. CTDIvol: 13.7 - 16.1 mGy. DLP: 1685 mGy-cm. mGy.



TECHNIQUE:  CT scan of the abdomen and pelvis performed with intravenous and oral contrast using anthony
naima scanning technique with dynamic intravenous contrast injection.  Images reviewed with lung, soft 
tissue and bone windows.  Reconstructed coronal and sagittal MPR images reviewed.  Delayed images for
 evaluation of the urinary system also acquired and evaluated. All images stored on PACS.

All CT scanners at this facility use dose modulation, iterative reconstruction, and/or weight based d
osing when appropriate to reduce radiation dose to as low as reasonably achievable (ALARA).

[facility]: Dose Right  [facility]: SureCare    [facility]: Dose Right    [facility]: Teradose 4D    [facility]: Smart Technologies



FINDINGS:  LIVER: Normal size. No masses.  No dilated ducts.

SPLEEN: Normal size.  No focal lesions.

PANCREAS: No masses.  No significant calcifications.  No adjacent inflammation or peripancreatic flui
d collections.  Pancreatic duct not dilated.

GALLBLADDER: No identified stones by CT criteria. No inflammatory changes to suggest cholecystitis.

ADRENAL GLANDS: No significant masses or asymmetry.

RIGHT KIDNEY AND URETER: Small right renal cyst.  No solid lesions.   No significant calcifications. 
  Dilatation of the right renal pelvis no stones.  There is dilatation of the proximal right ureter d
istal ureter is decompressed.  This may represent chronic dilatation.  Delayed images demonstrate nor
mal excretion.

LEFT KIDNEY AND URETER: No solid masses.   No significant calcifications.   No hydronephrosis or hydr
oureter.

AORTA AND VESSELS: No aneurysm. No dissection. Renal arteries, SMA, celiac without stenosis.

RETROPERITONEUM: No retroperitoneal adenopathy, hemorrhage or masses.

LARGE AND SMALL BOWEL: No dilatation.  No masses.  No wall thickening.

APPENDIX: The appendix is not identified.  There are surgical clips in the right lower quadrant but n
o history of appendectomy.

ABDOMINAL WALL: No hernia or masses.

PERITONEAL CAVITY: No free air.  No free fluid.  No peritoneal implants or masses.

PELVIS: No mass or free fluid.  Normal bladder.

BONES: Degenerative changes throughout the lumbar spine.  Postsurgical changes as well.  No evidence 
of metastatic disease.

OTHER: No other significant finding.



IMPRESSION:  Dilatation of the right collecting system possibly chronic.  No stones are identified.  
No obstructing lesions.  No evidence of metastatic disease in the abdomen or pelvis.

Degenerative changes and postsurgical changes in the lumbar spine.



TECHNICAL DOCUMENTATION:  JOB ID:  [id number]

Quality ID # 436: Final reports with documentation of one or more dose reduction techniques (e.g., Au
tomated exposure control, adjustment of the mA and/or kV according to patient size, use of iterative 
reconstruction technique)

 2011 [employer]- All Rights Reserved



Reading location - IP/workstation name: [email or web address]

## 2020-05-20 NOTE — RADIOLOGY REPORT (SQ)
EXAM DESCRIPTION:  CT CHEST WITH; CT ABD/PELVIS WITH IV ONLY



IMAGES COMPLETED DATE/TIME:  5/20/2020 6:15 am



REASON FOR STUDY:  CXR concerning for malignancy. creat 0.70; CXR concerning for malignancy and eleva
leeanne lipase. creat 0.70



CONTRAST TYPE AND DOSE:  contrast/concentration: Isovue 350.00 mg/ml; Total Contrast Delivered: 83.0 
ml; Total Saline Delivered: 37.0 ml



RENAL FUNCTION:  Not available at time of dictation.



COMPARISON:  None.



TECHNIQUE:  CT scan of the chest performed using helical scanning technique with dynamic intravenous 
contrast injection.  Images reviewed with lung, soft tissue and bone windows. Reconstructed coronal a
nd sagittal MPR images reviewed.  All images stored on PACS.

All CT scanners at this facility use dose modulation, iterative reconstruction, and/or weight based d
osing when appropriate to reduce radiation dose to as low as reasonably achievable (ALARA).

[facility]: Dose Right  [facility]: CareDose    [facility]: Dose Right    [facility]: Teradose 4D    [facility]: Smart [employer]



RADIATION DOSE:  CT Rad equipment meets quality standard of care and radiation dose reduction techniq
ues were employed. CTDIvol: 13.7 - 16.1 mGy. DLP: 1685 mGy-cm. .



LIMITATIONS:  None.



FINDINGS:  AXILLAE: No adenopathy.

CHEST WALL: No masses.  No subcutaneous air.

LUNGS: Dense consolidation in the right upper lobe.  Underlying nodules cannot be excluded.  Stable a
pproximately 3.9 mm nodule in the periphery of the right lower lobe best demonstrated on series 4, im
age 42.  Minimal right basilar atelectasis.

PLEURA: Small right pleural effusion.

THYROID: No masses or significant asymmetry.

HILAR AND MEDIASTINAL STRUCTURES: 3.5 x 2.2 cm precarinal lymph node.  Previously this measured appro
ximately 2.0 x 1.1 cm.  There is sub- carinal adenopathy.  Possible right hilar mass and/or adenopath
y.  This is best demonstrated on series 3, image 35.  This measures 2.7 cm in diameter.

AORTA AND GREAT VESSELS: No aneurysm.  No dissection.

PULMONARY ARTERIES: No identified pulmonary emboli.  Study not optimized for the pulmonary arteries.

HEART: No pericardial effusion.

HARDWARE AND LIFELINES: Battery pack and leads are in place.

BONES: No significant finding.

OTHER: No other significant finding.



IMPRESSION:  Probable right central hilar mass with postobstructive pneumonia.  Recommend bronchoscop
y for further evaluation.  There is pathologic precarinal and subcarinal adenopathy.  There is a smal
l right pleural effusion.  Minimal right basilar atelectasis.  Stable 3.9 mm nodule in the periphery 
of the right lower lobe.



COMPARISON:  None.



RADIATION DOSE:  CT Rad equipment meets quality standard of care and radiation dose reduction techniq
ues were employed. CTDIvol: 13.7 - 16.1 mGy. DLP: 1685 mGy-cm. mGy.



TECHNIQUE:  CT scan of the abdomen and pelvis performed with intravenous and oral contrast using anthony
naima scanning technique with dynamic intravenous contrast injection.  Images reviewed with lung, soft 
tissue and bone windows.  Reconstructed coronal and sagittal MPR images reviewed.  Delayed images for
 evaluation of the urinary system also acquired and evaluated. All images stored on PACS.

All CT scanners at this facility use dose modulation, iterative reconstruction, and/or weight based d
osing when appropriate to reduce radiation dose to as low as reasonably achievable (ALARA).

[facility]: Dose Right  [facility]: SureCare    [facility]: Dose Right    [facility]: Teradose 4D    [facility]: Smart Technologies



FINDINGS:  LIVER: Normal size. No masses.  No dilated ducts.

SPLEEN: Normal size.  No focal lesions.

PANCREAS: No masses.  No significant calcifications.  No adjacent inflammation or peripancreatic flui
d collections.  Pancreatic duct not dilated.

GALLBLADDER: No identified stones by CT criteria. No inflammatory changes to suggest cholecystitis.

ADRENAL GLANDS: No significant masses or asymmetry.

RIGHT KIDNEY AND URETER: Small right renal cyst.  No solid lesions.   No significant calcifications. 
  Dilatation of the right renal pelvis no stones.  There is dilatation of the proximal right ureter d
istal ureter is decompressed.  This may represent chronic dilatation.  Delayed images demonstrate nor
mal excretion.

LEFT KIDNEY AND URETER: No solid masses.   No significant calcifications.   No hydronephrosis or hydr
oureter.

AORTA AND VESSELS: No aneurysm. No dissection. Renal arteries, SMA, celiac without stenosis.

RETROPERITONEUM: No retroperitoneal adenopathy, hemorrhage or masses.

LARGE AND SMALL BOWEL: No dilatation.  No masses.  No wall thickening.

APPENDIX: The appendix is not identified.  There are surgical clips in the right lower quadrant but n
o history of appendectomy.

ABDOMINAL WALL: No hernia or masses.

PERITONEAL CAVITY: No free air.  No free fluid.  No peritoneal implants or masses.

PELVIS: No mass or free fluid.  Normal bladder.

BONES: Degenerative changes throughout the lumbar spine.  Postsurgical changes as well.  No evidence 
of metastatic disease.

OTHER: No other significant finding.



IMPRESSION:  Dilatation of the right collecting system possibly chronic.  No stones are identified.  
No obstructing lesions.  No evidence of metastatic disease in the abdomen or pelvis.

Degenerative changes and postsurgical changes in the lumbar spine.



TECHNICAL DOCUMENTATION:  JOB ID:  [id number]

Quality ID # 436: Final reports with documentation of one or more dose reduction techniques (e.g., Au
tomated exposure control, adjustment of the mA and/or kV according to patient size, use of iterative 
reconstruction technique)

 2011 [employer]- All Rights Reserved



Reading location - IP/workstation name: [email or web address]

## 2020-05-20 NOTE — PDOC CRITICAL CARE PROG REPORT
General


Date:: 05/20/20


ICU Day:: 3


Hospital Day:: 3


Resuscitation Status: Full Code


Events in the past 12 to 24 Hours:: 


This 74-year-old  female reformed smoker presented to [facility] emergency department on 5/18/2020 with generalized weakness, associated

with decreased appetite, recurrent emesis and shortness of breath.  She reported

recent treatment for "pneumonia" and "persistent pneumonia" with 210-day courses

of amoxicillin.  She also reported having nausea and emesis during this time 

(preceding antibiotic therapy).  In the emergency department, she was found to 

have sodium 111 with an elevated lipase.  She was admitted to the ICU for 

treatment with 3% saline for severe hyponatremia.





5/19: Sodium 116, rising at an optimal rate of 0.5 mEq/h.  Nausea controlled 

with Zofran.  Tolerating p.o.  Still on 3% saline at 30 mL/h.  The patient is 

undergoing treatment for hypotonic, euvolemic hyponatremia.  The patient does 

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  On 

physical exam, the patient is noted to have a malar rash and diffuse 

maculopapular rash involving her whole body (neck, torso, extensor surfaces of 

limbs).  She denies any history of lupus.





5/20: Sodium 125.  3% saline infusion was decreased to 20 mL/h overnight but has

subsequently been increased to 40 mL/h.  Today, her malar rash and diffuse 

maculopapular rash are significantly less noticeable (without definitive 

therapy).  CT abdomen/chest with contrast was done this morning and reveals a 

right hilar mass with postobstructive pneumonia with hilar, precarinal and 

subcarinal lymphadenopathy.  There is an associated right pleural effusion 

(tiny).  Additionally, there is a 4 cm right lower lobe nodule.  Abdominal CT 

did incidentally find a right renal cyst and dilatation of the right renal 

pelvis and the right proximal ureter without evidence of stones.


Review of systems relevant to events:: 


Gastrointestinal: Nausea/emesis.


Renal/metabolic: Hyponatremia


Cutaneous: Rash


Pulmonary: Abnormal chest x-ray


Reason for ICU Addmission:: severe hyponatremia





- Medications:


Medications reviewed and adjusted accordingly: Yes





Physical Exam


Vital Signs: 


                                        











Temp Pulse Resp BP Pulse Ox


 


 99.1 F   60   15   182/63 H  95 


 


 05/20/20 10:00  05/19/20 19:48  05/20/20 10:00  05/20/20 09:41  05/20/20 10:00








                                 Intake & Output











 05/19/20 05/20/20 05/21/20





 06:59 06:59 06:59


 


Intake Total 1397 1250 300


 


Output Total 1025 1140 440


 


Balance 372 110 -140


 


Weight 71 kg 72.7 kg 








                                  Weight/Height





Weight                           72.7 kg


Height                           1.7 m








General appearance: PRESENT: no acute distress, well-developed, well-nourished


Head exam: PRESENT: atraumatic, normocephalic


Eye exam: PRESENT: conjunctiva pink, EOMI, PERRLA.  ABSENT: scleral icterus


Mouth exam: PRESENT: dry mucosa


Neck exam: PRESENT: carotid bruit


Respiratory exam: PRESENT: clear to auscultation sancho.  ABSENT: rales, rhonchi, 

wheezes


Cardiovascular exam: PRESENT: RRR.  ABSENT: diastolic murmur, rubs, systolic 

murmur


GI/Abdominal exam: PRESENT: normal bowel sounds, soft.  ABSENT: distended, 

guarding, mass, organolmegaly, rebound, tenderness


Extremities exam: PRESENT: full ROM.  ABSENT: calf tenderness, clubbing, pedal 

edema


Musculoskeletal exam: PRESENT: normal inspection.  ABSENT: deformity


Neurological exam: PRESENT: alert, awake, oriented to person, oriented to place,

oriented to time, oriented to situation, CN II-XII grossly intact.  ABSENT: 

motor sensory deficit


Psychiatric exam: PRESENT: appropriate affect, normal mood.  ABSENT: homicidal 

ideation, suicidal ideation


Skin exam: PRESENT: other - Malar rash and diffuse maculopapular rash are both 

improved today





Laboratory/Radiographs


Laboratory Results: 


                                        





                                 05/20/20 05:10 





                                 05/20/20 10:00 





                                        











  05/19/20 05/19/20 05/19/20





  12:53 12:58 14:05


 


WBC   


 


RBC   


 


Hgb   


 


Hct   


 


MCV   


 


MCH   


 


MCHC   


 


RDW   


 


Plt Count   


 


Seg Neutrophils %   


 


Retic Count (auto)   2.33 


 


Sodium  118.0 L*  


 


Potassium  3.9  


 


Chloride  86 L  


 


Carbon Dioxide  23  


 


Anion Gap  9  


 


BUN  9  


 


Creatinine  0.58  


 


Est GFR ( Amer)  > 60  


 


Glucose  99  


 


Calcium  8.4   8.5


 


Ionized Calcium Mae   


 


Phosphorus   


 


Magnesium   


 


Total Bilirubin   


 


AST   


 


Alkaline Phosphatase   


 


C-Reactive Protein    < 5.0


 


Total Protein   


 


Albumin   














  05/19/20 05/19/20 05/19/20





  14:05 15:59 22:19


 


WBC   


 


RBC   


 


Hgb   


 


Hct   


 


MCV   


 


MCH   


 


MCHC   


 


RDW   


 


Plt Count   


 


Seg Neutrophils %   


 


Retic Count (auto)   


 


Sodium   119.1 L*  124.7 L


 


Potassium   4.2  5.3 H D


 


Chloride   87 L  96 L


 


Carbon Dioxide   25  20 L


 


Anion Gap   7  9


 


BUN   8  8


 


Creatinine   0.62  0.59


 


Est GFR ( Amer)   > 60  > 60


 


Glucose   90  88


 


Calcium   8.4  8.7


 


Ionized Calcium Mae  1.11 L  


 


Phosphorus   


 


Magnesium   


 


Total Bilirubin   


 


AST   


 


Alkaline Phosphatase   


 


C-Reactive Protein   


 


Total Protein   


 


Albumin   














  05/20/20 05/20/20 05/20/20





  05:10 05:10 10:00


 


WBC  5.1  


 


RBC  3.84  


 


Hgb  12.4  


 


Hct  34.7 L  


 


MCV  90  


 


MCH  32.4  


 


MCHC  35.9  


 


RDW  13.1  


 


Plt Count  155  


 


Seg Neutrophils %  74.2  


 


Retic Count (auto)   


 


Sodium   124.3 L  125.4 L


 


Potassium   3.8  D  3.9


 


Chloride   91 L  93 L


 


Carbon Dioxide   26  24


 


Anion Gap   7  8


 


BUN   7  6 L


 


Creatinine   0.70  0.60


 


Est GFR ( Amer)   > 60  > 60


 


Glucose   90  116 H


 


Calcium   8.7  8.8


 


Ionized Calcium Mae   


 


Phosphorus   2.0 L 


 


Magnesium   1.6 


 


Total Bilirubin   0.5 


 


AST   37 H 


 


Alkaline Phosphatase   35 L 


 


C-Reactive Protein   


 


Total Protein   5.7 L 


 


Albumin   3.5 








                                        











  05/18/20 05/18/20 05/19/20





  14:15 14:15 14:05


 


Creatine Kinase    48


 


Troponin I   0.049 


 


NT-Pro-B Natriuret Pep  247  











Impressions: 


                                        





Acute Abdomen Series  05/18/20 15:06


IMPRESSION:  Constipation.  No obstruction.


 








Abdomen/Pelvis CT  05/20/20 00:00


IMPRESSION:  Probable right central hilar mass with postobstructive pneumonia.  

Recommend bronchoscopy for further evaluation.  There is pathologic precarinal 

and subcarinal adenopathy.  There is a small right pleural effusion.  Minimal 

right basilar atelectasis.  Stable 3.9 mm nodule in the periphery of the right 

lower lobe.


 


IMPRESSION:  Dilatation of the right collecting system possibly chronic.  No 

stones are identified.  No obstructing lesions.  No evidence of metastatic 

disease in the abdomen or pelvis.


Degenerative changes and postsurgical changes in the lumbar spine.


 








Chest CT  05/20/20 00:00


IMPRESSION:  Probable right central hilar mass with postobstructive pneumonia.  

Recommend bronchoscopy for further evaluation.  There is pathologic precarinal 

and subcarinal adenopathy.  There is a small right pleural effusion.  Minimal 

right basilar atelectasis.  Stable 3.9 mm nodule in the periphery of the right 

lower lobe.


 


IMPRESSION:  Dilatation of the right collecting system possibly chronic.  No 

stones are identified.  No obstructing lesions.  No evidence of metastatic 

disease in the abdomen or pelvis.


Degenerative changes and postsurgical changes in the lumbar spine.


 











All labs, radiographs, diagnostic studies and EKGs were personally reviewed: Yes


In addition, reports of radiographic and diagnostic studies were read: Yes





Assessment and Plan





- Diagnosis


(1) Pleural effusion, right


Is this a current diagnosis for this admission?: Yes   


Plan: 


Too small to tap.


Continue to monitor.








(2) Right lower lobe lung mass


Is this a current diagnosis for this admission?: Yes   


Plan: 


Based on the patient's strong family history for lung cancer and personal 

history of smoking, this is highly suspicious for malignancy, and it may be the 

etiology of her SIADH.


After correction of her hyponatremia, the patient needs to undergo diagnostic 

evaluation.








(3) Lymphadenopathy, hilar


Is this a current diagnosis for this admission?: Yes   





(4) Lymphadenopathy, mediastinal


Is this a current diagnosis for this admission?: Yes   





(5) Postobstructive pneumonia


Is this a current diagnosis for this admission?: Yes   


Plan: 


Treated with 210-day courses of amoxicillin.  Currently, patient is afebrile and

has no sputum production.  She is on room air without hypoxia.








(6) Abnormal chest x-ray


Is this a current diagnosis for this admission?: Yes   





(7) Maculopapular rash, generalized


Is this a current diagnosis for this admission?: Yes   


Plan: 


Check SSA, SSB, anti-Mariann 1 RNP, creatinine kinase, aldolase.


Punch biopsy may be warranted.








(8) Malar rash


Is this a current diagnosis for this admission?: Yes   


Plan: 


Check ROSEANNE, anti-double-stranded DNA, antiphospholipid's, complement panel.


ESR 5.  CRP < 5.








(9) Hyponatremia with decreased serum osmolality


Is this a current diagnosis for this admission?: Yes   





(10) Elevated lipase


Is this a current diagnosis for this admission?: Yes   





(11) Leukopenia


Is this a current diagnosis for this admission?: Yes   


Plan: 


Resolved.








(12) Abnormal LFTs


Is this a current diagnosis for this admission?: Yes   





(13) Chronic combined systolic and diastolic CHF (congestive heart failure)


Is this a current diagnosis for this admission?: Yes   





(14) Hypothyroidism


Qualifiers: 


   Hypothyroidism type: unspecified   Qualified Code(s): E03.9 - Hypothyroidism,

unspecified   


Is this a current diagnosis for this admission?: Yes   


Plan: 


Continue Synthroid








(15) Hypertension


Qualifiers: 


   Hypertension type: essential hypertension   Qualified Code(s): I10 - 

Essential (primary) hypertension   


Is this a current diagnosis for this admission?: Yes   





(16) Thrombocytopenia


Is this a current diagnosis for this admission?: Yes   


Plan: 


Improving.  Monitor CBC.





Plan Summary: 


Per patient request, tanika (Fernanda Andersen, [phone]) updated.





Critical Time


Critical Time (minutes): 90


Level of Care: ICU


-: 


1.  The care of a critical patient is a dynamic process.  This note is a 

representative synopsis but static in nature.  The timeframe for treatments 

given in order is not necessarily the actual time these treatments may have been

done.





2.  This patient requires critical care secondary to ongoing requirements for 

therapy not offered or safe outside the critical care environment.  Transfer to 

a lower level of care will result in altered life or limb morbidity and 

mortality.





3.  Multidisciplinary rounds completed.





4.  ABCDE bundle addressed.

## 2020-05-21 LAB
ANION GAP SERPL CALC-SCNC: 10 MMOL/L (ref 5–19)
ANION GAP SERPL CALC-SCNC: 6 MMOL/L (ref 5–19)
ANION GAP SERPL CALC-SCNC: 7 MMOL/L (ref 5–19)
ANION GAP SERPL CALC-SCNC: 7 MMOL/L (ref 5–19)
B19V IGG SER IA-ACNC: 0.3 INDEX (ref 0–0.8)
B19V IGM SER IA-ACNC: 0.2 INDEX (ref 0–0.8)
BUN SERPL-MCNC: 8 MG/DL (ref 7–20)
BUN SERPL-MCNC: 8 MG/DL (ref 7–20)
BUN SERPL-MCNC: 9 MG/DL (ref 7–20)
BUN SERPL-MCNC: 9 MG/DL (ref 7–20)
CALCIUM: 8.9 MG/DL (ref 8.4–10.2)
CALCIUM: 9.2 MG/DL (ref 8.4–10.2)
CALCIUM: 9.5 MG/DL (ref 8.4–10.2)
CALCIUM: 9.7 MG/DL (ref 8.4–10.2)
CHLORIDE SERPL-SCNC: 100 MMOL/L (ref 98–107)
CHLORIDE SERPL-SCNC: 101 MMOL/L (ref 98–107)
CHLORIDE SERPL-SCNC: 102 MMOL/L (ref 98–107)
CHLORIDE SERPL-SCNC: 94 MMOL/L (ref 98–107)
CO2 SERPL-SCNC: 25 MMOL/L (ref 22–30)
CO2 SERPL-SCNC: 25 MMOL/L (ref 22–30)
CO2 SERPL-SCNC: 28 MMOL/L (ref 22–30)
CO2 SERPL-SCNC: 29 MMOL/L (ref 22–30)
GLUCOSE SERPL-MCNC: 114 MG/DL (ref 75–110)
GLUCOSE SERPL-MCNC: 138 MG/DL (ref 75–110)
GLUCOSE SERPL-MCNC: 141 MG/DL (ref 75–110)
GLUCOSE SERPL-MCNC: 152 MG/DL (ref 75–110)
POTASSIUM SERPL-SCNC: 3.4 MMOL/L (ref 3.6–5)
POTASSIUM SERPL-SCNC: 3.5 MMOL/L (ref 3.6–5)
POTASSIUM SERPL-SCNC: 3.7 MMOL/L (ref 3.6–5)
POTASSIUM SERPL-SCNC: 3.9 MMOL/L (ref 3.6–5)

## 2020-05-21 RX ADMIN — CARVEDILOL SCH MG: 12.5 TABLET, FILM COATED ORAL at 21:40

## 2020-05-21 RX ADMIN — Medication SCH: at 14:04

## 2020-05-21 RX ADMIN — SODIUM CHLORIDE PRN MLS/HR: 3 INJECTION, SOLUTION INTRAVENOUS at 08:12

## 2020-05-21 RX ADMIN — MAGNESIUM SULFATE IN DEXTROSE SCH MLS/HR: 10 INJECTION, SOLUTION INTRAVENOUS at 18:03

## 2020-05-21 RX ADMIN — SIMVASTATIN SCH MG: 40 TABLET, FILM COATED ORAL at 17:18

## 2020-05-21 RX ADMIN — SODIUM CHLORIDE PRN MLS/HR: 3 INJECTION, SOLUTION INTRAVENOUS at 23:29

## 2020-05-21 RX ADMIN — TEMAZEPAM SCH MG: 15 CAPSULE ORAL at 21:38

## 2020-05-21 RX ADMIN — OXYCODONE HYDROCHLORIDE PRN MG: 5 TABLET ORAL at 23:33

## 2020-05-21 RX ADMIN — LIDOCAINE SCH PATCH: 50 PATCH CUTANEOUS at 11:00

## 2020-05-21 RX ADMIN — METHOCARBAMOL SCH MG: 500 TABLET ORAL at 21:38

## 2020-05-21 RX ADMIN — FAMOTIDINE SCH MG: 10 INJECTION INTRAVENOUS at 21:39

## 2020-05-21 RX ADMIN — Medication SCH: at 06:10

## 2020-05-21 RX ADMIN — OXYCODONE HYDROCHLORIDE PRN MG: 5 TABLET ORAL at 17:21

## 2020-05-21 RX ADMIN — Medication SCH: at 21:41

## 2020-05-21 RX ADMIN — MAGNESIUM SULFATE IN DEXTROSE SCH MLS/HR: 10 INJECTION, SOLUTION INTRAVENOUS at 19:03

## 2020-05-21 RX ADMIN — BENAZEPRIL HYDROCHLORIDE SCH MG: 5 TABLET ORAL at 21:41

## 2020-05-21 RX ADMIN — METHOCARBAMOL SCH MG: 500 TABLET ORAL at 09:18

## 2020-05-21 RX ADMIN — MAGNESIUM SULFATE IN DEXTROSE SCH: 10 INJECTION, SOLUTION INTRAVENOUS at 16:29

## 2020-05-21 RX ADMIN — HEPARIN SODIUM SCH UNIT: 5000 INJECTION, SOLUTION INTRAVENOUS; SUBCUTANEOUS at 21:39

## 2020-05-21 RX ADMIN — OXYCODONE HYDROCHLORIDE SCH MG: 10 TABLET, FILM COATED, EXTENDED RELEASE ORAL at 21:38

## 2020-05-21 RX ADMIN — FENOFIBRATE SCH MG: 145 TABLET ORAL at 09:17

## 2020-05-21 RX ADMIN — PANTOPRAZOLE SODIUM SCH MG: 40 INJECTION, POWDER, FOR SOLUTION INTRAVENOUS at 09:21

## 2020-05-21 RX ADMIN — HEPARIN SODIUM SCH UNIT: 5000 INJECTION, SOLUTION INTRAVENOUS; SUBCUTANEOUS at 14:02

## 2020-05-21 RX ADMIN — PREGABALIN SCH MG: 75 CAPSULE ORAL at 09:19

## 2020-05-21 RX ADMIN — ASPIRIN SCH MG: 81 TABLET, COATED ORAL at 09:18

## 2020-05-21 RX ADMIN — CARVEDILOL SCH MG: 12.5 TABLET, FILM COATED ORAL at 09:18

## 2020-05-21 RX ADMIN — MAGNESIUM SULFATE IN DEXTROSE SCH MLS/HR: 10 INJECTION, SOLUTION INTRAVENOUS at 16:58

## 2020-05-21 RX ADMIN — METHOCARBAMOL SCH MG: 500 TABLET ORAL at 17:18

## 2020-05-21 RX ADMIN — LEVOTHYROXINE SODIUM SCH MG: 25 TABLET ORAL at 08:13

## 2020-05-21 RX ADMIN — NITROGLYCERIN SCH EACH: 0.4 PATCH TRANSDERMAL at 09:12

## 2020-05-21 RX ADMIN — HEPARIN SODIUM SCH UNIT: 5000 INJECTION, SOLUTION INTRAVENOUS; SUBCUTANEOUS at 06:16

## 2020-05-21 RX ADMIN — OXYCODONE HYDROCHLORIDE SCH MG: 10 TABLET, FILM COATED, EXTENDED RELEASE ORAL at 09:16

## 2020-05-21 RX ADMIN — METHYLPREDNISOLONE SODIUM SUCCINATE SCH MG: 40 INJECTION, POWDER, FOR SOLUTION INTRAMUSCULAR; INTRAVENOUS at 14:03

## 2020-05-21 RX ADMIN — METHOCARBAMOL SCH MG: 500 TABLET ORAL at 13:59

## 2020-05-21 NOTE — PDOC CRITICAL CARE PROG REPORT
General


Date:: 05/21/20


ICU Day:: 4


Hospital Day:: 4


Resuscitation Status: Full Code


Events in the past 12 to 24 Hours:: 


This 74-year-old  female reformed smoker presented to [facility] emergency department on 5/18/2020 with generalized weakness, associated

with decreased appetite, recurrent emesis and shortness of breath.  She reported

recent treatment for "pneumonia" and "persistent pneumonia" with 210-day courses

of amoxicillin.  She also reported having nausea and emesis during this time 

(preceding antibiotic therapy).  In the emergency department, she was found to 

have sodium 111 with an elevated lipase.  She was admitted to the ICU for 

treatment with 3% saline for severe hyponatremia.





5/19: Sodium 116, rising at an optimal rate of 0.5 mEq/h.  Nausea controlled 

with Zofran.  Tolerating p.o.  Still on 3% saline at 30 mL/h.  The patient is 

undergoing treatment for hypotonic, euvolemic hyponatremia.  The patient does 

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  On 

physical exam, the patient is noted to have a malar rash and diffuse 

maculopapular rash involving her whole body (neck, torso, extensor surfaces of 

limbs).  She denies any history of lupus.





5/20: Sodium 125.  3% saline infusion was decreased to 20 mL/h overnight but has

subsequently been increased to 40 mL/h.  Today, her malar rash and diffuse 

maculopapular rash are significantly less noticeable (without definitive 

therapy).  CT abdomen/chest with contrast was done this morning and reveals a 

right hilar mass with postobstructive pneumonia with hilar, precarinal and 

subcarinal lymphadenopathy.  There is an associated right pleural effusion 

(tiny).  Additionally, there is a 4 cm right lower lobe nodule.  Abdominal CT 

did incidentally find a right renal cyst and dilatation of the right renal 

pelvis and the right proximal ureter without evidence of stones.





5/21: Sodium up to 132 today.  Still on 3% saline at 40 mL/h.  Malar rash and 

diffuse maculopapular rash were visibly improved yesterday (perhaps after a dose

of steroids).  Today, the skin eruptions have returned (no subsequent steroid 

dosing).  I have discussed with the patient that she will likely need a 

endobronchial ultrasound-guided transbronchial lung lymph node biopsy, which is 

apparently not available at this institution.  In the interim, ROSEANNE was positive,

SSA positive, low C3.  Double-stranded DNA antibody negative.


Review of systems relevant to events:: 


Gastrointestinal: Nausea/emesis.


Renal/metabolic: Hyponatremia


Cutaneous: Rash


Pulmonary: Abnormal chest x-ray


Reason for ICU Addmission:: severe hyponatremia





- Medications:


Medications reviewed and adjusted accordingly: Yes





Physical Exam


Vital Signs: 


                                        











Temp Pulse Resp BP Pulse Ox


 


 99.1 F   80   18   170/76 H  96 


 


 05/21/20 10:27  05/21/20 10:00  05/21/20 10:27  05/21/20 10:27  05/21/20 10:27








                                 Intake & Output











 05/20/20 05/21/20 05/22/20





 06:59 06:59 06:59


 


Intake Total 1250 690 


 


Output Total 1140 1310 281


 


Balance 110 -620 -281


 


Weight 72.7 kg 71.1 kg 








                                  Weight/Height





Weight                           71.1 kg


Height                           1.7 m








Head exam: PRESENT: atraumatic, normocephalic


Eye exam: PRESENT: conjunctiva pink, EOMI, PERRLA.  ABSENT: scleral icterus


Mouth exam: PRESENT: moist, tongue midline


Respiratory exam: PRESENT: clear to auscultation sancho.  ABSENT: rales, rhonchi, 

wheezes


Cardiovascular exam: PRESENT: RRR.  ABSENT: diastolic murmur, rubs, systolic 

murmur


Pulses: PRESENT: normal dorsalis pedis pul


GI/Abdominal exam: PRESENT: normal bowel sounds, soft.  ABSENT: distended, 

guarding, mass, organolmegaly, rebound, tenderness


Extremities exam: PRESENT: full ROM, pedal edema.  ABSENT: calf tenderness, 

clubbing


Neurological exam: PRESENT: alert, awake, oriented to person, oriented to place,

oriented to time, oriented to situation, CN II-XII grossly intact.  ABSENT: 

motor sensory deficit


Psychiatric exam: PRESENT: appropriate affect, normal mood.  ABSENT: homicidal 

ideation, suicidal ideation


Skin exam: PRESENT: dry, intact, rash - Malar rash and diffuse maculopapular 

rash have returned.  Patchy purpura involving the left distal toes., warm.  

ABSENT: cyanosis





Laboratory/Radiographs


Laboratory Results: 


                                        





                                 05/20/20 05:10 





                                 05/21/20 10:20 





                                        











  05/20/20 05/20/20 05/21/20





  16:00 22:25 04:12


 


Sodium  127.4 L  129.2 L  132.6 L


 


Potassium  3.7  3.8  3.4 L


 


Chloride  95 L  95 L  102


 


Carbon Dioxide  25  26  25


 


Anion Gap  7  8  6


 


BUN  6 L  9  8


 


Creatinine  0.59  0.70  0.63


 


Est GFR ( Amer)  > 60  > 60  > 60


 


Glucose  157 H  132 H  114 H


 


Calcium  8.8  8.9  8.9


 


Magnesium   1.5 L 














  05/21/20





  10:20


 


Sodium  132.7 L


 


Potassium  3.5 L


 


Chloride  101


 


Carbon Dioxide  25


 


Anion Gap  7


 


BUN  8


 


Creatinine  0.67


 


Est GFR (African Amer)  > 60


 


Glucose  152 H


 


Calcium  9.2


 


Magnesium 








                                        











  05/18/20 05/18/20 05/19/20





  14:15 14:15 14:05


 


Creatine Kinase    48


 


Troponin I   0.049 


 


NT-Pro-B Natriuret Pep  247  











Impressions: 


                                        





Acute Abdomen Series  05/18/20 15:06


IMPRESSION:  Constipation.  No obstruction.


 








Abdomen/Pelvis CT  05/20/20 00:00


IMPRESSION:  Probable right central hilar mass with postobstructive pneumonia.  

Recommend bronchoscopy for further evaluation.  There is pathologic precarinal 

and subcarinal adenopathy.  There is a small right pleural effusion.  Minimal 

right basilar atelectasis.  Stable 3.9 mm nodule in the periphery of the right 

lower lobe.


 


IMPRESSION:  Dilatation of the right collecting system possibly chronic.  No 

stones are identified.  No obstructing lesions.  No evidence of metastatic 

disease in the abdomen or pelvis.


Degenerative changes and postsurgical changes in the lumbar spine.


 








Chest CT  05/20/20 00:00


IMPRESSION:  Probable right central hilar mass with postobstructive pneumonia.  

Recommend bronchoscopy for further evaluation.  There is pathologic precarinal 

and subcarinal adenopathy.  There is a small right pleural effusion.  Minimal 

right basilar atelectasis.  Stable 3.9 mm nodule in the periphery of the right 

lower lobe.


 


IMPRESSION:  Dilatation of the right collecting system possibly chronic.  No s

tones are identified.  No obstructing lesions.  No evidence of metastatic 

disease in the abdomen or pelvis.


Degenerative changes and postsurgical changes in the lumbar spine.


 











All labs, radiographs, diagnostic studies and EKGs were personally reviewed: Yes


In addition, reports of radiographic and diagnostic studies were read: Yes





Assessment and Plan





- Diagnosis


(1) Pleural effusion, right


Is this a current diagnosis for this admission?: Yes   


Plan: 


Too small to tap.


Continue to monitor.








(2) Right lower lobe lung mass


Is this a current diagnosis for this admission?: Yes   


Plan: 


Based on the patient's strong family history for lung cancer and personal 

history of smoking, this is highly suspicious for malignancy, and it may be the 

etiology of her SIADH.


After correction of her hyponatremia, the patient needs to undergo diagnostic 

evaluation.


While her initial radiographic findings certainly raise concern for stage IV 

disease, the patient has been found to be ROSEANNE positive, SSA positive with low 

complement.  Systemic lupus erythematosus and Sjogren's syndrome may need to be 

considered and may be contributing to her radiographic abnormalities.  While I 

still maintain concern for lung malignancy, patient now has yet another reason 

to undergo invasive diagnostic testing (fiberoptic bronchoscopy with 

transbronchial lung biopsies, endobronchial ultrasound-guided transbronchial 

lymph node biopsies, brushings for cytology and possible endobronchial 

biopsies).








(3) Lymphadenopathy, hilar


Is this a current diagnosis for this admission?: Yes   





(4) Lymphadenopathy, mediastinal


Is this a current diagnosis for this admission?: Yes   





(5) Postobstructive pneumonia


Is this a current diagnosis for this admission?: Yes   





(6) Abnormal chest x-ray


Is this a current diagnosis for this admission?: Yes   





(7) Maculopapular rash, generalized


Is this a current diagnosis for this admission?: Yes   


Plan: 


ROSEANNE positive.  SSA positive.  SSB negative.


Appears to be steroid responsive, even with a single dose. 


Solu-Medrol 60 mg IV single dose today.








(8) Malar rash


Is this a current diagnosis for this admission?: Yes   


Plan: 


ROSEANNE positive, anti-double-stranded DNA negative, low C3, normal C4, normal CH50


ESR 5.  CRP < 5.








(9) Hyponatremia with decreased serum osmolality


Is this a current diagnosis for this admission?: Yes   


Plan: 


Furosemide 20 mg IV single dose.


Continue 3% saline until sodium 136+.








(10) Elevated lipase


Is this a current diagnosis for this admission?: Yes   


Plan: 


Downtrending.  Repeat lipase in a.m.


The patient does have hypertriglyceridemia, possibly explaining her elevated 

lipase/acute pancreatitis.  Continue TriCor (fenofibrate) 145 mg p.o. daily.


Notably, the patient recently did have 20 days of treatment with amoxicillin, 

possibly contributing to an elevated lipase.








(11) Leukopenia


Is this a current diagnosis for this admission?: Yes   





(12) Abnormal LFTs


Is this a current diagnosis for this admission?: Yes   





(13) Chronic combined systolic and diastolic CHF (congestive heart failure)


Is this a current diagnosis for this admission?: Yes   





(14) Hypothyroidism


Qualifiers: 


   Hypothyroidism type: unspecified   Qualified Code(s): E03.9 - Hypothyroidism,

unspecified   


Is this a current diagnosis for this admission?: Yes   


Plan: 


Continue Synthroid








(15) Hypertension


Qualifiers: 


   Hypertension type: essential hypertension   Qualified Code(s): I10 - 

Essential (primary) hypertension   


Is this a current diagnosis for this admission?: Yes   





(16) Thrombocytopenia


Is this a current diagnosis for this admission?: Yes   





(17) Hypomagnesemia


Is this a current diagnosis for this admission?: Yes   


Plan: 


Replete.


Change Protonix to Pepcid.








Critical Time


Critical Time (minutes): 60


Level of Care: ICU


-: 


1.  The care of a critical patient is a dynamic process.  This note is a 

representative synopsis but static in nature.  The timeframe for treatments 

given in order is not necessarily the actual time these treatments may have been

done.





2.  This patient requires critical care secondary to ongoing requirements for 

therapy not offered or safe outside the critical care environment.  Transfer to 

a lower level of care will result in altered life or limb morbidity and 

mortality.





3.  Multidisciplinary rounds completed.





4.  ABCDE bundle addressed.

## 2020-05-22 LAB
ANION GAP SERPL CALC-SCNC: 10 MMOL/L (ref 5–19)
ANION GAP SERPL CALC-SCNC: 10 MMOL/L (ref 5–19)
ANION GAP SERPL CALC-SCNC: 8 MMOL/L (ref 5–19)
ANION GAP SERPL CALC-SCNC: 9 MMOL/L (ref 5–19)
BUN SERPL-MCNC: 10 MG/DL (ref 7–20)
BUN SERPL-MCNC: 10 MG/DL (ref 7–20)
BUN SERPL-MCNC: 13 MG/DL (ref 7–20)
BUN SERPL-MCNC: 15 MG/DL (ref 7–20)
CALCIUM: 9.4 MG/DL (ref 8.4–10.2)
CALCIUM: 9.5 MG/DL (ref 8.4–10.2)
CALCIUM: 9.6 MG/DL (ref 8.4–10.2)
CALCIUM: 9.7 MG/DL (ref 8.4–10.2)
CHLORIDE SERPL-SCNC: 94 MMOL/L (ref 98–107)
CHLORIDE SERPL-SCNC: 96 MMOL/L (ref 98–107)
CHLORIDE SERPL-SCNC: 97 MMOL/L (ref 98–107)
CHLORIDE SERPL-SCNC: 97 MMOL/L (ref 98–107)
CO2 SERPL-SCNC: 26 MMOL/L (ref 22–30)
CO2 SERPL-SCNC: 28 MMOL/L (ref 22–30)
CO2 SERPL-SCNC: 30 MMOL/L (ref 22–30)
CO2 SERPL-SCNC: 31 MMOL/L (ref 22–30)
GLUCOSE SERPL-MCNC: 116 MG/DL (ref 75–110)
GLUCOSE SERPL-MCNC: 142 MG/DL (ref 75–110)
GLUCOSE SERPL-MCNC: 151 MG/DL (ref 75–110)
GLUCOSE SERPL-MCNC: 159 MG/DL (ref 75–110)
POTASSIUM SERPL-SCNC: 3.3 MMOL/L (ref 3.6–5)
POTASSIUM SERPL-SCNC: 3.6 MMOL/L (ref 3.6–5)
POTASSIUM SERPL-SCNC: 3.7 MMOL/L (ref 3.6–5)
POTASSIUM SERPL-SCNC: 3.9 MMOL/L (ref 3.6–5)

## 2020-05-22 RX ADMIN — OXYCODONE HYDROCHLORIDE SCH MG: 10 TABLET, FILM COATED, EXTENDED RELEASE ORAL at 11:17

## 2020-05-22 RX ADMIN — OXYCODONE HYDROCHLORIDE SCH MG: 10 TABLET, FILM COATED, EXTENDED RELEASE ORAL at 22:11

## 2020-05-22 RX ADMIN — TEMAZEPAM SCH MG: 15 CAPSULE ORAL at 22:12

## 2020-05-22 RX ADMIN — HYDRALAZINE HYDROCHLORIDE PRN MG: 20 INJECTION INTRAMUSCULAR; INTRAVENOUS at 04:32

## 2020-05-22 RX ADMIN — BUDESONIDE SCH MG: 0.25 SUSPENSION RESPIRATORY (INHALATION) at 21:08

## 2020-05-22 RX ADMIN — Medication SCH: at 06:32

## 2020-05-22 RX ADMIN — METHYLPREDNISOLONE SODIUM SUCCINATE SCH MG: 40 INJECTION, POWDER, FOR SOLUTION INTRAMUSCULAR; INTRAVENOUS at 11:16

## 2020-05-22 RX ADMIN — METHOCARBAMOL SCH MG: 500 TABLET ORAL at 11:17

## 2020-05-22 RX ADMIN — SODIUM CHLORIDE PRN MLS/HR: 3 INJECTION, SOLUTION INTRAVENOUS at 19:35

## 2020-05-22 RX ADMIN — FAMOTIDINE SCH MG: 10 INJECTION INTRAVENOUS at 22:12

## 2020-05-22 RX ADMIN — METHOCARBAMOL SCH MG: 500 TABLET ORAL at 14:28

## 2020-05-22 RX ADMIN — BENAZEPRIL HYDROCHLORIDE SCH MG: 5 TABLET ORAL at 22:12

## 2020-05-22 RX ADMIN — HEPARIN SODIUM SCH UNIT: 5000 INJECTION, SOLUTION INTRAVENOUS; SUBCUTANEOUS at 22:12

## 2020-05-22 RX ADMIN — SODIUM CHLORIDE PRN MLS/HR: 3 INJECTION, SOLUTION INTRAVENOUS at 04:31

## 2020-05-22 RX ADMIN — OXYCODONE HYDROCHLORIDE PRN MG: 5 TABLET ORAL at 04:29

## 2020-05-22 RX ADMIN — FAMOTIDINE SCH MG: 10 INJECTION INTRAVENOUS at 11:16

## 2020-05-22 RX ADMIN — OXYCODONE HYDROCHLORIDE PRN MG: 5 TABLET ORAL at 14:27

## 2020-05-22 RX ADMIN — PREGABALIN SCH MG: 75 CAPSULE ORAL at 11:17

## 2020-05-22 RX ADMIN — HYDRALAZINE HYDROCHLORIDE PRN MG: 20 INJECTION INTRAMUSCULAR; INTRAVENOUS at 00:50

## 2020-05-22 RX ADMIN — HEPARIN SODIUM SCH: 5000 INJECTION, SOLUTION INTRAVENOUS; SUBCUTANEOUS at 06:32

## 2020-05-22 RX ADMIN — SIMVASTATIN SCH MG: 40 TABLET, FILM COATED ORAL at 18:02

## 2020-05-22 RX ADMIN — HEPARIN SODIUM SCH UNIT: 5000 INJECTION, SOLUTION INTRAVENOUS; SUBCUTANEOUS at 14:28

## 2020-05-22 RX ADMIN — Medication SCH: at 23:15

## 2020-05-22 RX ADMIN — LEVOTHYROXINE SODIUM SCH MG: 25 TABLET ORAL at 11:17

## 2020-05-22 RX ADMIN — METHOCARBAMOL SCH MG: 500 TABLET ORAL at 18:02

## 2020-05-22 RX ADMIN — FENOFIBRATE SCH MG: 145 TABLET ORAL at 11:17

## 2020-05-22 RX ADMIN — LIDOCAINE SCH PATCH: 50 PATCH CUTANEOUS at 11:19

## 2020-05-22 RX ADMIN — IPRATROPIUM BROMIDE AND ALBUTEROL SULFATE SCH ML: 2.5; .5 SOLUTION RESPIRATORY (INHALATION) at 21:08

## 2020-05-22 RX ADMIN — METHOCARBAMOL SCH MG: 500 TABLET ORAL at 22:11

## 2020-05-22 RX ADMIN — ASPIRIN SCH MG: 81 TABLET, COATED ORAL at 11:16

## 2020-05-22 RX ADMIN — Medication SCH: at 14:19

## 2020-05-22 RX ADMIN — MORPHINE SULFATE PRN MG: 10 INJECTION INTRAMUSCULAR; INTRAVENOUS; SUBCUTANEOUS at 08:15

## 2020-05-22 RX ADMIN — MORPHINE SULFATE PRN MG: 10 INJECTION INTRAMUSCULAR; INTRAVENOUS; SUBCUTANEOUS at 14:27

## 2020-05-22 RX ADMIN — CARVEDILOL SCH MG: 12.5 TABLET, FILM COATED ORAL at 11:17

## 2020-05-22 RX ADMIN — NITROGLYCERIN SCH EACH: 0.4 PATCH TRANSDERMAL at 11:20

## 2020-05-22 RX ADMIN — CARVEDILOL SCH MG: 12.5 TABLET, FILM COATED ORAL at 22:12

## 2020-05-22 NOTE — PDOC CRITICAL CARE PROG REPORT
General


Date:: 05/22/20


ICU Day:: 5


Hospital Day:: 5


Resuscitation Status: Full Code


Events in the past 12 to 24 Hours:: 


This 74-year-old  female reformed smoker presented to [facility] emergency department on 5/18/2020 with generalized weakness, associated

with decreased appetite, recurrent emesis and shortness of breath.  She reported

recent treatment for "pneumonia" and "persistent pneumonia" with two 10-day 

courses of amoxicillin.  She also reported having nausea and emesis during this 

time (preceding antibiotic therapy).  In the emergency department, Na 111, 

elevated lipase.  Admitted to ICU for treatment with 3% saline .





5/19: Sodium 116, rising at an optimal rate of 0.5 mEq/h.  Nausea controlled 

with Zofran.  Tolerating p.o.  Still on 3% saline at 30 mL/h.  The patient is 

undergoing treatment for hypotonic, euvolemic hyponatremia.  The patient does 

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  On 

physical exam, the patient is noted to have a malar rash and diffuse maculo

papular rash involving her whole body (neck, torso, extensor surfaces of limbs).

 She denies any history of lupus.





5/20: Sodium 125.  3% saline infusion was decreased to 20 mL/h overnight but has

subsequently been increased to 40 mL/h.  Today, her malar rash and diffuse 

maculopapular rash are significantly less noticeable (without definitive 

therapy).  CT abdomen/chest with contrast was done this morning and reveals a 

right hilar mass with postobstructive pneumonia with hilar, precarinal and 

subcarinal lymphadenopathy.  There is an associated right pleural effusion 

(tiny).  Additionally, there is a 4 cm right lower lobe nodule.  Abdominal CT 

did incidentally find a right renal cyst and dilatation of the right renal 

pelvis and the right proximal ureter without evidence of stones.





5/21: Sodium up to 132 today.  Still on 3% saline at 40 mL/h.  Malar rash and 

diffuse maculopapular rash were visibly improved yesterday (perhaps after a dose

of steroids).  Today, the skin eruptions have returned (no subsequent steroid 

dosing).  I have discussed with the patient that she will likely need a 

endobronchial ultrasound-guided transbronchial lung lymph node biopsy, which is 

apparently not available at this institution.  In the interim, ROSEANNE was positive,

SSA positive, low C3.  Double-stranded DNA antibody negative.





5/22: 3% saline infusion was interrupted when sodium 135 for need of repeating 

magnesium, during which time she drifted from 135 back down to 132.  She was 

subsequently restarted on 3% saline but was also given furosemide 40 mg IV 

single dose.  She remained at 132.  Still on 3% saline at 40 mL/h.  She was 

started on Solu-Medrol 60 mg IV daily yesterday.  Malar rash and diffuse 

maculopapular rash have again visibly improved.


Review of systems relevant to events:: 


Gastrointestinal: Nausea/emesis.


Renal/metabolic: Hyponatremia


Cutaneous: Rash


Pulmonary: Abnormal chest x-ray


Reason for ICU Addmission:: severe hyponatremia





- Medications:


Medications reviewed and adjusted accordingly: Yes





Physical Exam


Vital Signs: 


                                        











Temp Pulse Resp BP Pulse Ox


 


 98.6 F   73   21 H  182/84 H  94 


 


 05/22/20 06:09  05/22/20 08:00  05/22/20 06:09  05/22/20 06:09  05/22/20 06:09








                                 Intake & Output











 05/21/20 05/22/20 05/23/20





 06:59 06:59 06:59


 


Intake Total 690 300 


 


Output Total 1310 2836 1150


 


Balance -994 -2536 -9670


 


Weight 71.1 kg 73.1 kg 








                                  Weight/Height





Weight                           73.1 kg


Height                           1.7 m








General appearance: PRESENT: no acute distress, well-developed, well-nourished


Head exam: PRESENT: atraumatic, normocephalic


Eye exam: PRESENT: conjunctiva pink, EOMI, PERRLA.  ABSENT: scleral icterus


Mouth exam: PRESENT: dry mucosa, moist, tongue midline


Neck exam: ABSENT: carotid bruit, JVD, lymphadenopathy, thyromegaly


Respiratory exam: PRESENT: crackles - Right base, symmetrical.  ABSENT: rhonchi,

wheezes


Cardiovascular exam: PRESENT: RRR.  ABSENT: diastolic murmur, rubs, systolic 

murmur


Pulses: PRESENT: normal dorsalis pedis pul


GI/Abdominal exam: PRESENT: normal bowel sounds, soft.  ABSENT: distended, 

guarding, mass, organolmegaly, rebound, tenderness


Extremities exam: PRESENT: full ROM, pedal edema.  ABSENT: calf tenderness, 

clubbing


Neurological exam: PRESENT: alert, awake, oriented to person, oriented to place,

oriented to time, oriented to situation, CN II-XII grossly intact.  ABSENT: 

motor sensory deficit


Skin exam: PRESENT: dry, intact, warm.  ABSENT: cyanosis, rash





Laboratory/Radiographs


Laboratory Results: 


                                        





                                 05/20/20 05:10 





                                 05/22/20 10:02 





                                        











  05/21/20 05/21/20 05/22/20





  15:59 22:09 04:25


 


Sodium  135.0 L  132.6 L  132.5 L


 


Potassium  3.9  3.7  3.9


 


Chloride  100  94 L  97 L


 


Carbon Dioxide  28  29  26


 


Anion Gap  7  10  10


 


BUN  9  9  10


 


Creatinine  0.72  0.66  0.57


 


Est GFR ( Amer)  > 60  > 60  > 60


 


Glucose  138 H  141 H  116 H


 


Calcium  9.5  9.7  9.5


 


Magnesium  1.5 L  2.2 














  05/22/20 05/22/20





  04:25 10:02


 


Sodium   134.4 L


 


Potassium   3.3 L


 


Chloride   94 L


 


Carbon Dioxide   31 H


 


Anion Gap   9


 


BUN   10


 


Creatinine   0.67


 


Est GFR (African Amer)   > 60


 


Glucose   142 H


 


Calcium   9.7


 


Magnesium  1.9 








                                        











  05/18/20 05/18/20 05/19/20





  14:15 14:15 14:05


 


Creatine Kinase    48


 


Troponin I   0.049 


 


NT-Pro-B Natriuret Pep  247  











Impressions: 


                                        





Acute Abdomen Series  05/18/20 15:06


IMPRESSION:  Constipation.  No obstruction.


 








Abdomen/Pelvis CT  05/20/20 00:00


IMPRESSION:  Probable right central hilar mass with postobstructive pneumonia.  

Recommend bronchoscopy for further evaluation.  There is pathologic precarinal 

and subcarinal adenopathy.  There is a small right pleural effusion.  Minimal 

right basilar atelectasis.  Stable 3.9 mm nodule in the periphery of the right 

lower lobe.


 


IMPRESSION:  Dilatation of the right collecting system possibly chronic.  No 

stones are identified.  No obstructing lesions.  No evidence of metastatic 

disease in the abdomen or pelvis.


Degenerative changes and postsurgical changes in the lumbar spine.


 








Chest CT  05/20/20 00:00


IMPRESSION:  Probable right central hilar mass with postobstructive pneumonia.  

Recommend bronchoscopy for further evaluation.  There is pathologic precarinal 

and subcarinal adenopathy.  There is a small right pleural effusion.  Minimal 

right basilar atelectasis.  Stable 3.9 mm nodule in the periphery of the right 

lower lobe.


 


IMPRESSION:  Dilatation of the right collecting system possibly chronic.  No 

stones are identified.  No obstructing lesions.  No evidence of metastatic 

disease in the abdomen or pelvis.


Degenerative changes and postsurgical changes in the lumbar spine.


 











All labs, radiographs, diagnostic studies and EKGs were personally reviewed: Yes


In addition, reports of radiographic and diagnostic studies were read: Yes





Assessment and Plan





- Diagnosis


(1) Pleural effusion, right


Is this a current diagnosis for this admission?: Yes   


Plan: 


Thus far too small to tap.  Chest x-ray in a.m.








(2) Right lower lobe lung mass


Is this a current diagnosis for this admission?: Yes   


Plan: 


Based on the patient's strong family history for lung cancer and personal hist

ory of smoking, this is highly suspicious for malignancy, and it may be the 

etiology of her SIADH.


After correction of her hyponatremia, the patient needs to undergo diagnostic 

evaluation.


While her initial radiographic findings certainly raise concern for stage IV 

disease, the patient has been found to be ROSEANNE positive, SSA positive with low 

C3, systemic lupus erythematosus and Sjogren's syndrome need to be considered 

and may be contributing to her radiographic abnormalities.  While I still main

tain concern for lung malignancy, the patient now has another reason to undergo 

invasive diagnostic testing (fiberoptic bronchoscopy with transbronchial lung 

biopsies, endobronchial ultrasound-guided transbronchial lymph node biopsies, 

brushings for cytology and possible endobronchial biopsies).


She will need transfer to a facility that we will be able to provide her a 

higher level of care, as we are unable to pursue this diagnostic evaluation at 

this facility.








(3) Lymphadenopathy, hilar


Is this a current diagnosis for this admission?: Yes   





(4) Lymphadenopathy, mediastinal


Is this a current diagnosis for this admission?: Yes   





(5) Postobstructive pneumonia


Is this a current diagnosis for this admission?: Yes   





(6) Abnormal chest x-ray


Is this a current diagnosis for this admission?: Yes   





(7) Maculopapular rash, generalized


Is this a current diagnosis for this admission?: Yes   


Plan: 


ROSEANNE positive.  SSA positive.  SSB negative.


Appears to be steroid responsive, even with a single dose. 


Decrease Solu-Medrol to 40 mg IV daily.








(8) Malar rash


Is this a current diagnosis for this admission?: Yes   


Plan: 


ROSEANNE positive, anti-double-stranded DNA negative, low C3, normal C4, normal CH50


ESR 5.  CRP < 5.








(9) Hyponatremia with decreased serum osmolality


Is this a current diagnosis for this admission?: Yes   


Plan: 


Continue 3% saline until sodium 136+, at which time we will switch to normal 

saline.


Continue p.o. intake.  Liberalize salt intake.








(10) Elevated lipase


Is this a current diagnosis for this admission?: Yes   


Plan: 


Downtrending.  Repeat lipase in a.m.


The patient does have hypertriglyceridemia, possibly explaining her elevated 

lipase/acute pancreatitis.  Continue TriCor (fenofibrate) 145 mg p.o. daily.


Notably, the patient recently did have 20 days of treatment with amoxicillin, 

possibly contributing to an elevated lipase.








(11) Leukopenia


Is this a current diagnosis for this admission?: Yes   


Plan: 


Resolved.








(12) Abnormal LFTs


Is this a current diagnosis for this admission?: Yes   





(13) Chronic combined systolic and diastolic CHF (congestive heart failure)


Is this a current diagnosis for this admission?: Yes   





(14) Hypothyroidism


Qualifiers: 


   Hypothyroidism type: unspecified   Qualified Code(s): E03.9 - Hypothyroidism,

unspecified   


Is this a current diagnosis for this admission?: Yes   





(15) Hypertension


Qualifiers: 


   Hypertension type: essential hypertension   Qualified Code(s): I10 - 

Essential (primary) hypertension   


Is this a current diagnosis for this admission?: Yes   





(16) Thrombocytopenia


Is this a current diagnosis for this admission?: Yes   





(17) Hypomagnesemia


Is this a current diagnosis for this admission?: Yes   


Plan: 


Replete.








Critical Time


Critical Time (minutes): 60


Level of Care: ICU


-: 


1.  The care of a critical patient is a dynamic process.  This note is a 

representative synopsis but static in nature.  The timeframe for treatments 

given in order is not necessarily the actual time these treatments may have been

done.





2.  This patient requires critical care secondary to ongoing requirements for 

therapy not offered or safe outside the critical care environment.  Transfer to 

a lower level of care will result in altered life or limb morbidity and 

mortality.





3.  Multidisciplinary rounds completed.





4.  ABCDE bundle addressed.

## 2020-05-23 LAB
ANION GAP SERPL CALC-SCNC: 6 MMOL/L (ref 5–19)
ANION GAP SERPL CALC-SCNC: 7 MMOL/L (ref 5–19)
ANION GAP SERPL CALC-SCNC: 8 MMOL/L (ref 5–19)
ANION GAP SERPL CALC-SCNC: 9 MMOL/L (ref 5–19)
BUN SERPL-MCNC: 15 MG/DL (ref 7–20)
BUN SERPL-MCNC: 16 MG/DL (ref 7–20)
BUN SERPL-MCNC: 17 MG/DL (ref 7–20)
BUN SERPL-MCNC: 19 MG/DL (ref 7–20)
CALCIUM: 9.2 MG/DL (ref 8.4–10.2)
CALCIUM: 9.2 MG/DL (ref 8.4–10.2)
CALCIUM: 9.4 MG/DL (ref 8.4–10.2)
CALCIUM: 9.9 MG/DL (ref 8.4–10.2)
CHLORIDE SERPL-SCNC: 100 MMOL/L (ref 98–107)
CHLORIDE SERPL-SCNC: 101 MMOL/L (ref 98–107)
CHLORIDE SERPL-SCNC: 103 MMOL/L (ref 98–107)
CHLORIDE SERPL-SCNC: 103 MMOL/L (ref 98–107)
CO2 SERPL-SCNC: 25 MMOL/L (ref 22–30)
CO2 SERPL-SCNC: 26 MMOL/L (ref 22–30)
CO2 SERPL-SCNC: 27 MMOL/L (ref 22–30)
CO2 SERPL-SCNC: 28 MMOL/L (ref 22–30)
GLUCOSE SERPL-MCNC: 104 MG/DL (ref 75–110)
GLUCOSE SERPL-MCNC: 121 MG/DL (ref 75–110)
GLUCOSE SERPL-MCNC: 124 MG/DL (ref 75–110)
GLUCOSE SERPL-MCNC: 146 MG/DL (ref 75–110)
POTASSIUM SERPL-SCNC: 3.4 MMOL/L (ref 3.6–5)
POTASSIUM SERPL-SCNC: 3.7 MMOL/L (ref 3.6–5)
POTASSIUM SERPL-SCNC: 3.7 MMOL/L (ref 3.6–5)
POTASSIUM SERPL-SCNC: 3.9 MMOL/L (ref 3.6–5)

## 2020-05-23 RX ADMIN — MAGNESIUM SULFATE IN DEXTROSE SCH MLS/HR: 10 INJECTION, SOLUTION INTRAVENOUS at 00:58

## 2020-05-23 RX ADMIN — POTASSIUM CHLORIDE SCH MLS/HR: 29.8 INJECTION, SOLUTION INTRAVENOUS at 09:26

## 2020-05-23 RX ADMIN — CARVEDILOL SCH MG: 12.5 TABLET, FILM COATED ORAL at 21:43

## 2020-05-23 RX ADMIN — HEPARIN SODIUM SCH: 5000 INJECTION, SOLUTION INTRAVENOUS; SUBCUTANEOUS at 05:43

## 2020-05-23 RX ADMIN — OXYCODONE HYDROCHLORIDE SCH MG: 10 TABLET, FILM COATED, EXTENDED RELEASE ORAL at 09:24

## 2020-05-23 RX ADMIN — TEMAZEPAM SCH MG: 15 CAPSULE ORAL at 21:44

## 2020-05-23 RX ADMIN — BENAZEPRIL HYDROCHLORIDE SCH MG: 5 TABLET ORAL at 21:44

## 2020-05-23 RX ADMIN — BUDESONIDE SCH MG: 0.25 SUSPENSION RESPIRATORY (INHALATION) at 08:41

## 2020-05-23 RX ADMIN — OXYCODONE HYDROCHLORIDE SCH MG: 10 TABLET, FILM COATED, EXTENDED RELEASE ORAL at 21:43

## 2020-05-23 RX ADMIN — Medication SCH: at 22:01

## 2020-05-23 RX ADMIN — MORPHINE SULFATE PRN MG: 10 INJECTION INTRAMUSCULAR; INTRAVENOUS; SUBCUTANEOUS at 09:32

## 2020-05-23 RX ADMIN — FENOFIBRATE SCH MG: 145 TABLET ORAL at 09:24

## 2020-05-23 RX ADMIN — HEPARIN SODIUM SCH UNIT: 5000 INJECTION, SOLUTION INTRAVENOUS; SUBCUTANEOUS at 21:43

## 2020-05-23 RX ADMIN — SIMVASTATIN SCH MG: 40 TABLET, FILM COATED ORAL at 18:39

## 2020-05-23 RX ADMIN — METHOCARBAMOL SCH MG: 500 TABLET ORAL at 18:39

## 2020-05-23 RX ADMIN — OXYCODONE HYDROCHLORIDE PRN MG: 5 TABLET ORAL at 06:24

## 2020-05-23 RX ADMIN — PREGABALIN SCH MG: 75 CAPSULE ORAL at 09:25

## 2020-05-23 RX ADMIN — Medication SCH: at 05:43

## 2020-05-23 RX ADMIN — HYDRALAZINE HYDROCHLORIDE PRN MG: 20 INJECTION INTRAMUSCULAR; INTRAVENOUS at 00:58

## 2020-05-23 RX ADMIN — FAMOTIDINE SCH MG: 10 INJECTION INTRAVENOUS at 21:43

## 2020-05-23 RX ADMIN — OXYCODONE HYDROCHLORIDE PRN MG: 5 TABLET ORAL at 18:39

## 2020-05-23 RX ADMIN — BUDESONIDE SCH MG: 0.25 SUSPENSION RESPIRATORY (INHALATION) at 20:44

## 2020-05-23 RX ADMIN — FAMOTIDINE SCH MG: 10 INJECTION INTRAVENOUS at 09:27

## 2020-05-23 RX ADMIN — OXYCODONE HYDROCHLORIDE PRN MG: 5 TABLET ORAL at 13:48

## 2020-05-23 RX ADMIN — METHOCARBAMOL SCH MG: 500 TABLET ORAL at 13:48

## 2020-05-23 RX ADMIN — HYDRALAZINE HYDROCHLORIDE PRN MG: 20 INJECTION INTRAMUSCULAR; INTRAVENOUS at 18:43

## 2020-05-23 RX ADMIN — ASPIRIN SCH MG: 81 TABLET, COATED ORAL at 09:23

## 2020-05-23 RX ADMIN — METHOCARBAMOL SCH MG: 500 TABLET ORAL at 21:44

## 2020-05-23 RX ADMIN — LEVOTHYROXINE SODIUM SCH MG: 25 TABLET ORAL at 09:23

## 2020-05-23 RX ADMIN — METHOCARBAMOL SCH MG: 500 TABLET ORAL at 09:25

## 2020-05-23 RX ADMIN — POTASSIUM CHLORIDE SCH MLS/HR: 29.8 INJECTION, SOLUTION INTRAVENOUS at 05:51

## 2020-05-23 RX ADMIN — SODIUM CHLORIDE PRN MLS/HR: 3 INJECTION, SOLUTION INTRAVENOUS at 09:29

## 2020-05-23 RX ADMIN — HYDRALAZINE HYDROCHLORIDE PRN MG: 20 INJECTION INTRAMUSCULAR; INTRAVENOUS at 05:51

## 2020-05-23 RX ADMIN — CARVEDILOL SCH MG: 12.5 TABLET, FILM COATED ORAL at 09:25

## 2020-05-23 RX ADMIN — MORPHINE SULFATE PRN MG: 10 INJECTION INTRAMUSCULAR; INTRAVENOUS; SUBCUTANEOUS at 13:41

## 2020-05-23 RX ADMIN — IPRATROPIUM BROMIDE AND ALBUTEROL SULFATE SCH ML: 2.5; .5 SOLUTION RESPIRATORY (INHALATION) at 08:41

## 2020-05-23 RX ADMIN — IPRATROPIUM BROMIDE AND ALBUTEROL SULFATE SCH ML: 2.5; .5 SOLUTION RESPIRATORY (INHALATION) at 14:02

## 2020-05-23 RX ADMIN — Medication SCH ML: at 13:49

## 2020-05-23 RX ADMIN — IPRATROPIUM BROMIDE AND ALBUTEROL SULFATE SCH ML: 2.5; .5 SOLUTION RESPIRATORY (INHALATION) at 02:05

## 2020-05-23 RX ADMIN — LIDOCAINE SCH PATCH: 50 PATCH CUTANEOUS at 09:27

## 2020-05-23 RX ADMIN — NITROGLYCERIN SCH EACH: 0.4 PATCH TRANSDERMAL at 09:27

## 2020-05-23 RX ADMIN — METHYLPREDNISOLONE SODIUM SUCCINATE SCH MG: 40 INJECTION, POWDER, FOR SOLUTION INTRAMUSCULAR; INTRAVENOUS at 09:27

## 2020-05-23 RX ADMIN — IPRATROPIUM BROMIDE AND ALBUTEROL SULFATE SCH ML: 2.5; .5 SOLUTION RESPIRATORY (INHALATION) at 20:44

## 2020-05-23 RX ADMIN — HEPARIN SODIUM SCH UNIT: 5000 INJECTION, SOLUTION INTRAVENOUS; SUBCUTANEOUS at 13:48

## 2020-05-23 RX ADMIN — MAGNESIUM SULFATE IN DEXTROSE SCH MLS/HR: 10 INJECTION, SOLUTION INTRAVENOUS at 03:41

## 2020-05-23 NOTE — PDOC CRITICAL CARE PROG REPORT
General


Date:: 05/23/20


ICU Day:: 6


Hospital Day:: 6


Resuscitation Status: Full Code


Events in the past 12 to 24 Hours:: 


This 74-year-old  female reformed smoker presented to [facility] emergency department on 5/18/2020 with generalized weakness, associated

with decreased appetite, recurrent emesis and shortness of breath.  She reported

recent treatment for "pneumonia" and "persistent pneumonia" with two 10-day 

courses of amoxicillin.  She also reported having nausea and emesis during this 

time (preceding antibiotic therapy).  In the emergency department, Na 111, 

elevated lipase.  Admitted to ICU for treatment with 3% saline .





5/19: Sodium 116, rising at an optimal rate of 0.5 mEq/h.  Nausea controlled 

with Zofran.  Tolerating p.o.  Still on 3% saline at 30 mL/h.  The patient is 

undergoing treatment for hypotonic, euvolemic hyponatremia.  The patient does 

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  On 

physical exam, the patient is noted to have a malar rash and diffuse maculo

papular rash involving her whole body (neck, torso, extensor surfaces of limbs).

 She denies any history of lupus.





5/20: Sodium 125.  3% saline infusion was decreased to 20 mL/h overnight but has

subsequently been increased to 40 mL/h.  Today, her malar rash and diffuse 

maculopapular rash are significantly less noticeable (without definitive 

therapy).  CT abdomen/chest with contrast was done this morning and reveals a 

right hilar mass with postobstructive pneumonia with hilar, precarinal and 

subcarinal lymphadenopathy.  There is an associated right pleural effusion 

(tiny).  Additionally, there is a 4 cm right lower lobe nodule.  Abdominal CT 

did incidentally find a right renal cyst and dilatation of the right renal 

pelvis and the right proximal ureter without evidence of stones.





5/21: Sodium up to 132 today.  Still on 3% saline at 40 mL/h.  Malar rash and 

diffuse maculopapular rash were visibly improved yesterday (perhaps after a dose

of steroids).  Today, the skin eruptions have returned (no subsequent steroid 

dosing).  I have discussed with the patient that she will likely need a 

endobronchial ultrasound-guided transbronchial lung lymph node biopsy, which is 

apparently not available at this institution.  In the interim, ROSEANNE was positive,

SSA positive, low C3.  Double-stranded DNA antibody negative.





5/22: 3% saline infusion was interrupted when sodium 135 for need of repeating 

magnesium, during which time she drifted from 135 back down to 132.  She was 

subsequently restarted on 3% saline but was also given furosemide 40 mg IV 

single dose.  She remained at 132.  Still on 3% saline at 40 mL/h.  She was 

started on Solu-Medrol 60 mg IV daily yesterday.  Malar rash and diffuse 

maculopapular rash have again visibly improved.





5/23: Sodium 135.  Psychomotor agitation with steroid therapy.  Blood pressure 

200/100.  Solu-Medrol dose reduced to 40 mg today.


Review of systems relevant to events:: 


Gastrointestinal: Nausea/emesis.


Renal/metabolic: Hyponatremia


Cutaneous: Rash


Pulmonary: Abnormal chest x-ray


Reason for ICU Addmission:: severe hyponatremia





- Medications:


Medications reviewed and adjusted accordingly: Yes





Physical Exam


Vital Signs: 


                                        











Temp Pulse Resp BP Pulse Ox


 


 99.0 F   78   17   194/81 H  93 


 


 05/23/20 06:00  05/23/20 02:05  05/23/20 06:00  05/23/20 05:37  05/23/20 06:00








                                 Intake & Output











 05/22/20 05/23/20 05/24/20





 06:59 06:59 06:59


 


Intake Total 300 100 


 


Output Total 2836 2175 


 


Balance -2536 -9937 


 


Weight 73.1 kg 75.7 kg 








                                  Weight/Height





Weight                           75.7 kg


Height                           1.7 m








General appearance: PRESENT: no acute distress, well-developed, well-nourished


Head exam: PRESENT: atraumatic, normocephalic, other


Eye exam: PRESENT: conjunctiva pink, EOMI, PERRLA.  ABSENT: scleral icterus


Neck exam: ABSENT: carotid bruit, JVD, lymphadenopathy, thyromegaly


Respiratory exam: PRESENT: crackles, rhonchi


Cardiovascular exam: PRESENT: RRR.  ABSENT: diastolic murmur, rubs, systolic 

murmur


Pulses: PRESENT: normal dorsalis pedis pul


GI/Abdominal exam: PRESENT: normal bowel sounds, soft.  ABSENT: distended, 

guarding, mass, organolmegaly, rebound, tenderness


Extremities exam: PRESENT: full ROM, pedal edema.  ABSENT: calf tenderness, 

clubbing


Neurological exam: PRESENT: alert, awake, oriented to person, oriented to place,

oriented to time, oriented to situation, CN II-XII grossly intact.  ABSENT: 

motor sensory deficit


Psychiatric exam: PRESENT: agitated, anxious


Skin exam: PRESENT: dry, intact, rash - Malar rash; diffuse maculopapular rash 

(considerably improved), warm.  ABSENT: cyanosis





Laboratory/Radiographs


Laboratory Results: 


                                        





                                 05/20/20 05:10 





                                 05/23/20 04:11 





                                        











  05/22/20 05/22/20 05/22/20





  10:02 15:51 20:55


 


Sodium  134.4 L  134.0 L  135.3 L


 


Potassium  3.3 L  3.6  3.7


 


Chloride  94 L  96 L  97 L


 


Carbon Dioxide  31 H  28  30


 


Anion Gap  9  10  8


 


BUN  10  13  15


 


Creatinine  0.67  0.64  0.67


 


Est GFR ( Amer)  > 60  > 60  > 60


 


Glucose  142 H  159 H  151 H


 


Calcium  9.7  9.4  9.6


 


Magnesium    1.9


 


Lipase   














  05/23/20 05/23/20





  04:11 04:11


 


Sodium  135.0 L 


 


Potassium  3.4 L 


 


Chloride  100 


 


Carbon Dioxide  26 


 


Anion Gap  9 


 


BUN  16 


 


Creatinine  0.62 


 


Est GFR (African Amer)  > 60 


 


Glucose  121 H 


 


Calcium  9.4 


 


Magnesium   2.4 H


 


Lipase  919.8 H 








                                        











  05/18/20 05/18/20 05/19/20





  14:15 14:15 14:05


 


Creatine Kinase    48


 


Troponin I   0.049 


 


NT-Pro-B Natriuret Pep  247  











Impressions: 


                                        





Acute Abdomen Series  05/18/20 15:06


IMPRESSION:  Constipation.  No obstruction.


 








Abdomen/Pelvis CT  05/20/20 00:00


IMPRESSION:  Probable right central hilar mass with postobstructive pneumonia.  

Recommend bronchoscopy for further evaluation.  There is pathologic precarinal 

and subcarinal adenopathy.  There is a small right pleural effusion.  Minimal 

right basilar atelectasis.  Stable 3.9 mm nodule in the periphery of the right 

lower lobe.


 


IMPRESSION:  Dilatation of the right collecting system possibly chronic.  No 

stones are identified.  No obstructing lesions.  No evidence of metastatic 

disease in the abdomen or pelvis.


Degenerative changes and postsurgical changes in the lumbar spine.


 








Chest CT  05/20/20 00:00


IMPRESSION:  Probable right central hilar mass with postobstructive pneumonia.  

Recommend bronchoscopy for further evaluation.  There is pathologic precarinal 

and subcarinal adenopathy.  There is a small right pleural effusion.  Minimal 

right basilar atelectasis.  Stable 3.9 mm nodule in the periphery of the right 

lower lobe.


 


IMPRESSION:  Dilatation of the right collecting system possibly chronic.  No 

stones are identified.  No obstructing lesions.  No evidence of metastatic 

disease in the abdomen or pelvis.


Degenerative changes and postsurgical changes in the lumbar spine.


 











All labs, radiographs, diagnostic studies and EKGs were personally reviewed: Yes


In addition, reports of radiographic and diagnostic studies were read: Yes





Assessment and Plan





- Diagnosis


(1) Hyponatremia with decreased serum osmolality


Is this a current diagnosis for this admission?: Yes   


Plan: 


Continue 3% saline until sodium 136+, at which time we will switch to normal 

saline.


Continue p.o. intake.  Liberalize salt intake.








(2) Right lower lobe lung mass


Is this a current diagnosis for this admission?: Yes   


Plan: 


Based on the patient's strong family history for lung cancer and personal 

history of smoking, this is highly suspicious for malignancy, and it may be the 

etiology of her SIADH.


After correction of her hyponatremia, the patient needs to undergo diagnostic 

evaluation.


While her initial radiographic findings certainly raise concern for stage IV 

disease, the patient has been found to be ROSEANNE positive, SSA positive with low 

C3, systemic lupus erythematosus and Sjogren's syndrome need to be considered 

and may be contributing to her radiographic abnormalities.  While I still 

maintain concern for lung malignancy, the patient now has another reason to 

undergo invasive diagnostic testing (fiberoptic bronchoscopy with transbronchial

lung biopsies, endobronchial ultrasound-guided transbronchial lymph node 

biopsies, brushings for cytology and possible endobronchial biopsies).


She will need transfer to a facility that we will be able to provide her a 

higher level of care, as we are unable to pursue this diagnostic evaluation at 

this facility.








(3) Pleural effusion, right


Is this a current diagnosis for this admission?: Yes   


Plan: 


Thus far too small to tap.








(4) Lymphadenopathy, hilar


Is this a current diagnosis for this admission?: Yes   





(5) Lymphadenopathy, mediastinal


Is this a current diagnosis for this admission?: Yes   





(6) Postobstructive pneumonia


Is this a current diagnosis for this admission?: Yes   


Plan: 


Treated with two 10-day courses of amoxicillin.  Currently, patient is afebrile 

and has no sputum production.  She is on room air without hypoxia.








(7) Abnormal chest x-ray


Is this a current diagnosis for this admission?: Yes   





(8) Maculopapular rash, generalized


Is this a current diagnosis for this admission?: Yes   





(9) Malar rash


Is this a current diagnosis for this admission?: Yes   





(10) Elevated lipase


Is this a current diagnosis for this admission?: Yes   


Plan: 


Downtrending.  Repeat lipase in a.m.


The patient does have hypertriglyceridemia, possibly explaining her elevated 

lipase/acute pancreatitis.  Continue TriCor (fenofibrate) 145 mg p.o. daily.


Notably, the patient recently did have 20 days of treatment with amoxicillin, 

possibly contributing to an elevated lipase.








(11) Leukopenia


Is this a current diagnosis for this admission?: Yes   





(12) Abnormal LFTs


Is this a current diagnosis for this admission?: Yes   





(13) Chronic combined systolic and diastolic CHF (congestive heart failure)


Is this a current diagnosis for this admission?: Yes   





(14) Hypothyroidism


Qualifiers: 


   Hypothyroidism type: unspecified   Qualified Code(s): E03.9 - Hypothyroidism,

unspecified   


Is this a current diagnosis for this admission?: Yes   





(15) Hypertension


Qualifiers: 


   Hypertension type: essential hypertension   Qualified Code(s): I10 - 

Essential (primary) hypertension   


Is this a current diagnosis for this admission?: Yes   


Plan: 


On carvedilol 12.5 mg p.o. twice daily, benazepril 10 mg p.o. nightly.


Increase carvedilol to 25 mg p.o. twice daily.


As I suspect that there is a component of psychomotor agitation contributing to 

her poorly controlled hypertension, will try Xanax 0.25 mg (single dose for 

now).








(16) Thrombocytopenia


Is this a current diagnosis for this admission?: Yes   


Plan: 


Improving.  Monitor CBC.








(17) Hypomagnesemia


Is this a current diagnosis for this admission?: Yes   


Plan: 


Replete.








Critical Time


Critical Time (minutes): 60


Level of Care: ICU


-: 


1.  The care of a critical patient is a dynamic process.  This note is a 

representative synopsis but static in nature.  The timeframe for treatments 

given in order is not necessarily the actual time these treatments may have been

done.





2.  This patient requires critical care secondary to ongoing requirements for 

therapy not offered or safe outside the critical care environment.  Transfer to 

a lower level of care will result in altered life or limb morbidity and 

mortality.





3.  Multidisciplinary rounds completed.





4.  ABCDE bundle addressed.

## 2020-05-24 LAB
ANION GAP SERPL CALC-SCNC: 8 MMOL/L (ref 5–19)
ANION GAP SERPL CALC-SCNC: 9 MMOL/L (ref 5–19)
APPEARANCE UR: (no result)
APTT PPP: YELLOW S
BILIRUB UR QL STRIP: NEGATIVE
BUN SERPL-MCNC: 20 MG/DL (ref 7–20)
BUN SERPL-MCNC: 21 MG/DL (ref 7–20)
CALCIUM: 9.7 MG/DL (ref 8.4–10.2)
CALCIUM: 9.8 MG/DL (ref 8.4–10.2)
CHLORIDE SERPL-SCNC: 104 MMOL/L (ref 98–107)
CHLORIDE SERPL-SCNC: 106 MMOL/L (ref 98–107)
CO2 SERPL-SCNC: 24 MMOL/L (ref 22–30)
CO2 SERPL-SCNC: 25 MMOL/L (ref 22–30)
GLUCOSE SERPL-MCNC: 104 MG/DL (ref 75–110)
GLUCOSE SERPL-MCNC: 113 MG/DL (ref 75–110)
GLUCOSE UR STRIP-MCNC: NEGATIVE MG/DL
KETONES UR STRIP-MCNC: NEGATIVE MG/DL
NITRITE UR QL STRIP: NEGATIVE
PH UR STRIP: 5 [PH] (ref 5–9)
POTASSIUM SERPL-SCNC: 3.6 MMOL/L (ref 3.6–5)
POTASSIUM SERPL-SCNC: 3.7 MMOL/L (ref 3.6–5)
PROT UR STRIP-MCNC: 30 MG/DL
SP GR UR STRIP: 1.02
UROBILINOGEN UR-MCNC: NEGATIVE MG/DL (ref ?–2)

## 2020-05-24 RX ADMIN — BUDESONIDE SCH MG: 0.25 SUSPENSION RESPIRATORY (INHALATION) at 08:19

## 2020-05-24 RX ADMIN — Medication SCH: at 21:15

## 2020-05-24 RX ADMIN — CARVEDILOL SCH MG: 12.5 TABLET, FILM COATED ORAL at 21:15

## 2020-05-24 RX ADMIN — FAMOTIDINE SCH MG: 10 INJECTION INTRAVENOUS at 21:14

## 2020-05-24 RX ADMIN — MORPHINE SULFATE PRN MG: 10 INJECTION INTRAMUSCULAR; INTRAVENOUS; SUBCUTANEOUS at 18:20

## 2020-05-24 RX ADMIN — METHOCARBAMOL SCH MG: 500 TABLET ORAL at 09:09

## 2020-05-24 RX ADMIN — OXYCODONE HYDROCHLORIDE PRN MG: 5 TABLET ORAL at 14:13

## 2020-05-24 RX ADMIN — HYDRALAZINE HYDROCHLORIDE PRN MG: 20 INJECTION INTRAMUSCULAR; INTRAVENOUS at 02:58

## 2020-05-24 RX ADMIN — METHOCARBAMOL SCH MG: 500 TABLET ORAL at 14:13

## 2020-05-24 RX ADMIN — PREGABALIN SCH MG: 75 CAPSULE ORAL at 09:10

## 2020-05-24 RX ADMIN — MORPHINE SULFATE PRN MG: 10 INJECTION INTRAMUSCULAR; INTRAVENOUS; SUBCUTANEOUS at 02:54

## 2020-05-24 RX ADMIN — HYDRALAZINE HYDROCHLORIDE PRN MG: 20 INJECTION INTRAMUSCULAR; INTRAVENOUS at 06:58

## 2020-05-24 RX ADMIN — METHOCARBAMOL SCH MG: 500 TABLET ORAL at 18:20

## 2020-05-24 RX ADMIN — IPRATROPIUM BROMIDE AND ALBUTEROL SULFATE SCH ML: 2.5; .5 SOLUTION RESPIRATORY (INHALATION) at 08:19

## 2020-05-24 RX ADMIN — IPRATROPIUM BROMIDE AND ALBUTEROL SULFATE SCH: 2.5; .5 SOLUTION RESPIRATORY (INHALATION) at 02:57

## 2020-05-24 RX ADMIN — TEMAZEPAM SCH MG: 15 CAPSULE ORAL at 21:15

## 2020-05-24 RX ADMIN — OXYCODONE HYDROCHLORIDE SCH MG: 10 TABLET, FILM COATED, EXTENDED RELEASE ORAL at 21:14

## 2020-05-24 RX ADMIN — HEPARIN SODIUM SCH UNIT: 5000 INJECTION, SOLUTION INTRAVENOUS; SUBCUTANEOUS at 21:14

## 2020-05-24 RX ADMIN — IPRATROPIUM BROMIDE AND ALBUTEROL SULFATE SCH ML: 2.5; .5 SOLUTION RESPIRATORY (INHALATION) at 19:57

## 2020-05-24 RX ADMIN — HEPARIN SODIUM SCH UNIT: 5000 INJECTION, SOLUTION INTRAVENOUS; SUBCUTANEOUS at 14:14

## 2020-05-24 RX ADMIN — DEXAMETHASONE SODIUM PHOSPHATE PRN MG: 10 INJECTION INTRAMUSCULAR; INTRAVENOUS at 09:55

## 2020-05-24 RX ADMIN — IPRATROPIUM BROMIDE AND ALBUTEROL SULFATE SCH ML: 2.5; .5 SOLUTION RESPIRATORY (INHALATION) at 13:54

## 2020-05-24 RX ADMIN — SODIUM CHLORIDE PRN MLS/HR: 9 INJECTION, SOLUTION INTRAVENOUS at 06:57

## 2020-05-24 RX ADMIN — SIMVASTATIN SCH MG: 40 TABLET, FILM COATED ORAL at 18:20

## 2020-05-24 RX ADMIN — METHOCARBAMOL SCH MG: 500 TABLET ORAL at 21:14

## 2020-05-24 RX ADMIN — HEPARIN SODIUM SCH: 5000 INJECTION, SOLUTION INTRAVENOUS; SUBCUTANEOUS at 05:49

## 2020-05-24 RX ADMIN — Medication SCH ML: at 14:14

## 2020-05-24 RX ADMIN — FENOFIBRATE SCH MG: 145 TABLET ORAL at 09:09

## 2020-05-24 RX ADMIN — NITROGLYCERIN SCH EACH: 0.4 PATCH TRANSDERMAL at 09:07

## 2020-05-24 RX ADMIN — Medication SCH: at 05:49

## 2020-05-24 RX ADMIN — MORPHINE SULFATE PRN MG: 10 INJECTION INTRAMUSCULAR; INTRAVENOUS; SUBCUTANEOUS at 09:08

## 2020-05-24 RX ADMIN — CARVEDILOL SCH MG: 12.5 TABLET, FILM COATED ORAL at 09:10

## 2020-05-24 RX ADMIN — LIDOCAINE SCH PATCH: 50 PATCH CUTANEOUS at 09:12

## 2020-05-24 RX ADMIN — OXYCODONE HYDROCHLORIDE SCH MG: 10 TABLET, FILM COATED, EXTENDED RELEASE ORAL at 09:11

## 2020-05-24 RX ADMIN — LEVOTHYROXINE SODIUM SCH MG: 25 TABLET ORAL at 09:10

## 2020-05-24 RX ADMIN — METHYLPREDNISOLONE SODIUM SUCCINATE SCH MG: 40 INJECTION, POWDER, FOR SOLUTION INTRAMUSCULAR; INTRAVENOUS at 09:08

## 2020-05-24 RX ADMIN — FAMOTIDINE SCH MG: 10 INJECTION INTRAVENOUS at 09:07

## 2020-05-24 RX ADMIN — ASPIRIN SCH MG: 81 TABLET, COATED ORAL at 09:09

## 2020-05-24 RX ADMIN — BUDESONIDE SCH MG: 0.25 SUSPENSION RESPIRATORY (INHALATION) at 19:57

## 2020-05-24 RX ADMIN — BENAZEPRIL HYDROCHLORIDE SCH MG: 5 TABLET ORAL at 21:14

## 2020-05-24 NOTE — RADIOLOGY REPORT (SQ)
AP Portable chest: 5/24/2020 4:49 AM CDT



History: 74-year old patient with concern for right-sided pleural

effusion.



Comparison: Chest x-ray from 1/17/2018



Findings: The cardiomediastinal silhouette is enlarged. No

pneumothorax is seen. There is a masslike opacity noted at the

right lung base and right midlung. This may extend from the right

hilar region. There is mild elevation of the right hemidiaphragm.

A dual-lead left-sided pacemaker/AICD is seen. Atherosclerotic

calcifications are seen at the aortic arch.



Impression: There is a masslike airspace opacity noted at the

right lung base and right midlung concerning for infection.

Follow-up is recommended to exclude a central obstructive mass.

## 2020-05-24 NOTE — PDOC CRITICAL CARE PROG REPORT
General


Date:: 05/24/20


ICU Day:: 7


Hospital Day:: 7


Resuscitation Status: Full Code


Events in the past 12 to 24 Hours:: 


This 74-year-old  female reformed smoker presented to [facility] emergency department on 5/18/2020 with generalized weakness, associated

with decreased appetite, recurrent emesis and shortness of breath.  She reported

recent treatment for "pneumonia" and "persistent pneumonia" with two 10-day 

courses of amoxicillin.  She also reported having nausea and emesis during this 

time (preceding antibiotic therapy).  In the emergency department, Na 111, 

elevated lipase.  Admitted to ICU for treatment with 3% saline .





5/19: Sodium 116, rising at an optimal rate of 0.5 mEq/h.  Nausea controlled 

with Zofran.  Tolerating p.o.  Still on 3% saline at 30 mL/h.  The patient is 

undergoing treatment for hypotonic, euvolemic hyponatremia.  The patient does 

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  On 

physical exam, the patient is noted to have a malar rash and diffuse maculo

papular rash involving her whole body (neck, torso, extensor surfaces of limbs).

 She denies any history of lupus.





5/20: Sodium 125.  3% saline infusion was decreased to 20 mL/h overnight but has

subsequently been increased to 40 mL/h.  Today, her malar rash and diffuse 

maculopapular rash are significantly less noticeable (without definitive 

therapy).  CT abdomen/chest with contrast was done this morning and reveals a 

right hilar mass with postobstructive pneumonia with hilar, precarinal and 

subcarinal lymphadenopathy.  There is an associated right pleural effusion 

(tiny).  Additionally, there is a 4 cm right lower lobe nodule.  Abdominal CT 

did incidentally find a right renal cyst and dilatation of the right renal 

pelvis and the right proximal ureter without evidence of stones.





5/21: Sodium up to 132 today.  Still on 3% saline at 40 mL/h.  Malar rash and 

diffuse maculopapular rash were visibly improved yesterday (perhaps after a dose

of steroids).  Today, the skin eruptions have returned (no subsequent steroid 

dosing).  I have discussed with the patient that she will likely need a 

endobronchial ultrasound-guided transbronchial lung lymph node biopsy, which is 

apparently not available at this institution.  In the interim, ROSEANNE was positive,

SSA positive, low C3.  Double-stranded DNA antibody negative.





5/22: 3% saline infusion was interrupted when sodium 135 for need of repeating 

magnesium, during which time she drifted from 135 back down to 132.  She was 

subsequently restarted on 3% saline but was also given furosemide 40 mg IV 

single dose.  She remained at 132.  Still on 3% saline at 40 mL/h.  She was 

started on Solu-Medrol 60 mg IV daily yesterday.  Malar rash and diffuse 

maculopapular rash have again visibly improved.





5/23: Sodium 135.  Psychomotor agitation with steroid therapy.  Blood pressure 

200/100.  Solu-Medrol dose reduced to 40 mg today.





5/24: Sodium 139 today.  Now on normal saline.  On Solu-Medrol 40 mg daily.  The

patient reports less psychomotor agitation, albeit also with the help of Xanax 

yesterday.  Today, she reports that she is experiencing burning pain and notices

a foul odor from her urine.


Review of systems relevant to events:: 


Gastrointestinal: Nausea/emesis.


Renal/metabolic: Hyponatremia


Cutaneous: Rash


Pulmonary: Abnormal chest x-ray


Reason for ICU Addmission:: severe hyponatremia





- Medications:


Medications reviewed and adjusted accordingly: Yes





Physical Exam


Vital Signs: 


                                        











Temp Pulse Resp BP Pulse Ox


 


 98.1 F   90   18   195/86 H  92 


 


 05/24/20 08:00  05/24/20 08:19  05/24/20 08:19  05/24/20 08:00  05/24/20 08:19








                                 Intake & Output











 05/23/20 05/24/20 05/25/20





 06:59 06:59 06:59


 


Intake Total 100 50 


 


Output Total 2175 575 0


 


Balance -2075 -525 0


 


Weight 75.7 kg 74.5 kg 








                                  Weight/Height





Weight                           74.5 kg


Height                           1.7 m








General appearance: PRESENT: no acute distress, well-developed, well-nourished


Head exam: PRESENT: atraumatic, normocephalic


Eye exam: PRESENT: conjunctiva pink, EOMI, PERRLA.  ABSENT: scleral icterus


Neck exam: ABSENT: carotid bruit, JVD, lymphadenopathy, thyromegaly


Respiratory exam: PRESENT: crackles - Bilaterally, rhonchi - Scant, right 

midlung.  ABSENT: wheezes


Cardiovascular exam: PRESENT: RRR.  ABSENT: diastolic murmur, rubs, systolic 

murmur


Pulses: PRESENT: normal dorsalis pedis pul


GI/Abdominal exam: PRESENT: normal bowel sounds, soft.  ABSENT: distended, 

guarding, mass, organolmegaly, rebound, tenderness


Extremities exam: PRESENT: full ROM, pedal edema, +1 edema.  ABSENT: calf 

tenderness, clubbing


Neurological exam: PRESENT: alert, awake, oriented to person, oriented to place,

oriented to time, oriented to situation, CN II-XII grossly intact.  ABSENT: 

motor sensory deficit


Psychiatric exam: PRESENT: appropriate affect, normal mood.  ABSENT: homicidal 

ideation, suicidal ideation


Skin exam: PRESENT: dry, intact, warm, other - Her malar rash and maculopapular 

rash do not appear worse today, perhaps a little better..  ABSENT: cyanosis, 

rash





Laboratory/Radiographs


Laboratory Results: 


                                        





                                 05/20/20 05:10 





                                 05/24/20 04:28 





                                        











  05/23/20 05/23/20 05/23/20





  10:44 15:35 22:18


 


Sodium  135.0 L  136.1 L  136.8 L


 


Potassium  3.7  3.9  3.7


 


Chloride  101  103  103


 


Carbon Dioxide  27  25  28


 


Anion Gap  7  8  6


 


BUN  15  17  19


 


Creatinine  0.63  0.68  0.68


 


Est GFR ( Amer)  > 60  > 60  > 60


 


Glucose  124 H  146 H  104


 


Calcium  9.2  9.2  9.9














  05/24/20





  04:28


 


Sodium  138.8


 


Potassium  3.7


 


Chloride  106


 


Carbon Dioxide  24


 


Anion Gap  9


 


BUN  20


 


Creatinine  0.73


 


Est GFR (African Amer)  > 60


 


Glucose  104


 


Calcium  9.8








                                        











  05/18/20 05/18/20 05/19/20





  14:15 14:15 14:05


 


Creatine Kinase    48


 


Troponin I   0.049 


 


NT-Pro-B Natriuret Pep  247  











Impressions: 


                                        





Acute Abdomen Series  05/18/20 15:06


IMPRESSION:  Constipation.  No obstruction.


 








Abdomen/Pelvis CT  05/20/20 00:00


IMPRESSION:  Probable right central hilar mass with postobstructive pneumonia.  

Recommend bronchoscopy for further evaluation.  There is pathologic precarinal 

and subcarinal adenopathy.  There is a small right pleural effusion.  Minimal 

right basilar atelectasis.  Stable 3.9 mm nodule in the periphery of the right 

lower lobe.


 


IMPRESSION:  Dilatation of the right collecting system possibly chronic.  No 

stones are identified.  No obstructing lesions.  No evidence of metastatic 

disease in the abdomen or pelvis.


Degenerative changes and postsurgical changes in the lumbar spine.


 








Chest CT  05/20/20 00:00


IMPRESSION:  Probable right central hilar mass with postobstructive pneumonia.  

Recommend bronchoscopy for further evaluation.  There is pathologic precarinal 

and subcarinal adenopathy.  There is a small right pleural effusion.  Minimal 

right basilar atelectasis.  Stable 3.9 mm nodule in the periphery of the right 

lower lobe.


 


IMPRESSION:  Dilatation of the right collecting system possibly chronic.  No 

stones are identified.  No obstructing lesions.  No evidence of metastatic 

disease in the abdomen or pelvis.


Degenerative changes and postsurgical changes in the lumbar spine.


 











All labs, radiographs, diagnostic studies and EKGs were personally reviewed: Yes


In addition, reports of radiographic and diagnostic studies were read: Yes





Assessment and Plan





- Diagnosis


(1) Dysuria


Is this a current diagnosis for this admission?: Yes   


Plan: 


Check urinalysis.








(2) Hyponatremia with decreased serum osmolality


Is this a current diagnosis for this admission?: Yes   


Plan: 


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


Continue normal saline.


Continue p.o. intake.  Liberalize salt intake.








(3) Right lower lobe lung mass


Is this a current diagnosis for this admission?: Yes   


Plan: 


She will need transfer to a facility that we will be able to provide her a 

higher level of care, as we are unable to pursue this diagnostic evaluation at 

this facility.


* The patient needs to undergo invasive diagnostic evaluation. 


* Based on the patient's strong family history for lung cancer and personal 

  history of smoking, her clinical presentation and findings are highly 

  suspicious for malignancy, and are likely to be the etiology of her SIADH.


* The patient has been found to be ROSEANNE positive, SSA positive with low C3, 

  systemic lupus erythematosus and Sjogren's syndrome need to be considered and 

  may be contributing to her radiographic abnormalities.








(4) Pleural effusion, right


Is this a current diagnosis for this admission?: Yes   





(5) Lymphadenopathy, hilar


Is this a current diagnosis for this admission?: Yes   





(6) Lymphadenopathy, mediastinal


Is this a current diagnosis for this admission?: Yes   





(7) Postobstructive pneumonia


Is this a current diagnosis for this admission?: Yes   


Plan: 


Treated with two 10-day courses of amoxicillin.  Currently, patient is afebrile 

and has no sputum production.  She is on room air without hypoxia.  Chest x-ray 

appears to be showing steady improvement in aeration (perhaps partly owing to 

initiation of steroid therapy).








(8) Abnormal chest x-ray


Is this a current diagnosis for this admission?: Yes   





(9) Maculopapular rash, generalized


Is this a current diagnosis for this admission?: Yes   





(10) Malar rash


Is this a current diagnosis for this admission?: Yes   





(11) Elevated lipase


Is this a current diagnosis for this admission?: Yes   





(12) Leukopenia


Is this a current diagnosis for this admission?: Yes   





(13) Abnormal LFTs


Is this a current diagnosis for this admission?: Yes   





(14) Chronic combined systolic and diastolic CHF (congestive heart failure)


Is this a current diagnosis for this admission?: Yes   


Plan: 


Monitor I/O.


Avoid use of diuretics in this patient.








(15) Hypothyroidism


Qualifiers: 


   Hypothyroidism type: unspecified   Qualified Code(s): E03.9 - Hypothyroidism,

unspecified   


Is this a current diagnosis for this admission?: Yes   





(16) Hypertension


Qualifiers: 


   Hypertension type: essential hypertension   Qualified Code(s): I10 - 

Essential (primary) hypertension   


Is this a current diagnosis for this admission?: Yes   





(17) Thrombocytopenia


Is this a current diagnosis for this admission?: Yes   





(18) Hypomagnesemia


Is this a current diagnosis for this admission?: Yes   





Critical Time


Critical Time (minutes): 45


Level of Care: ICU


-: 


1.  The care of a critical patient is a dynamic process.  This note is a 

representative synopsis but static in nature.  The timeframe for treatments 

given in order is not necessarily the actual time these treatments may have been

done.





2.  This patient requires critical care secondary to ongoing requirements for 

therapy not offered or safe outside the critical care environment.  Transfer to 

a lower level of care will result in altered life or limb morbidity and 

mortality.





3.  Multidisciplinary rounds completed.





4.  ABCDE bundle addressed.

## 2020-05-25 LAB
ANION GAP SERPL CALC-SCNC: 6 MMOL/L (ref 5–19)
BUN SERPL-MCNC: 24 MG/DL (ref 7–20)
CALCIUM: 9.8 MG/DL (ref 8.4–10.2)
CHLORIDE SERPL-SCNC: 104 MMOL/L (ref 98–107)
CO2 SERPL-SCNC: 26 MMOL/L (ref 22–30)
GLUCOSE SERPL-MCNC: 102 MG/DL (ref 75–110)
POTASSIUM SERPL-SCNC: 3.5 MMOL/L (ref 3.6–5)

## 2020-05-25 RX ADMIN — BUDESONIDE SCH MG: 0.25 SUSPENSION RESPIRATORY (INHALATION) at 20:36

## 2020-05-25 RX ADMIN — PREGABALIN SCH MG: 75 CAPSULE ORAL at 10:03

## 2020-05-25 RX ADMIN — FAMOTIDINE SCH MG: 10 INJECTION INTRAVENOUS at 10:02

## 2020-05-25 RX ADMIN — HEPARIN SODIUM SCH UNIT: 5000 INJECTION, SOLUTION INTRAVENOUS; SUBCUTANEOUS at 05:43

## 2020-05-25 RX ADMIN — OXYCODONE HYDROCHLORIDE SCH MG: 10 TABLET, FILM COATED, EXTENDED RELEASE ORAL at 10:02

## 2020-05-25 RX ADMIN — METHOCARBAMOL SCH MG: 500 TABLET ORAL at 17:03

## 2020-05-25 RX ADMIN — IPRATROPIUM BROMIDE AND ALBUTEROL SULFATE SCH ML: 2.5; .5 SOLUTION RESPIRATORY (INHALATION) at 08:11

## 2020-05-25 RX ADMIN — Medication SCH: at 14:29

## 2020-05-25 RX ADMIN — ACETAMINOPHEN PRN MG: 325 TABLET ORAL at 10:05

## 2020-05-25 RX ADMIN — BUDESONIDE SCH MG: 0.25 SUSPENSION RESPIRATORY (INHALATION) at 08:11

## 2020-05-25 RX ADMIN — MORPHINE SULFATE PRN MG: 10 INJECTION INTRAMUSCULAR; INTRAVENOUS; SUBCUTANEOUS at 16:35

## 2020-05-25 RX ADMIN — HYDRALAZINE HYDROCHLORIDE PRN MG: 20 INJECTION INTRAMUSCULAR; INTRAVENOUS at 18:17

## 2020-05-25 RX ADMIN — NITROGLYCERIN SCH EACH: 0.4 PATCH TRANSDERMAL at 10:16

## 2020-05-25 RX ADMIN — IPRATROPIUM BROMIDE AND ALBUTEROL SULFATE SCH ML: 2.5; .5 SOLUTION RESPIRATORY (INHALATION) at 20:36

## 2020-05-25 RX ADMIN — SODIUM CHLORIDE PRN MLS/HR: 9 INJECTION, SOLUTION INTRAVENOUS at 17:03

## 2020-05-25 RX ADMIN — HEPARIN SODIUM SCH: 5000 INJECTION, SOLUTION INTRAVENOUS; SUBCUTANEOUS at 14:14

## 2020-05-25 RX ADMIN — MORPHINE SULFATE PRN MG: 10 INJECTION INTRAMUSCULAR; INTRAVENOUS; SUBCUTANEOUS at 12:37

## 2020-05-25 RX ADMIN — HEPARIN SODIUM SCH UNIT: 5000 INJECTION, SOLUTION INTRAVENOUS; SUBCUTANEOUS at 21:11

## 2020-05-25 RX ADMIN — OXYCODONE HYDROCHLORIDE PRN MG: 5 TABLET ORAL at 18:58

## 2020-05-25 RX ADMIN — DEXAMETHASONE SODIUM PHOSPHATE PRN MG: 10 INJECTION INTRAMUSCULAR; INTRAVENOUS at 20:47

## 2020-05-25 RX ADMIN — OXYCODONE HYDROCHLORIDE PRN MG: 5 TABLET ORAL at 05:44

## 2020-05-25 RX ADMIN — ASPIRIN SCH MG: 81 TABLET, COATED ORAL at 10:04

## 2020-05-25 RX ADMIN — Medication SCH ML: at 21:13

## 2020-05-25 RX ADMIN — BENAZEPRIL HYDROCHLORIDE SCH MG: 5 TABLET ORAL at 21:11

## 2020-05-25 RX ADMIN — FENOFIBRATE SCH MG: 145 TABLET ORAL at 10:14

## 2020-05-25 RX ADMIN — LIDOCAINE SCH PATCH: 50 PATCH CUTANEOUS at 10:01

## 2020-05-25 RX ADMIN — ACETAMINOPHEN PRN MG: 325 TABLET ORAL at 18:17

## 2020-05-25 RX ADMIN — METHYLPREDNISOLONE SODIUM SUCCINATE SCH MG: 40 INJECTION, POWDER, FOR SOLUTION INTRAMUSCULAR; INTRAVENOUS at 10:04

## 2020-05-25 RX ADMIN — IPRATROPIUM BROMIDE AND ALBUTEROL SULFATE SCH ML: 2.5; .5 SOLUTION RESPIRATORY (INHALATION) at 02:12

## 2020-05-25 RX ADMIN — FAMOTIDINE SCH MG: 10 INJECTION INTRAVENOUS at 21:12

## 2020-05-25 RX ADMIN — METHOCARBAMOL SCH MG: 500 TABLET ORAL at 10:14

## 2020-05-25 RX ADMIN — IPRATROPIUM BROMIDE AND ALBUTEROL SULFATE SCH ML: 2.5; .5 SOLUTION RESPIRATORY (INHALATION) at 14:19

## 2020-05-25 RX ADMIN — TEMAZEPAM SCH MG: 15 CAPSULE ORAL at 23:29

## 2020-05-25 RX ADMIN — CARVEDILOL SCH MG: 12.5 TABLET, FILM COATED ORAL at 10:03

## 2020-05-25 RX ADMIN — Medication SCH: at 05:44

## 2020-05-25 RX ADMIN — SIMVASTATIN SCH MG: 40 TABLET, FILM COATED ORAL at 17:03

## 2020-05-25 RX ADMIN — METHOCARBAMOL SCH MG: 500 TABLET ORAL at 21:13

## 2020-05-25 RX ADMIN — CARVEDILOL SCH MG: 12.5 TABLET, FILM COATED ORAL at 21:10

## 2020-05-25 RX ADMIN — MORPHINE SULFATE PRN MG: 10 INJECTION INTRAMUSCULAR; INTRAVENOUS; SUBCUTANEOUS at 08:15

## 2020-05-25 RX ADMIN — METHOCARBAMOL SCH MG: 500 TABLET ORAL at 14:14

## 2020-05-25 RX ADMIN — MORPHINE SULFATE PRN MG: 10 INJECTION INTRAMUSCULAR; INTRAVENOUS; SUBCUTANEOUS at 20:47

## 2020-05-25 RX ADMIN — OXYCODONE HYDROCHLORIDE PRN MG: 5 TABLET ORAL at 12:33

## 2020-05-25 RX ADMIN — LEVOTHYROXINE SODIUM SCH MG: 25 TABLET ORAL at 08:15

## 2020-05-25 RX ADMIN — OXYCODONE HYDROCHLORIDE SCH MG: 10 TABLET, FILM COATED, EXTENDED RELEASE ORAL at 21:10

## 2020-05-25 NOTE — PDOC PROGRESS REPORT
Subjective


Progress Note for:: 05/25/20


Subjective:: 





No adverse events overnight.  No new complaints.  Vital signs been stable.  

Appetite is been fair.  No cough or shortness of breath.


Reason For Visit: 


SEVERE HYPONATREMIA








Physical Exam


Vital Signs: 


                                        











Temp Pulse Resp BP Pulse Ox


 


 98.5 F   64   16   166/70 H  95 


 


 05/25/20 12:00  05/25/20 14:19  05/25/20 14:19  05/25/20 12:00  05/25/20 14:19








                                 Intake & Output











 05/24/20 05/25/20 05/26/20





 06:59 06:59 06:59


 


Intake Total 50 240 120


 


Output Total 575 160 


 


Balance -525 80 120


 


Weight 74.5 kg 74.5 kg 











General appearance: PRESENT: no acute distress, cooperative, disheveled


Respiratory exam: PRESENT: crackles - Faint bibasilar, symmetrical, unlabored.  

ABSENT: accessory muscle use, chest wall tenderness, prolonged expiratory phas, 

rhonchi, tachypnea, wheezes


Cardiovascular exam: PRESENT: RRR, +S1, +S2


Pulses: PRESENT: normal carotid pulses


Vascular exam: PRESENT: normal capillary refill


GI/Abdominal exam: PRESENT: normal bowel sounds, soft.  ABSENT: distended, 

guarding, rebound, tenderness


Extremities exam: PRESENT: pedal edema, +1 edema.  ABSENT: clubbing


Musculoskeletal exam: PRESENT: normal inspection.  ABSENT: deformity


Neurological exam: PRESENT: alert, awake, oriented to person, oriented to place,

oriented to situation


Psychiatric exam: PRESENT: flat affect


Skin exam: PRESENT: dry, warm





Results


Laboratory Results: 


                                        





                                 05/20/20 05:10 





                                 05/25/20 08:45 





                                        











  05/25/20





  08:45


 


Sodium  135.7 L


 


Potassium  3.5 L


 


Chloride  104


 


Carbon Dioxide  26


 


Anion Gap  6


 


BUN  24 H


 


Creatinine  0.77


 


Est GFR (African Amer)  > 60


 


Glucose  102


 


Calcium  9.8








                                        





05/19/20 14:05   Blood   Blood Culture - Final


                            NO GROWTH IN 5 DAYS


05/19/20 12:43   Blood   Blood Culture - Final


                            NO GROWTH IN 5 DAYS





                                        











  05/18/20 05/18/20 05/19/20





  14:15 14:15 14:05


 


Creatine Kinase    48


 


Troponin I   0.049 


 


NT-Pro-B Natriuret Pep  247  











Impressions: 


                                        





Acute Abdomen Series  05/18/20 15:06


IMPRESSION:  Constipation.  No obstruction.


 








Abdomen/Pelvis CT  05/20/20 00:00


IMPRESSION:  Probable right central hilar mass with postobstructive pneumonia.  

Recommend bronchoscopy for further evaluation.  There is pathologic precarinal 

and subcarinal adenopathy.  There is a small right pleural effusion.  Minimal 

right basilar atelectasis.  Stable 3.9 mm nodule in the periphery of the right 

lower lobe.


 


IMPRESSION:  Dilatation of the right collecting system possibly chronic.  No 

stones are identified.  No obstructing lesions.  No evidence of metastatic 

disease in the abdomen or pelvis.


Degenerative changes and postsurgical changes in the lumbar spine.


 








Chest CT  05/20/20 00:00


IMPRESSION:  Probable right central hilar mass with postobstructive pneumonia.  

Recommend bronchoscopy for further evaluation.  There is pathologic precarinal 

and subcarinal adenopathy.  There is a small right pleural effusion.  Minimal 

right basilar atelectasis.  Stable 3.9 mm nodule in the periphery of the right 

lower lobe.


 


IMPRESSION:  Dilatation of the right collecting system possibly chronic.  No 

stones are identified.  No obstructing lesions.  No evidence of metastatic 

disease in the abdomen or pelvis.


Degenerative changes and postsurgical changes in the lumbar spine.


 














Assessment and Plan





- Diagnosis


(1) Hyponatremia with decreased serum osmolality


Is this a current diagnosis for this admission?: Yes   


Plan: 


Her sodium remained stable as long as she is on IV fluids with saline.  Multiple

attempts have been made to get her off IV fluids altogether, but every time we 

take her off either hypertonic saline or isotonic saline her sodium drops.  She 

is retaining a little bit of fluid but we have her meds running as low as we can

to try to keep her sodium close to normal range.








(2) Right lower lobe lung mass


Is this a current diagnosis for this admission?: Yes   


Plan: 


Plan is for transfer to [facility] for endoscopic bronchial 

ultrasound with biopsy of the right hilar mass.  This is felt to be the reason 

why her sodium continues to drop.








(3) Lymphadenopathy, hilar


Is this a current diagnosis for this admission?: Yes   





(4) Maculopapular rash, generalized


Is this a current diagnosis for this admission?: Yes   


Plan: 


ROSEANNE positive.  SSA positive.  SSB negative.


Appears to be steroid responsive, even with a single dose. 


Solu-Medrol to 40 mg IV daily.








(5) Hypokalemia


Is this a current diagnosis for this admission?: Yes   


Plan: 


Resolved








(6) Hypertension


Qualifiers: 


   Hypertension type: essential hypertension   Qualified Code(s): I10 - 

Essential (primary) hypertension   


Is this a current diagnosis for this admission?: Yes   


Plan: 


On carvedilol 25 mg p.o. twice daily, benazepril 10 mg p.o. nightly.











(7) COPD (chronic obstructive pulmonary disease)


Qualifiers: 


   COPD type: chronic bronchitis   Chronic bronchitis type: simple   Qualified 

Code(s): J41.0 - Simple chronic bronchitis   


Is this a current diagnosis for this admission?: Yes   


Plan: 


Not acutely exacerbated








(8) Chronic back pain


Qualifiers: 


   Back pain location: low back pain   Back pain laterality: midline   Sciatica 

presence: without sciatica   Qualified Code(s): M54.5 - Low back pain; G89.29 - 

Other chronic pain   


Is this a current diagnosis for this admission?: Yes   


Plan: 


Her multiple home medications are continued








(9) Hypothyroidism


Qualifiers: 


   Hypothyroidism type: unspecified   Qualified Code(s): E03.9 - Hypothyroidism,

unspecified   


Is this a current diagnosis for this admission?: Yes   


Plan: 


Continue Synthroid








(10) Elevated lipase


Is this a current diagnosis for this admission?: Yes   


Plan: 


This is of uncertain significance.  She is not having any abdominal pain.  CT 

was negative for evidence of acute pancreatitis.  She is eating and drinking 

without difficulty.








(11) Malar rash


Is this a current diagnosis for this admission?: Yes   


Plan: 


Also steroid responsive








(12) Lymphadenopathy, mediastinal


Is this a current diagnosis for this admission?: Yes   





(13) Pleural effusion, right


Is this a current diagnosis for this admission?: Yes   


Plan: 


Thus far too small to tap.








(14) Postobstructive pneumonia


Is this a current diagnosis for this admission?: Yes   


Plan: 


Treated with two 10-day courses of amoxicillin.  Currently, patient is afebrile 

and has no sputum production.  She is on room air without hypoxia.  Chest x-ray 

appears to be showing steady improvement in aeration (perhaps partly owing to 

initiation of steroid therapy).








(15) Hypomagnesemia


Is this a current diagnosis for this admission?: Yes   


Plan: 


Resolved








(16) Dysuria


Is this a current diagnosis for this admission?: Yes   


Plan: 


No evidence of infection on urinalysis








- Time


Time Spent with patient: 25-34 minutes

## 2020-05-26 LAB
ANION GAP SERPL CALC-SCNC: 7 MMOL/L (ref 5–19)
BUN SERPL-MCNC: 23 MG/DL (ref 7–20)
CALCIUM: 9.3 MG/DL (ref 8.4–10.2)
CHLORIDE SERPL-SCNC: 100 MMOL/L (ref 98–107)
CO2 SERPL-SCNC: 27 MMOL/L (ref 22–30)
GLUCOSE SERPL-MCNC: 124 MG/DL (ref 75–110)
POTASSIUM SERPL-SCNC: 3.8 MMOL/L (ref 3.6–5)

## 2020-05-26 RX ADMIN — OXYCODONE HYDROCHLORIDE PRN MG: 5 TABLET ORAL at 10:35

## 2020-05-26 RX ADMIN — BUDESONIDE SCH MG: 0.25 SUSPENSION RESPIRATORY (INHALATION) at 19:41

## 2020-05-26 RX ADMIN — HEPARIN SODIUM SCH: 5000 INJECTION, SOLUTION INTRAVENOUS; SUBCUTANEOUS at 06:43

## 2020-05-26 RX ADMIN — NITROGLYCERIN SCH EACH: 0.4 PATCH TRANSDERMAL at 09:14

## 2020-05-26 RX ADMIN — IPRATROPIUM BROMIDE AND ALBUTEROL SULFATE SCH ML: 2.5; .5 SOLUTION RESPIRATORY (INHALATION) at 02:11

## 2020-05-26 RX ADMIN — IPRATROPIUM BROMIDE AND ALBUTEROL SULFATE SCH ML: 2.5; .5 SOLUTION RESPIRATORY (INHALATION) at 08:03

## 2020-05-26 RX ADMIN — CARVEDILOL SCH MG: 12.5 TABLET, FILM COATED ORAL at 22:04

## 2020-05-26 RX ADMIN — OXYCODONE HYDROCHLORIDE SCH MG: 10 TABLET, FILM COATED, EXTENDED RELEASE ORAL at 22:04

## 2020-05-26 RX ADMIN — SIMVASTATIN SCH MG: 40 TABLET, FILM COATED ORAL at 18:47

## 2020-05-26 RX ADMIN — METHOCARBAMOL SCH MG: 500 TABLET ORAL at 13:35

## 2020-05-26 RX ADMIN — IPRATROPIUM BROMIDE AND ALBUTEROL SULFATE SCH ML: 2.5; .5 SOLUTION RESPIRATORY (INHALATION) at 13:41

## 2020-05-26 RX ADMIN — FAMOTIDINE SCH MG: 10 INJECTION INTRAVENOUS at 22:05

## 2020-05-26 RX ADMIN — MORPHINE SULFATE PRN MG: 10 INJECTION INTRAMUSCULAR; INTRAVENOUS; SUBCUTANEOUS at 20:06

## 2020-05-26 RX ADMIN — LIDOCAINE SCH PATCH: 50 PATCH CUTANEOUS at 09:12

## 2020-05-26 RX ADMIN — MORPHINE SULFATE PRN MG: 10 INJECTION INTRAMUSCULAR; INTRAVENOUS; SUBCUTANEOUS at 07:47

## 2020-05-26 RX ADMIN — LORAZEPAM PRN MG: 2 INJECTION INTRAMUSCULAR; INTRAVENOUS at 13:30

## 2020-05-26 RX ADMIN — BUDESONIDE SCH MG: 0.25 SUSPENSION RESPIRATORY (INHALATION) at 08:03

## 2020-05-26 RX ADMIN — TEMAZEPAM SCH MG: 15 CAPSULE ORAL at 22:05

## 2020-05-26 RX ADMIN — LEVOTHYROXINE SODIUM SCH MG: 25 TABLET ORAL at 09:13

## 2020-05-26 RX ADMIN — Medication SCH: at 06:44

## 2020-05-26 RX ADMIN — HEPARIN SODIUM SCH: 5000 INJECTION, SOLUTION INTRAVENOUS; SUBCUTANEOUS at 13:35

## 2020-05-26 RX ADMIN — ASPIRIN SCH MG: 81 TABLET, COATED ORAL at 09:12

## 2020-05-26 RX ADMIN — DEXAMETHASONE SODIUM PHOSPHATE PRN MG: 10 INJECTION INTRAMUSCULAR; INTRAVENOUS at 09:11

## 2020-05-26 RX ADMIN — MORPHINE SULFATE PRN MG: 10 INJECTION INTRAMUSCULAR; INTRAVENOUS; SUBCUTANEOUS at 11:52

## 2020-05-26 RX ADMIN — HEPARIN SODIUM SCH: 5000 INJECTION, SOLUTION INTRAVENOUS; SUBCUTANEOUS at 21:59

## 2020-05-26 RX ADMIN — Medication SCH: at 18:52

## 2020-05-26 RX ADMIN — METHOCARBAMOL SCH MG: 500 TABLET ORAL at 18:50

## 2020-05-26 RX ADMIN — METHYLPREDNISOLONE SODIUM SUCCINATE SCH MG: 40 INJECTION, POWDER, FOR SOLUTION INTRAMUSCULAR; INTRAVENOUS at 09:13

## 2020-05-26 RX ADMIN — FENOFIBRATE SCH MG: 145 TABLET ORAL at 09:12

## 2020-05-26 RX ADMIN — Medication SCH ML: at 22:05

## 2020-05-26 RX ADMIN — HYDRALAZINE HYDROCHLORIDE PRN MG: 20 INJECTION INTRAMUSCULAR; INTRAVENOUS at 07:51

## 2020-05-26 RX ADMIN — BENAZEPRIL HYDROCHLORIDE SCH MG: 5 TABLET ORAL at 22:04

## 2020-05-26 RX ADMIN — CARVEDILOL SCH MG: 12.5 TABLET, FILM COATED ORAL at 09:13

## 2020-05-26 RX ADMIN — IPRATROPIUM BROMIDE AND ALBUTEROL SULFATE SCH ML: 2.5; .5 SOLUTION RESPIRATORY (INHALATION) at 19:41

## 2020-05-26 RX ADMIN — FAMOTIDINE SCH MG: 10 INJECTION INTRAVENOUS at 09:13

## 2020-05-26 RX ADMIN — METHOCARBAMOL SCH MG: 500 TABLET ORAL at 22:05

## 2020-05-26 RX ADMIN — PREGABALIN SCH MG: 75 CAPSULE ORAL at 09:13

## 2020-05-26 RX ADMIN — OXYCODONE HYDROCHLORIDE SCH MG: 10 TABLET, FILM COATED, EXTENDED RELEASE ORAL at 09:12

## 2020-05-26 RX ADMIN — METHOCARBAMOL SCH MG: 500 TABLET ORAL at 09:12

## 2020-05-26 NOTE — PDOC TRANSFER SUMMARY
General


Admission Date/PCP: 


  05/18/20 18:27





  OLIVA HESS MD





Admission Date: 05/18/20


Transfer Date: 05/26/20


Accepting Facility: [facility]


Resuscitation Status: Full Code





- Transfer Diagnosis


(1) Hyponatremia with decreased serum osmolality


Is this a current diagnosis for this admission?: Yes   





(2) Right lower lobe lung mass


Is this a current diagnosis for this admission?: Yes   





(3) Lymphadenopathy, hilar


Is this a current diagnosis for this admission?: Yes   





(4) Maculopapular rash, generalized


Is this a current diagnosis for this admission?: Yes   





(5) Hypokalemia


Is this a current diagnosis for this admission?: Yes   





(6) Hypertension


Is this a current diagnosis for this admission?: Yes   





(7) COPD (chronic obstructive pulmonary disease)


Is this a current diagnosis for this admission?: Yes   





(8) Chronic back pain


Is this a current diagnosis for this admission?: Yes   





(9) Hypothyroidism


Is this a current diagnosis for this admission?: Yes   





(10) Elevated lipase


Is this a current diagnosis for this admission?: Yes   





(11) Malar rash


Is this a current diagnosis for this admission?: Yes   





(12) Lymphadenopathy, mediastinal


Is this a current diagnosis for this admission?: Yes   





(13) Pleural effusion, right


Is this a current diagnosis for this admission?: Yes   





(14) Postobstructive pneumonia


Is this a current diagnosis for this admission?: Yes   





(15) Hypomagnesemia


Is this a current diagnosis for this admission?: Yes   





(16) Dysuria


Is this a current diagnosis for this admission?: Yes   





- Transfer Medications


Home Medications: 


                                        





Aspirin [Ecotrin] 81 mg PO DAILY 09/18/18 


Benazepril HCl [Lotensin 5 mg Tablet] 10 mg PO QHS 09/18/18 


Carvedilol [Coreg 12.5 mg Tablet] 12.5 mg PO Q12 09/18/18 


Chlorthalidone [Chlorthalidone 25 mg Tablet] 25 mg PO QAM 09/18/18 


Estradiol [Estrace] 1 mg PO QHS 09/18/18 


Fenofibrate Nanocrystallized [Tricor 145 mg Tablet] 145 mg PO DAILY 09/18/18 


Levothyroxine Sodium [Synthroid] 25 mcg PO Q6AM 09/18/18 


Oxycodone HCl [Oxycontin] 20 mg PO Q12 09/18/18 


Oxycodone HCl/Acetaminophen [Percocet 7.5-325 mg Tablet] 1 tab PO Q6HP PRN 

09/18/18 


Polyethylene Glycol 3350 [Miralax Powder 17 gm/Packet] 1 packet PO DAILY 

09/18/18 


Potassium Chloride [Klor-Con M10] 20 meq PO BID 09/18/18 


Pregabalin [Lyrica 75 mg Capsule] 75 mg PO DAILY 09/18/18 


Simvastatin [Zocor 40 mg Tablet] 40 mg PO QPM 09/18/18 


Temazepam [Restoril 15 mg Capsule] 15 mg PO QHS 09/18/18 


Acyclovir [Zovirax 200 mg Capsule] 200 mg PO Q8 05/18/20 


Albuterol Sulfate [Ventolin Hfa 8 gm Mdi] 1 puff IH Q4HP PRN 05/18/20 


Amoxicillin/Potassium Clav [Amox-Clav 875-125 mg Tablet] 1 each PO Q12 MDD FOR 

10 DAYS 05/18/20 


Aspirin [Adult Low Dose Aspirin EC] 81 mg PO DAILY 05/18/20 


Betamethasone Dipropionate 1 applic TOP BID 05/18/20 


Cholecalciferol (Vitamin D3) [Vitamin D3 1000 Unit Tablet] 1,000 unit PO DAILY 

05/18/20 


Clotrimazole/Betamethasone Dip [Lotrisone Cream] 1 applic TOP BID 05/18/20 


Cyanocobalamin (Vitamin B-12) [Vitamin B-12 1000 mcg Tablet] 1,000 mcg PO DAILY 

05/18/20 


Estradiol [Estrace] 1 applic PV MOFR 05/18/20 


Ferrous Sulfate [Feosol 325 mg Tablet] 325 mg PO DAILY 05/18/20 


Lansoprazole 30 mg PO QAM 05/18/20 


Lidocaine [Lidoderm 5% (700 mg) Transdermal Patch] 1 patch TD DAILY 05/18/20 


Methocarbamol [Robaxin 500 mg Tablet] 500 mg PO QID 05/18/20 


Nitroglycerin [Nitro-Dur 10 mg (0.4MG/Hr) Transdermal Patch] 0.4 mg TD DAILY 

05/18/20 


Nitroglycerin [Nitrostat 0.4 mg (1/150 Gr) Tabs 25/Bottle] 0.4 mg SL Q5MP PRN 

05/18/20 


Nystatin [Mycostatin 500,000 Unit/5 ml Susp Udcup] 5 ml PO TID MDD SWISH AND 

SWALLOW 05/18/20 


Ondansetron [Zofran Odt 4 mg Tablet] 4 mg PO Q8HP PRN 05/18/20 


Umeclidinium Brm/Vilanterol Tr [Anoro Ellipta 62.5-25 Mcg INH] 1 puff IH DAILY 

05/18/20 








Transfer Medications: 


                               Current Medications





Acetaminophen (Tylenol 325 Mg Tablet)  325 mg PO Q6HP PRN


   PRN Reason: FOR BACK PAIN


   Stop: 06/17/20 20:57


   Last Admin: 05/25/20 18:17 Dose:  325 mg


   Documented by: 


Albuterol/Ipratropium (Duoneb 3 Ml Ampul)  3 ml NEB RTQ6 MIGUEL ÁNGEL


   Stop: 06/21/20 19:59


   Last Admin: 05/26/20 13:41 Dose:  3 ml


   Documented by: 


Aspirin (Ecotrin 81 Mg Ec Tablet)  81 mg PO DAILY MIGUEL ÁNGEL


   Stop: 06/18/20 09:59


   Last Admin: 05/26/20 09:12 Dose:  81 mg


   Documented by: 


Benazepril HCl (Lotensin 5 Mg Tablet)  10 mg PO QHS MIGUEL ÁNGEL


   Stop: 06/17/20 21:59


   Last Admin: 05/25/20 21:11 Dose:  10 mg


   Documented by: 


Budesonide (Pulmicort Neb 0.25 Mg/2 Ml Ampul)  0.25 mg NEB RTQ12 MIGUEL ÁNGEL


   Stop: 06/21/20 19:59


   Last Admin: 05/26/20 08:03 Dose:  0.25 mg


   Documented by: 


Carvedilol (Coreg 12.5 Mg Tablet)  25 mg PO Q12 MIGUEL ÁNGEL


   Stop: 06/22/20 09:59


   Last Admin: 05/26/20 09:13 Dose:  25 mg


   Documented by: 


Famotidine (Pepcid Inj/Pf 20 Mg/2 Ml Sdv)  20 mg IV Q12 MIGUEL ÁNGEL


   Stop: 06/20/20 21:59


   Last Admin: 05/26/20 09:13 Dose:  20 mg


   Documented by: 


Fenofibrate (Tricor 145 Mg Tablet)  145 mg PO DAILY MIGUEL ÁNGEL


   Stop: 06/18/20 09:59


   Last Admin: 05/26/20 09:12 Dose:  145 mg


   Documented by: 


Heparin Sodium (Porcine) (Heparin Inj 5,000 Units/Ml 1 Ml Vial)  5,000 unit 

SUBCUT Q8 MIGUEL ÁNGEL


   Stop: 06/17/20 21:59


   Last Admin: 05/26/20 13:35 Dose:  Not Given


   Documented by: 


Hydralazine HCl (Apresoline Inj/Pf 20 Mg/1 Ml Sdv)  10 mg IV Q4HP PRN


   PRN Reason: SBP > 160


   Stop: 06/21/20 00:44


   Last Admin: 05/26/20 07:51 Dose:  10 mg


   Documented by: 


Sodium Chloride (Nacl 0.9% 1000 Ml Iv Soln)  1,000 mls @ 40 mls/hr IV CONTINUOUS

PRN


   PRN Reason: THIS MED IS NOT "PRN"


   Stop: 06/23/20 06:14


   Last Admin: 05/25/20 17:03 Dose:  40 mls/hr


   Documented by: 


Levothyroxine Sodium (Synthroid 0.025 Mg Tablet)  0.025 mg PO QAM [facility]


   Stop: 06/18/20 07:59


   Last Admin: 05/26/20 09:13 Dose:  0.025 mg


   Documented by: 


Lidocaine (Lidoderm 5% (700 Mg) Transdermal Patch)  1 patch TP DAILY [facility]


   Stop: 06/19/20 02:14


   Last Admin: 05/26/20 09:12 Dose:  1 patch


   Documented by: 


Lorazepam (Ativan Inj 2 Mg/1 Ml Vial)  1 mg IV Q4HP PRN


   PRN Reason: FOR ANXIETY/AGITATION


   Stop: 06/02/20 14:42


Methocarbamol (Robaxin 500 Mg Tablet)  500 mg PO QID [facility]


   Stop: 06/17/20 21:59


   Last Admin: 05/26/20 13:35 Dose:  500 mg


   Documented by: 


Methylprednisolone Sodium Succinate (Solu-Medrol Inj/Pf 40 Mg/1 Ml Sdv)  40 mg 

IV DAILY [facility]


   Stop: 06/22/20 09:59


   Last Admin: 05/26/20 09:13 Dose:  40 mg


   Documented by: 


Morphine Sulfate (Morphine 10 Mg/Ml Inj)  2 mg IV Q4HP PRN


   PRN Reason: FOR PAIN


   Stop: 05/27/20 08:18


   Last Admin: 05/26/20 11:52 Dose:  2 mg


   Documented by: 


Nitroglycerin (Nitro-Dur 10 Mg (0.4mg/Hr) Transdermal Patch)  1 each TD DAILY 

[facility]


   Stop: 06/18/20 09:59


   Last Admin: 05/26/20 09:14 Dose:  1 each


   Documented by: 


Nitroglycerin (Nitrostat 0.4 Mg (1/150 Gr) Tabs 25/Bottle)  1 tab SL Q5MP PRN


   PRN Reason: FOR CHEST PAIN


   Stop: 06/17/20 21:48


Ondansetron HCl (Zofran Inj/Pf 4 Mg/2 Ml Sdv)  4 mg IV Q8HP PRN


   PRN Reason: FOR NAUSEA/VOMITING


   Stop: 06/17/20 17:42


   Last Admin: 05/26/20 09:11 Dose:  4 mg


   Documented by: 


Oxycodone HCl (Oxycontin Sr 10 Mg Tablet)  20 mg PO Q12 MIGUEL ÁNGEL


   Stop: 06/02/20 20:29


   Last Admin: 05/26/20 09:12 Dose:  20 mg


   Documented by: 


Oxycodone HCl (Oxy-Ir 5 Mg Tablet)  7.5 mg PO Q6HP PRN


   PRN Reason: FOR BACK PAIN


   Stop: 06/02/20 14:41


Pregabalin (Lyrica 75 Mg Capsule)  75 mg PO DAILY MIGUEL ÁNGEL


   Stop: 06/18/20 09:59


   Last Admin: 05/26/20 09:13 Dose:  75 mg


   Documented by: 


Simvastatin (Zocor 40 Mg Tablet)  80 mg PO QPM MIGUEL ÁNGEL


   Stop: 06/22/20 17:59


   Last Admin: 05/25/20 17:03 Dose:  80 mg


   Documented by: 


Sodium Chloride (Saline Flush 2.5 Ml Monoject Prefil Syrin)  2.5 ml IV Q8 MIGUEL ÁNGEL


   Stop: 06/17/20 21:59


   Last Admin: 05/26/20 06:44 Dose:  Not Given


   Documented by: 


Temazepam (Restoril 15 Mg Capsule)  15 mg PO QHS MIGUEL ÁNGEL


   Stop: 06/01/20 21:59


   Last Admin: 05/25/20 23:29 Dose:  15 mg


   Documented by: 











- Allergies


Allergies/Adverse Reactions: 


                                        





levofloxacin [From Levaquin] Allergy (Severe, Verified 05/19/20 08:50)


   Tachycardia


gabapentin [From Neurontin] Allergy (Intermediate, Verified 05/19/20 08:50)


   CONFUSED/TREMORS


polymyxin B sulfate [From Polysporin] Allergy (Mild, Verified 05/19/20 08:50)


   IRRITATION, HIVES











Hospital Course


Hospital Course: 





This 74-year-old female non-smoker presented to [facility] 

emergency department with generalized weakness, associated with decreased 

appetite recurrent emesis and shortness of breath.  The patient reports she has 

been undergoing treatment for "pneumonia" for approximately 2 weeks.  She 

completed a 10-day course of amoxicillin.  However, she was found to have 

"persistent pneumonia" and was started on another 10-day course of amoxicillin 2

days ago.  Patient reports that she has been having emesis off and on for the 

past 2 weeks.  No known sick contacts.





In the emergency department, the patient's serum sodium was found to be 111.  Of

note, she does have a history of congestive heart failure and hypothyroidism.  

She also uses chlorthalidone for hypertension.  Again, she also has recently 

been diagnosed with "pneumonia".





Patient was placed on hypertonic saline, and it took several days to get her 

sodium levels corrected.  At different intervals, she was taken off of 

hypertonic saline, and every time this was done, repeat sodium check which show 

that her sodium levels were dropping again.  Once her sodium level was corrected

to the normal range, she was put on normal saline.  When normal saline was 

discontinued, her sodium levels would drop.  She has to be on a continuous 

infusion just keep her sodium levels in the normal range.  This is believed to 

be due to her right hilar mass.





The mass in the right hilum was responsible for postobstructive pneumonia.  She 

has completed a course of treatment for this and is now comfortable on room air 

without coughing.  Because of its location, it would need an endoscopic 

bronchial ultrasound for biopsy.  However, the capacity to do that does not 

exist at this facility.  We could schedule her to have this done as an 

outpatient, but her sodium levels will drop before she would be able to get this

done and she would wind up back in the hospital.





She is also had a malar and maculopapular rash that has responded to steroid 

therapy.  The patient denies a history of lupus.  Her ROSEANNE and SS-A antibodies 

were positive.  She is currently on Solu-Medrol IV once a day, and can probably 

be transitioned to prednisone pending a definitive diagnosis.





She had a mildly elevated lipase, and the significance of this was uncertain 

because the patient has had no abdominal pain and has been eating and drinking 

without difficulty.  No abnormalities on the abdominal CT were noted that would 

explain this phenomenon.





She has complained of some dysuria but her urine culture has been negative.





Other than her sodium, she had some mild disturbances of her potassium and 

magnesium, both of which have been corrected.





She has, of course, tested negative for coronavirus, twice at this facility, 

once on admission and then again yesterday.





She has been accepted in transfer by the hospitalist service at [facility].  Bed 

confirmation is pending.





Physical Exam


Vital Signs: 


                                        











Temp Pulse Resp BP Pulse Ox


 


 98.2 F   67   16   153/72 H  98 


 


 05/26/20 16:00  05/26/20 16:00  05/26/20 16:00  05/26/20 16:00  05/26/20 16:00








                                 Intake & Output











 05/25/20 05/26/20 05/27/20





 06:59 06:59 06:59


 


Intake Total 240 2000 260


 


Output Total 160 400 


 


Balance 80 1600 260


 


Weight 74.5 kg 74.5 kg 74.5 kg








General appearance: PRESENT: no acute distress, cooperative, disheveled


Respiratory exam: PRESENT: crackles - Faint bibasilar, symmetrical, unlabored.  

ABSENT: accessory muscle use, chest wall tenderness, prolonged expiratory phas, 

rhonchi, tachypnea, wheezes


Cardiovascular exam: PRESENT: RRR, +S1, +S2


Pulses: PRESENT: normal carotid pulses


Vascular exam: PRESENT: normal capillary refill


GI/Abdominal exam: PRESENT: normal bowel sounds, soft.  ABSENT: distended, 

guarding, rebound, tenderness


Extremities exam: PRESENT: pedal edema, +1 edema.  ABSENT: clubbing


Musculoskeletal exam: PRESENT: normal inspection.  ABSENT: deformity


Neurological exam: PRESENT: alert, awake, oriented to person, oriented to place,

oriented to situation


Psychiatric exam: PRESENT: flat affect


Skin exam: PRESENT: dry, warm





Results


Laboratory Results: 


                                        





                                 05/20/20 05:10 





                                 05/26/20 12:33 





                                        











  05/26/20





  12:33


 


Sodium  133.8 L


 


Potassium  3.8


 


Chloride  100


 


Carbon Dioxide  27


 


Anion Gap  7


 


BUN  23 H


 


Creatinine  0.71


 


Est GFR (African Amer)  > 60


 


Glucose  124 H


 


Calcium  9.3








                                        











  05/18/20 05/18/20 05/19/20





  14:15 14:15 14:05


 


Creatine Kinase    48


 


Troponin I   0.049 


 


NT-Pro-B Natriuret Pep  247  











Impressions: 


                                        





Acute Abdomen Series  05/18/20 15:06


IMPRESSION:  Constipation.  No obstruction.


 








Abdomen/Pelvis CT  05/20/20 00:00


IMPRESSION:  Probable right central hilar mass with postobstructive pneumonia.  

Recommend bronchoscopy for further evaluation.  There is pathologic precarinal 

and subcarinal adenopathy.  There is a small right pleural effusion.  Minimal 

right basilar atelectasis.  Stable 3.9 mm nodule in the periphery of the right 

lower lobe.


 


IMPRESSION:  Dilatation of the right collecting system possibly chronic.  No 

stones are identified.  No obstructing lesions.  No evidence of metastatic 

disease in the abdomen or pelvis.


Degenerative changes and postsurgical changes in the lumbar spine.


 








Chest CT  05/20/20 00:00


IMPRESSION:  Probable right central hilar mass with postobstructive pneumonia.  

Recommend bronchoscopy for further evaluation.  There is pathologic precarinal 

and subcarinal adenopathy.  There is a small right pleural effusion.  Minimal 

right basilar atelectasis.  Stable 3.9 mm nodule in the periphery of the right 

lower lobe.


 


IMPRESSION:  Dilatation of the right collecting system possibly chronic.  No 

stones are identified.  No obstructing lesions.  No evidence of metastatic 

disease in the abdomen or pelvis.


Degenerative changes and postsurgical changes in the lumbar spine.


 














Plan


Time Spent: Greater than 30 Minutes

## 2020-05-27 VITALS — SYSTOLIC BLOOD PRESSURE: 175 MMHG | DIASTOLIC BLOOD PRESSURE: 76 MMHG

## 2020-05-27 RX ADMIN — LORAZEPAM PRN MG: 2 INJECTION INTRAMUSCULAR; INTRAVENOUS at 20:09

## 2020-05-27 RX ADMIN — LEVOTHYROXINE SODIUM SCH MG: 25 TABLET ORAL at 08:13

## 2020-05-27 RX ADMIN — HEPARIN SODIUM SCH: 5000 INJECTION, SOLUTION INTRAVENOUS; SUBCUTANEOUS at 15:25

## 2020-05-27 RX ADMIN — CARVEDILOL SCH MG: 12.5 TABLET, FILM COATED ORAL at 09:24

## 2020-05-27 RX ADMIN — HYDRALAZINE HYDROCHLORIDE PRN MG: 20 INJECTION INTRAMUSCULAR; INTRAVENOUS at 00:14

## 2020-05-27 RX ADMIN — HEPARIN SODIUM SCH: 5000 INJECTION, SOLUTION INTRAVENOUS; SUBCUTANEOUS at 05:05

## 2020-05-27 RX ADMIN — ASPIRIN SCH MG: 81 TABLET, COATED ORAL at 09:24

## 2020-05-27 RX ADMIN — OXYCODONE HYDROCHLORIDE PRN MG: 5 TABLET ORAL at 04:41

## 2020-05-27 RX ADMIN — BUDESONIDE SCH MG: 0.25 SUSPENSION RESPIRATORY (INHALATION) at 20:30

## 2020-05-27 RX ADMIN — TEMAZEPAM SCH MG: 15 CAPSULE ORAL at 21:26

## 2020-05-27 RX ADMIN — HYDRALAZINE HYDROCHLORIDE PRN MG: 20 INJECTION INTRAMUSCULAR; INTRAVENOUS at 20:06

## 2020-05-27 RX ADMIN — IPRATROPIUM BROMIDE AND ALBUTEROL SULFATE SCH ML: 2.5; .5 SOLUTION RESPIRATORY (INHALATION) at 02:14

## 2020-05-27 RX ADMIN — FAMOTIDINE SCH MG: 10 INJECTION INTRAVENOUS at 09:25

## 2020-05-27 RX ADMIN — DEXAMETHASONE SODIUM PHOSPHATE PRN MG: 10 INJECTION INTRAMUSCULAR; INTRAVENOUS at 20:08

## 2020-05-27 RX ADMIN — Medication SCH: at 05:05

## 2020-05-27 RX ADMIN — OXYCODONE HYDROCHLORIDE SCH MG: 10 TABLET, FILM COATED, EXTENDED RELEASE ORAL at 21:26

## 2020-05-27 RX ADMIN — IPRATROPIUM BROMIDE AND ALBUTEROL SULFATE SCH ML: 2.5; .5 SOLUTION RESPIRATORY (INHALATION) at 13:54

## 2020-05-27 RX ADMIN — SODIUM CHLORIDE PRN MLS/HR: 9 INJECTION, SOLUTION INTRAVENOUS at 00:13

## 2020-05-27 RX ADMIN — Medication SCH ML: at 21:31

## 2020-05-27 RX ADMIN — HEPARIN SODIUM SCH: 5000 INJECTION, SOLUTION INTRAVENOUS; SUBCUTANEOUS at 21:23

## 2020-05-27 RX ADMIN — SIMVASTATIN SCH MG: 40 TABLET, FILM COATED ORAL at 17:20

## 2020-05-27 RX ADMIN — METHYLPREDNISOLONE SODIUM SUCCINATE SCH MG: 40 INJECTION, POWDER, FOR SOLUTION INTRAMUSCULAR; INTRAVENOUS at 09:25

## 2020-05-27 RX ADMIN — BENAZEPRIL HYDROCHLORIDE SCH MG: 5 TABLET ORAL at 21:29

## 2020-05-27 RX ADMIN — METHOCARBAMOL SCH MG: 500 TABLET ORAL at 21:30

## 2020-05-27 RX ADMIN — OXYCODONE HYDROCHLORIDE PRN MG: 5 TABLET ORAL at 20:03

## 2020-05-27 RX ADMIN — PREGABALIN SCH MG: 75 CAPSULE ORAL at 09:24

## 2020-05-27 RX ADMIN — FAMOTIDINE SCH MG: 10 INJECTION INTRAVENOUS at 21:26

## 2020-05-27 RX ADMIN — METHOCARBAMOL SCH MG: 500 TABLET ORAL at 15:40

## 2020-05-27 RX ADMIN — METHOCARBAMOL SCH MG: 500 TABLET ORAL at 09:24

## 2020-05-27 RX ADMIN — Medication SCH: at 15:25

## 2020-05-27 RX ADMIN — MORPHINE SULFATE PRN MG: 10 INJECTION INTRAMUSCULAR; INTRAVENOUS; SUBCUTANEOUS at 00:13

## 2020-05-27 RX ADMIN — BUDESONIDE SCH MG: 0.25 SUSPENSION RESPIRATORY (INHALATION) at 08:02

## 2020-05-27 RX ADMIN — IPRATROPIUM BROMIDE AND ALBUTEROL SULFATE SCH ML: 2.5; .5 SOLUTION RESPIRATORY (INHALATION) at 08:02

## 2020-05-27 RX ADMIN — FENOFIBRATE SCH MG: 145 TABLET ORAL at 09:24

## 2020-05-27 RX ADMIN — NITROGLYCERIN SCH EACH: 0.4 PATCH TRANSDERMAL at 09:25

## 2020-05-27 RX ADMIN — CARVEDILOL SCH MG: 12.5 TABLET, FILM COATED ORAL at 21:26

## 2020-05-27 RX ADMIN — OXYCODONE HYDROCHLORIDE SCH MG: 10 TABLET, FILM COATED, EXTENDED RELEASE ORAL at 09:25

## 2020-05-27 RX ADMIN — METHOCARBAMOL SCH MG: 500 TABLET ORAL at 17:20

## 2020-05-27 RX ADMIN — LIDOCAINE SCH PATCH: 50 PATCH CUTANEOUS at 09:23

## 2020-05-27 RX ADMIN — LORAZEPAM PRN MG: 2 INJECTION INTRAMUSCULAR; INTRAVENOUS at 04:42

## 2020-05-27 RX ADMIN — IPRATROPIUM BROMIDE AND ALBUTEROL SULFATE SCH ML: 2.5; .5 SOLUTION RESPIRATORY (INHALATION) at 20:29

## 2020-06-07 ENCOUNTER — HOSPITAL ENCOUNTER (INPATIENT)
Dept: HOSPITAL 62 - ER | Age: 75
LOS: 11 days | Discharge: HOME HEALTH | DRG: 92 | End: 2020-06-18
Attending: INTERNAL MEDICINE | Admitting: FAMILY MEDICINE
Payer: MEDICARE

## 2020-06-07 DIAGNOSIS — R78.81: ICD-10-CM

## 2020-06-07 DIAGNOSIS — E03.9: ICD-10-CM

## 2020-06-07 DIAGNOSIS — C34.01: ICD-10-CM
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
## 2020-06-07 DIAGNOSIS — J41.0: ICD-10-CM

## 2020-06-07 DIAGNOSIS — Z20.828: ICD-10-CM

## 2020-06-07 DIAGNOSIS — N17.9: ICD-10-CM

## 2020-06-07 DIAGNOSIS — B96.20: ICD-10-CM

## 2020-06-07 DIAGNOSIS — G92: Primary | ICD-10-CM

## 2020-06-07 DIAGNOSIS — E86.0: ICD-10-CM

## 2020-06-07 DIAGNOSIS — M54.9: ICD-10-CM

## 2020-06-07 DIAGNOSIS — Z79.899: ICD-10-CM

## 2020-06-07 DIAGNOSIS — E87.6: ICD-10-CM

## 2020-06-07 DIAGNOSIS — Z79.890: ICD-10-CM

## 2020-06-07 DIAGNOSIS — Z88.1: ICD-10-CM

## 2020-06-07 DIAGNOSIS — M19.90: ICD-10-CM

## 2020-06-07 DIAGNOSIS — B95.2: ICD-10-CM

## 2020-06-07 DIAGNOSIS — N39.0: ICD-10-CM

## 2020-06-07 DIAGNOSIS — Z95.1: ICD-10-CM

## 2020-06-07 DIAGNOSIS — I11.0: ICD-10-CM

## 2020-06-07 DIAGNOSIS — Z95.810: ICD-10-CM

## 2020-06-07 DIAGNOSIS — E83.42: ICD-10-CM

## 2020-06-07 DIAGNOSIS — I50.9: ICD-10-CM

## 2020-06-07 DIAGNOSIS — E78.5: ICD-10-CM

## 2020-06-07 DIAGNOSIS — E22.2: ICD-10-CM

## 2020-06-07 DIAGNOSIS — I48.91: ICD-10-CM

## 2020-06-07 LAB
ABSOLUTE LYMPHOCYTES# (MANUAL): 0.6 10^3/UL (ref 0.5–4.7)
ABSOLUTE MONOCYTES # (MANUAL): 0.7 10^3/UL (ref 0.1–1.4)
ADD MANUAL DIFF: YES
ALBUMIN SERPL-MCNC: 3.2 G/DL (ref 3.5–5)
ALP SERPL-CCNC: 48 U/L (ref 38–126)
ANION GAP SERPL CALC-SCNC: 9 MMOL/L (ref 5–19)
APPEARANCE UR: (no result)
APTT PPP: (no result) S
AST SERPL-CCNC: 73 U/L (ref 14–36)
BARBITURATES UR QL SCN: NEGATIVE
BASOPHILS NFR BLD MANUAL: 0 % (ref 0–2)
BILIRUB DIRECT SERPL-MCNC: 0.5 MG/DL (ref 0–0.4)
BILIRUB SERPL-MCNC: 1.2 MG/DL (ref 0.2–1.3)
BILIRUB UR QL STRIP: (no result)
BUN SERPL-MCNC: 29 MG/DL (ref 7–20)
CALCIUM: 10.1 MG/DL (ref 8.4–10.2)
CHLORIDE SERPL-SCNC: 93 MMOL/L (ref 98–107)
CO2 SERPL-SCNC: 29 MMOL/L (ref 22–30)
EOSINOPHIL NFR BLD MANUAL: 0 % (ref 0–6)
ERYTHROCYTE [DISTWIDTH] IN BLOOD BY AUTOMATED COUNT: 13.6 % (ref 11.5–14)
ETHANOL SERPL-MCNC: < 10 MG/DL
GLUCOSE SERPL-MCNC: 119 MG/DL (ref 75–110)
GLUCOSE UR STRIP-MCNC: NEGATIVE MG/DL
HCT VFR BLD CALC: 29.7 % (ref 36–47)
HGB BLD-MCNC: 10 G/DL (ref 12–15.5)
KETONES UR STRIP-MCNC: NEGATIVE MG/DL
MCH RBC QN AUTO: 31.4 PG (ref 27–33.4)
MCHC RBC AUTO-ENTMCNC: 33.7 G/DL (ref 32–36)
MCV RBC AUTO: 93 FL (ref 80–97)
METHADONE UR QL SCN: NEGATIVE
MONOCYTES % (MANUAL): 6 % (ref 3–13)
PCP UR QL SCN: NEGATIVE
PH UR STRIP: 5 [PH] (ref 5–9)
PLATELET # BLD: 260 10^3/UL (ref 150–450)
PLATELET COMMENT: ADEQUATE
POTASSIUM SERPL-SCNC: 3.6 MMOL/L (ref 3.6–5)
PROT SERPL-MCNC: 6.3 G/DL (ref 6.3–8.2)
PROT UR STRIP-MCNC: 100 MG/DL
RBC # BLD AUTO: 3.19 10^6/UL (ref 3.72–5.28)
RBC MORPH BLD: (no result)
SEGMENTED NEUTROPHILS % (MAN): 89 % (ref 42–78)
SP GR UR STRIP: 1.02
TOTAL CELLS COUNTED BLD: 100
URINE AMPHETAMINES SCREEN: NEGATIVE
URINE BENZODIAZEPINES SCREEN: NEGATIVE
URINE COCAINE SCREEN: NEGATIVE
URINE MARIJUANA (THC) SCREEN: NEGATIVE
UROBILINOGEN UR-MCNC: 4 MG/DL (ref ?–2)
VARIANT LYMPHS NFR BLD MANUAL: 5 % (ref 13–45)
WBC # BLD AUTO: 11.6 10^3/UL (ref 4–10.5)

## 2020-06-07 PROCEDURE — 01922 ANES N-INVAS IMG/RADJ THER: CPT

## 2020-06-07 PROCEDURE — 36415 COLL VENOUS BLD VENIPUNCTURE: CPT

## 2020-06-07 PROCEDURE — 87040 BLOOD CULTURE FOR BACTERIA: CPT

## 2020-06-07 PROCEDURE — 36573 INSJ PICC RS&I 5 YR+: CPT

## 2020-06-07 PROCEDURE — 85025 COMPLETE CBC W/AUTO DIFF WBC: CPT

## 2020-06-07 PROCEDURE — 87077 CULTURE AEROBIC IDENTIFY: CPT

## 2020-06-07 PROCEDURE — 83735 ASSAY OF MAGNESIUM: CPT

## 2020-06-07 PROCEDURE — 87186 SC STD MICRODIL/AGAR DIL: CPT

## 2020-06-07 PROCEDURE — 85027 COMPLETE CBC AUTOMATED: CPT

## 2020-06-07 PROCEDURE — 83605 ASSAY OF LACTIC ACID: CPT

## 2020-06-07 PROCEDURE — 93325 DOPPLER ECHO COLOR FLOW MAPG: CPT

## 2020-06-07 PROCEDURE — S0119 ONDANSETRON 4 MG: HCPCS

## 2020-06-07 PROCEDURE — C9803 HOPD COVID-19 SPEC COLLECT: HCPCS

## 2020-06-07 PROCEDURE — 96365 THER/PROPH/DIAG IV INF INIT: CPT

## 2020-06-07 PROCEDURE — 81001 URINALYSIS AUTO W/SCOPE: CPT

## 2020-06-07 PROCEDURE — 96375 TX/PRO/DX INJ NEW DRUG ADDON: CPT

## 2020-06-07 PROCEDURE — 80048 BASIC METABOLIC PNL TOTAL CA: CPT

## 2020-06-07 PROCEDURE — 93005 ELECTROCARDIOGRAM TRACING: CPT

## 2020-06-07 PROCEDURE — 93312 ECHO TRANSESOPHAGEAL: CPT

## 2020-06-07 PROCEDURE — 87088 URINE BACTERIA CULTURE: CPT

## 2020-06-07 PROCEDURE — 80053 COMPREHEN METABOLIC PANEL: CPT

## 2020-06-07 PROCEDURE — 99291 CRITICAL CARE FIRST HOUR: CPT

## 2020-06-07 PROCEDURE — 94640 AIRWAY INHALATION TREATMENT: CPT

## 2020-06-07 PROCEDURE — 70450 CT HEAD/BRAIN W/O DYE: CPT

## 2020-06-07 PROCEDURE — 99140 ANES COMP EMERGENCY COND: CPT

## 2020-06-07 PROCEDURE — 87150 DNA/RNA AMPLIFIED PROBE: CPT

## 2020-06-07 PROCEDURE — 82962 GLUCOSE BLOOD TEST: CPT

## 2020-06-07 PROCEDURE — 71045 X-RAY EXAM CHEST 1 VIEW: CPT

## 2020-06-07 PROCEDURE — 87635 SARS-COV-2 COVID-19 AMP PRB: CPT

## 2020-06-07 PROCEDURE — 93010 ELECTROCARDIOGRAM REPORT: CPT

## 2020-06-07 PROCEDURE — 80307 DRUG TEST PRSMV CHEM ANLYZR: CPT

## 2020-06-07 PROCEDURE — 87086 URINE CULTURE/COLONY COUNT: CPT

## 2020-06-07 RX ADMIN — Medication SCH: at 21:30

## 2020-06-07 RX ADMIN — SODIUM CHLORIDE PRN MLS/HR: 9 INJECTION, SOLUTION INTRAVENOUS at 21:30

## 2020-06-07 RX ADMIN — HEPARIN SODIUM SCH UNIT: 5000 INJECTION, SOLUTION INTRAVENOUS; SUBCUTANEOUS at 21:30

## 2020-06-07 RX ADMIN — Medication SCH ML: at 17:29

## 2020-06-07 NOTE — RADIOLOGY REPORT (SQ)
EXAM DESCRIPTION:  CHEST SINGLE VIEW



IMAGES COMPLETED DATE/TIME:  6/7/2020 3:26 pm



REASON FOR STUDY:  ams



COMPARISON:  5/24/2020



TECHNIQUE:  Single frontal radiographic view of the chest acquired.



NUMBER OF VIEWS:  One view.



LIMITATIONS:  None.



FINDINGS:  LUNGS AND PLEURA: No pneumothorax.  Similar right parahilar mass and postobstructive guerra
es -airspace disease in the right mid lung.  No significant pleural effusion.

MEDIASTINUM AND HILAR STRUCTURES: Stable.

HEART AND VASCULAR STRUCTURES: Stable.

BONES: No acute findings.

HARDWARE: Cardiac defibrillator.

OTHER: No other significant finding.



IMPRESSION:  Similar right parahilar mass and postobstructive changes -airspace disease in the right 
mid lung.



TECHNICAL DOCUMENTATION:  JOB ID:  [id number]

[id number]

 2011 [employer]- All Rights Reserved



Reading location - IP/workstation name: [email or web address]

## 2020-06-07 NOTE — PDOC H&P
History of Present Illness


Admission Date/PCP: 


  





  OLIVA HESS MD





History of Present Illness: 


MELISSA BATISTA is a 74 year old female with a history of lung cancer who was 

recently in this facility for hyponatremia and the lung mass was diagnosed at 

that time and determined to be the cause of the hyponatremia.  She was then 

transferred to [facility] where she get endoscopic bronchial ultrasound for biopsy.  

She has since been discharged home.  She was discharged on a fair amount of pain

medication.  She was brought in today at home for decreased responsiveness and 

lethargy.  In the ER she was a little bit dehydrated with a small elevation in 

her creatinine, and she was arousable but would immediately go back to sleep.  I

gave her a very small dose of Narcan and she responded to it.








Past Medical History


Cardiac Medical History: Reports: Atrial Fibrillation, Congestive Heart Failure,

Hyperlipidema, Hypertension - MEDS


   Denies: Myocardial Infarction


Pulmonary Medical History: Reports: Chronic Obstructive Pulmonary Disease (COPD)


   Denies: Asthma


Neurological Medical History: 


   Denies: Seizures


Endocrine Medical History: Reports: Hypothyroidism


GI Medical History: Reports: Gastroesophageal Reflux Disease, Hiatal Hernia - 

YEARS AGO


   Denies: Hepatitis


Musculoskeltal Medical History: Reports: Arthritis


Psychiatric Medical History: 


   Denies: Depression


Hematology: 


   Denies: Anemia, Sickle Cell Disease





Past Surgical History


Past Surgical History: Reports: Cardiac Catheterization, Hysterectomy, Internal 

Defibrillator, Orthopedic Surgery - BACK, Pacemaker - 2008, Tonsillectomy, Other

- Back surgery Aortofemoral bypass


   Denies: Amputation, Mastectomy





Social History


Smoking Status: Unknown if Ever Smoked


Frequency of Alcohol Use: None


Hx Recreational Drug Use: No


Hx Prescription Drug Abuse: No





Family History


Family History: Reviewed & Not Pertinent


Parental Family History Reviewed: No - Unable to obtain


Children Family History Reviewed: No - Unable to obtain


Sibling(s) Family History Reviewed.: No - Unable to obtain





Medication/Allergy


Home Medications: 








Aspirin [Ecotrin] 81 mg PO DAILY 09/18/18 


Benazepril HCl [Lotensin 5 mg Tablet] 10 mg PO QHS 09/18/18 


Carvedilol [Coreg 12.5 mg Tablet] 12.5 mg PO Q12 09/18/18 


Chlorthalidone [Chlorthalidone 25 mg Tablet] 25 mg PO QAM 09/18/18 


Estradiol [Estrace] 1 mg PO QHS 09/18/18 


Fenofibrate Nanocrystallized [Tricor 145 mg Tablet] 145 mg PO DAILY 09/18/18 


Levothyroxine Sodium [Synthroid] 25 mcg PO Q6AM 09/18/18 


Oxycodone HCl [Oxycontin] 20 mg PO Q12 09/18/18 


Oxycodone HCl/Acetaminophen [Percocet 7.5-325 mg Tablet] 1 tab PO Q6HP PRN 

09/18/18 


Polyethylene Glycol 3350 [Miralax Powder 17 gm/Packet] 1 packet PO DAILY 

09/18/18 


Potassium Chloride [Klor-Con M10] 20 meq PO BID 09/18/18 


Pregabalin [Lyrica 75 mg Capsule] 75 mg PO DAILY 09/18/18 


Simvastatin [Zocor 40 mg Tablet] 40 mg PO QPM 09/18/18 


Temazepam [Restoril 15 mg Capsule] 15 mg PO QHS 09/18/18 


Acyclovir [Zovirax 200 mg Capsule] 200 mg PO Q8 05/18/20 


Albuterol Sulfate [Ventolin Hfa 8 gm Mdi] 1 puff IH Q4HP PRN 05/18/20 


Amoxicillin/Potassium Clav [Amox-Clav 875-125 mg Tablet] 1 each PO Q12 MDD FOR 

10 DAYS 05/18/20 


Aspirin [Adult Low Dose Aspirin EC] 81 mg PO DAILY 05/18/20 


Betamethasone Dipropionate 1 applic TOP BID 05/18/20 


Cholecalciferol (Vitamin D3) [Vitamin D3 1000 Unit Tablet] 1,000 unit PO DAILY 

05/18/20 


Clotrimazole/Betamethasone Dip [Lotrisone Cream] 1 applic TOP BID 05/18/20 


Cyanocobalamin (Vitamin B-12) [Vitamin B-12 1000 mcg Tablet] 1,000 mcg PO DAILY 

05/18/20 


Estradiol [Estrace] 1 applic PV MOFR 05/18/20 


Ferrous Sulfate [Feosol 325 mg Tablet] 325 mg PO DAILY 05/18/20 


Lansoprazole 30 mg PO QAM 05/18/20 


Lidocaine [Lidoderm 5% (700 mg) Transdermal Patch] 1 patch TD DAILY 05/18/20 


Methocarbamol [Robaxin 500 mg Tablet] 500 mg PO QID 05/18/20 


Nitroglycerin [Nitro-Dur 10 mg (0.4MG/Hr) Transdermal Patch] 0.4 mg TD DAILY 

05/18/20 


Nitroglycerin [Nitrostat 0.4 mg (1/150 Gr) Tabs 25/Bottle] 0.4 mg SL Q5MP PRN 

05/18/20 


Nystatin [Mycostatin 500,000 Unit/5 ml Susp Udcup] 5 ml PO TID MDD SWISH AND S

WALLOW 05/18/20 


Ondansetron [Zofran Odt 4 mg Tablet] 4 mg PO Q8HP PRN 05/18/20 


Umeclidinium Brm/Vilanterol Tr [Anoro Ellipta 62.5-25 Mcg INH] 1 puff IH DAILY 

05/18/20 








Allergies/Adverse Reactions: 


                                        





levofloxacin [From Levaquin] Allergy (Severe, Verified 06/07/20 14:22)


   Tachycardia


gabapentin [From Neurontin] Allergy (Intermediate, Verified 06/07/20 14:22)


   CONFUSED/TREMORS


polymyxin B sulfate [From Polysporin] Allergy (Mild, Verified 06/07/20 14:22)


   IRRITATION, HIVES











Review of Systems


ROS unobtainable: Due to mental status





Physical Exam


Vital Signs: 


                                        











Temp Pulse Resp BP Pulse Ox


 


 98.8 F   73   14   129/50 H  91 L


 


 06/07/20 17:00  06/07/20 14:05  06/07/20 16:01  06/07/20 16:01  06/07/20 16:01








                                 Intake & Output











 06/06/20 06/07/20 06/08/20





 06:59 06:59 06:59


 


Weight   70.1 kg











General appearance: PRESENT: no acute distress, cooperative, disheveled, other -

Somnolent but arousable


Head exam: PRESENT: atraumatic, normocephalic


Eye exam: PRESENT: EOMI, PERRLA - Sluggish.  ABSENT: conjunctival injection, 

nystagmus, scleral icterus


Ear exam: PRESENT: normal external ear exam


Mouth exam: PRESENT: dry mucosa, neck supple


Teeth exam: PRESENT: poor dentation


Throat exam: ABSENT: post pharyngeal erythema


Neck exam: PRESENT: full ROM.  ABSENT: carotid bruit, JVD, lymphadenopathy, 

meningismus, tenderness, thyromegaly


Respiratory exam: PRESENT: clear to auscultation sancho, symmetrical, unlabored.  

ABSENT: accessory muscle use, chest wall tenderness, crackles, prolonged 

expiratory phas, rhonchi, tachypnea, wheezes


Cardiovascular exam: PRESENT: RRR - Ventricular paced rhythm on the monitor, 

+S1, +S2


Pulses: PRESENT: normal carotid pulses


Vascular exam: PRESENT: normal capillary refill


GI/Abdominal exam: PRESENT: normal bowel sounds, soft.  ABSENT: distended, 

guarding, rebound, tenderness


Extremities exam: ABSENT: clubbing, pedal edema


Musculoskeletal exam: PRESENT: normal inspection.  ABSENT: deformity


Neurological exam: PRESENT: awake - Drowsy but arousable, oriented to person, 

oriented to place.  ABSENT: motor sensory deficit


Psychiatric exam: PRESENT: flat affect


Skin exam: PRESENT: dry, warm





Results


Laboratory Results: 


                                        





                                 06/07/20 14:20 





                                 06/07/20 14:20 





                                        











  06/07/20 06/07/20 06/07/20





  14:20 14:20 14:20


 


WBC  11.6 H  


 


RBC  3.19 L  


 


Hgb  10.0 L  


 


Hct  29.7 L  


 


MCV  93  


 


MCH  31.4  


 


MCHC  33.7  


 


RDW  13.6  


 


Plt Count  260  


 


Seg Neutrophils %  Not Reportable  


 


Sodium   131.1 L 


 


Potassium   3.6 


 


Chloride   93 L 


 


Carbon Dioxide   29 


 


Anion Gap   9 


 


BUN   29 H 


 


Creatinine   1.62 H 


 


Est GFR ( Amer)   37 L 


 


Glucose   119 H 


 


Lactic Acid    1.2


 


Calcium   10.1 


 


Magnesium   1.5 L 


 


Total Bilirubin   1.2 


 


AST   73 H 


 


Alkaline Phosphatase   48 


 


Total Protein   6.3 


 


Albumin   3.2 L 


 


Urine Color   


 


Urine Appearance   


 


Urine pH   


 


Ur Specific Gravity   


 


Urine Protein   


 


Urine Glucose (UA)   


 


Urine Ketones   


 


Urine Blood   


 


Urine RBC (Auto)   














  06/07/20





  14:46


 


WBC 


 


RBC 


 


Hgb 


 


Hct 


 


MCV 


 


MCH 


 


MCHC 


 


RDW 


 


Plt Count 


 


Seg Neutrophils % 


 


Sodium 


 


Potassium 


 


Chloride 


 


Carbon Dioxide 


 


Anion Gap 


 


BUN 


 


Creatinine 


 


Est GFR (African Amer) 


 


Glucose 


 


Lactic Acid 


 


Calcium 


 


Magnesium 


 


Total Bilirubin 


 


AST 


 


Alkaline Phosphatase 


 


Total Protein 


 


Albumin 


 


Urine Color  PETE


 


Urine Appearance  CLOUDY


 


Urine pH  5.0


 


Ur Specific Gravity  1.018


 


Urine Protein  100 H


 


Urine Glucose (UA)  NEGATIVE


 


Urine Ketones  NEGATIVE


 


Urine Blood  NEGATIVE


 


Urine RBC (Auto)  10











Impressions: 


                                        





Chest X-Ray  06/07/20 14:25


IMPRESSION:  Similar right parahilar mass and postobstructive changes -airspace 

disease in the right mid lung.


 








Head CT  06/07/20 14:26


IMPRESSION:  NO ACUTE INTRACRANIAL FINDINGS.Left sphenoid sinus opacification.


EVIDENCE OF ACUTE STROKE: NO.


 














Assessment and Plan





- Diagnosis


(1) Toxic encephalopathy


Is this a current diagnosis for this admission?: Yes   


Plan: 


We will need to hold all of her sedating medications and have some as needed 

Narcan available and let the opiates in her system washout.  She will likely 

need some adjustment of her medication regimen.  She tends to complain a lot of 

pain even when she looks comfortable so we will have to be very judicious when 

giving her anything as needed later on and we will have this discussion with her

family before she is discharged.








(2) Hyponatremia


Is this a current diagnosis for this admission?: Yes   


Plan: 


Very mild compared to her recent presentation.  Was giving her some IV fluids 

now.  If she stays around 130 we will be content with that.  We are holding her 

chlorthalidone.








(3) Acute kidney injury


Is this a current diagnosis for this admission?: Yes   


Plan: 


Suspect that this is prerenal.  We will give her some IV fluids.








(4) Opiate overdose


Qualifiers: 


   Encounter type: initial encounter   Injury intent: accidental or 

unintentional   Qualified Code(s): T40.601A - Poisoning by unspecified 

narcotics, accidental (unintentional), initial encounter   


Is this a current diagnosis for this admission?: Yes   


Plan: 


We will going to hold all of her opiates.  This was likely unintentional.  We 

will let it wash out of her system and then will readjust her medication regimen

before she goes home.








(5) Lung cancer


Qualifiers: 


   Laterality: right   Lung location: hilum of lung   Qualified Code(s): C34.01 

- Malignant neoplasm of right main bronchus   


Is this a current diagnosis for this admission?: Yes   


Plan: 


She will resume her outpatient follow-up








(6) COPD (chronic obstructive pulmonary disease)


Qualifiers: 


   COPD type: chronic bronchitis   Chronic bronchitis type: simple   Qualified 

Code(s): J41.0 - Simple chronic bronchitis   


Is this a current diagnosis for this admission?: Yes   


Plan: 


Continue home meds, not acutely exacerbated








(7) Hypertension


Qualifiers: 


   Hypertension type: essential hypertension 


Is this a current diagnosis for this admission?: Yes   


Plan: 


We will continue her Coreg but we are going to hold her other medications for 

now until she wakes up some more








(8) Hypothyroidism


Qualifiers: 


   Hypothyroidism type: unspecified 


Is this a current diagnosis for this admission?: Yes   


Plan: 


Continue Synthroid








(9) Dehydration


Is this a current diagnosis for this admission?: Yes   


Plan: 


We will hydrate and monitor urine output








- Time


Time Spent with patient: 35 or more minutes





- Inpatient Certification


Based on my medical assessment, after consideration of the patient's 

comorbidities, presenting symptoms, or acuity I expect that the services needed 

warrant INPATIENT care.: Yes


I certify that my determination is in accordance with my understanding of 

Medicare's requirements for reasonable and necessary INPATIENT services [42 CFR 

412.3e].: Yes


Medical Necessity: Significant Comorbidiites Make Outpatient Treatment Too 

Risky, Need Close Monitoring Due to Risk of Patient Decompensation, Need For IV 

Fluids, Need For Continuous Telemetry Monitoring, Risk of Complication if Not 

Cared For in Hospital

## 2020-06-07 NOTE — RADIOLOGY REPORT (SQ)
EXAM DESCRIPTION:  CT HEAD WITHOUT



IMAGES COMPLETED DATE/TIME:  6/7/2020 3:24 pm



REASON FOR STUDY:  AMS



COMPARISON:  None.



TECHNIQUE:  Axial images acquired through the brain without intravenous contrast. Images reviewed wit
h bone, brain and subdural windows.  Images stored on PACS.

All CT scanners at this facility use dose modulation, iterative reconstruction, and/or weight based d
osing when appropriate to reduce radiation dose to as low as reasonably achievable (ALARA).

[facility]: Dose Right  [facility]: CareDose    [facility]: Dose Right    [facility]: Teradose 4D    [facility]: Smart Technologies



RADIATION DOSE:  CT Rad equipment meets quality standard of care and radiation dose reduction techniq
ues were employed. CTDIvol: 53.2 mGy. DLP: 911 mGy-cm..



LIMITATIONS:  None.



FINDINGS:  VENTRICLES: Normal size and contour.

CEREBRUM: No masses. No hemorrhage. No midline shift. Age appropriate white matter. No evidence for a
cute infarction.

CEREBELLUM: No masses. No hemorrhage. No alteration of density. No evidence for acute infarction.

EXTRA-AXIAL SPACES: No fluid collections.

ORBITS AND GLOBE: No intra- or extraconal masses. Normal contour of globe without masses.

CALVARIUM: No fracture.

PARANASAL SINUSES: Left sphenoid sinus opacification.

SOFT TISSUES: No mass or hematoma.

OTHER: No other significant finding.



IMPRESSION:  NO ACUTE INTRACRANIAL FINDINGS.Left sphenoid sinus opacification.

EVIDENCE OF ACUTE STROKE: NO.



TECHNICAL DOCUMENTATION:  JOB ID:  [id number]

[id number]

Quality ID # [id number]: Final reports with documentation of one or more dose reduction techniques (e.g., Au
tomated exposure control, adjustment of the mA and/or kV according to patient size, use of iterative 
reconstruction technique)

 2011 [employer]- All Rights Reserved



Reading location - IP/workstation name: [employer]

## 2020-06-07 NOTE — ER DOCUMENT REPORT
ED General





- General


Chief Complaint: Altered Mental Status


Stated Complaint: AMS


Time Seen by Provider: 06/07/20 14:18


Notes: 





74-year-old woman brought to the emergency department from home due to altered 

mental status.  Patient appears to be drowsy and somewhat somnolent.  She has a 

history of lung cancer and is on opiate medications for chronic back pain.  

Apparently the daughter has been giving her the medications.  Patient will open 

her eyes and answer some questions but then drifts right off to sleep.  There 

are no focal neurologic findings and she is unable to tell me how much 

medication she has taken recently. Past medical history of hyponatremia, chronic

back pain, COPD, HTN, UTI, pneumonia and lung cancer diagnosed recently at 

[facility].  She was discharged home on Friday from [place], according

to the daughter, she was sedated and hallucinating at that time.  Yesterday she 

seemed somewhat better, more alert and more responsive, today she is confused 

and more sedated.  She has a UTI which was noted, however not treated during her

hospitalization according to the daughter.


TRAVEL OUTSIDE OF THE [place] IN LAST 30 DAYS: No





- Related Data


Allergies/Adverse Reactions: 


                                        





levofloxacin [From Levaquin] Allergy (Severe, Verified 06/07/20 14:22)


   Tachycardia


gabapentin [From Neurontin] Allergy (Intermediate, Verified 06/07/20 14:22)


   CONFUSED/TREMORS


polymyxin B sulfate [From Polysporin] Allergy (Mild, Verified 06/07/20 14:22)


   IRRITATION, HIVES











Past Medical History





- Social History


Smoking Status: Unknown if Ever Smoked


Family History: Reviewed & Not Pertinent





- Past Medical History


Cardiac Medical History: Reports: Hx Atrial Fibrillation, Hx Congestive Heart 

Failure, Hx Hypercholesterolemia, Hx Hypertension - MEDS


   Denies: Hx Heart Attack


Pulmonary Medical History: Reports: Hx COPD


   Denies: Hx Asthma


Neurological Medical History: Denies: Hx Cerebrovascular Accident, Hx Seizures


Endocrine Medical History: Reports: Hx Hypothyroidism


Renal/ Medical History: Denies: Hx Peritoneal Dialysis


GI Medical History: Reports: Hx Gastroesophageal Reflux Disease, Hx Hiatal 

Hernia - YEARS AGO.  Denies: Hx Hepatitis, Hx Ulcer


Musculoskeletal Medical History: Reports Hx Arthritis


Psychiatric Medical History: 


   Denies: Hx Depression


Infectious Medical History: Denies: Hx Hepatitis


Past Surgical History: Reports: Hx Cardiac Catheterization, Hx Cardiac Surgery -

ICD PACER/DEFIB [place] [facility], Hx Hysterectomy, Hx Internal Defibrillator, Hx 

Orthopedic Surgery - BACK, Hx Pacemaker - 2008, Hx Tonsillectomy, Other - Back 

surgery Aortofemoral bypass.  Denies: Hx Mastectomy, Hx Open Heart Surgery





- Immunizations


Hx Diphtheria, Pertussis, Tetanus Vaccination: Yes


Hx Pneumococcal Vaccination: 06/01/18





Review of Systems





- Review of Systems


Notes: 





Constitutional: Negative for fever.


HENT: Negative for sore throat.


Eyes: Negative for visual changes.


Cardiovascular: Negative for chest pain.


Respiratory: Negative for shortness of breath.


Gastrointestinal: Negative for abdominal pain, vomiting or diarrhea.


Genitourinary: Negative for dysuria.


Musculoskeletal: Negative for back pain.


Skin: Negative for rash.


Neurological: See HPI





10 point ROS negative except as marked above and in HPI.





Physical Exam





- Vital signs


Vitals: 


                                        











Resp


 


 26 H


 


 06/07/20 13:59














- Notes


Notes: 





PHYSICAL EXAMINATION:


 


Physical Exam:


General: Somnolent 74-year-old woman no acute distress


HEENT: NC/AT, pupils equal round and reactive to light, MM moist,nares clear, o

ropharynx clear, airway patent


Neck: supple, no adenopathy, no masses.  Good range of motion


Lungs: clear, no wheezing, no rales no rhonchi


CVS: Regular rate and rhythm no murmur gallop or rub


Abdomen: Soft, active, nontender, no masses, no hepatosplenomegaly


Ext:   No edema, clubbing or cyanosis.


Neuro: Patient opens her eyes to her name being called, answers some questions 

appropriately, however inconsistent and rambling thoughts.


Skin: Intact no open lesions, no rash











Course





- Re-evaluation


Re-evalutation: 





06/07/20 17:42


Altered mental status and a 74-year-old woman, Narcan 0.2 mg given, she has 

increase alertness and responsiveness to Narcan.  Given her chronic pain, cancer

diagnosis and infection/UTI, we will hold the Narcan given that she is 

maintaining her airway without difficulties.





I discussed the patient with the hospitalist, Dr. Coreas, he will see the 

patient in the emergency department.





- Vital Signs


Vital signs: 


                                        











Temp Pulse Resp BP Pulse Ox


 


 97.8 F   94   23 H  137/101 H  93 


 


 06/08/20 15:07  06/08/20 23:06  06/08/20 15:07 06/08/20 23:06  06/08/20 23:06














- Laboratory


Result Diagrams: 


                                 06/07/20 14:20





                                 06/07/20 14:20


Laboratory results interpreted by me: 


                                        











  06/07/20 06/07/20 06/07/20





  14:20 14:20 14:46


 


WBC  11.6 H  


 


RBC  3.19 L  


 


Hgb  10.0 L  


 


Hct  29.7 L  


 


Seg Neuts % (Manual)  89 H  


 


Lymphocytes % (Manual)  5 L  


 


Abs Neuts (Manual)  10.3 H  


 


Sodium   131.1 L 


 


Chloride   93 L 


 


BUN   29 H 


 


Creatinine   1.62 H 


 


Est GFR ( Amer)   37 L 


 


Est GFR (MDRD) Non-Af   31 L 


 


Glucose   119 H 


 


Magnesium   1.5 L 


 


Direct Bilirubin   0.5 H 


 


AST   73 H 


 


ALT   49 H 


 


Albumin   3.2 L 


 


Urine Protein    100 H


 


Urine Bilirubin    SMALL H


 


Urine Urobilinogen    4.0 H


 


Leukocyte Esterase Rfl    LARGE H








I have reviewed laboratory data and used this information for the treatment 

decisions regarding the patient.





- Diagnostic Test


Radiology reviewed: Image reviewed, Reports reviewed


Radiology results interpreted by me: 





06/07/20 17:47


Chest x-ray: Right perihilar mass and postobstructive changes with airspace 

disease noted.  Patient also has a pacemaker defibrillator unit noted left chest

wall.





CT head noncontrast: No acute intracranial findings.





Critical Care Note





- Critical Care Note


Total time excluding time spent on procedures (mins): 60 - Critical care time 

spent obtaining history from patient or surrogate, discussions with consultants,

development of treatment plan with patient or surrogate, evaluation of patient's

response to treatment, examination of patient, ordering and performing tr

eatments and interventions, ordering and review of laboratory studies, re-

evaluation of patient's condition, ordering and review of radiographic studies 

and review of old charts





Discharge





- Discharge


Clinical Impression: 


 Adv eff opiates





Altered mental status


Qualifiers:


 Altered mental status type: unspecified Qualified Code(s): R41.82 - Altered 

mental status, unspecified





Urinary tract infection


Qualifiers:


 Urinary tract infection type: site unspecified Hematuria presence: without 

hematuria Qualified Code(s): N39.0 - Urinary tract infection, site not specified





Lung cancer


Qualifiers:


 Laterality: right Lung location: hilum of lung Qualified Code(s): C34.01 - 

Malignant neoplasm of right main bronchus





Chronic back pain


Qualifiers:


 Back pain location: back pain in other location Qualified Code(s): M54.9 - 

Dorsalgia, unspecified





Condition: Good


Disposition: ADMITTED AS INPATIENT


Admitting Provider: Joss (Hospitalist)


Unit Admitted: Telemetry

## 2020-06-08 RX ADMIN — NITROGLYCERIN SCH EACH: 0.4 PATCH TRANSDERMAL at 15:07

## 2020-06-08 RX ADMIN — ACYCLOVIR SCH: 200 CAPSULE ORAL at 23:30

## 2020-06-08 RX ADMIN — Medication SCH: at 15:17

## 2020-06-08 RX ADMIN — CARVEDILOL SCH: 12.5 TABLET, FILM COATED ORAL at 23:29

## 2020-06-08 RX ADMIN — Medication SCH: at 23:30

## 2020-06-08 RX ADMIN — HEPARIN SODIUM SCH: 5000 INJECTION, SOLUTION INTRAVENOUS; SUBCUTANEOUS at 23:29

## 2020-06-08 RX ADMIN — CEFTRIAXONE SCH MLS/HR: 1 INJECTION, SOLUTION INTRAVENOUS at 17:47

## 2020-06-08 RX ADMIN — HEPARIN SODIUM SCH: 5000 INJECTION, SOLUTION INTRAVENOUS; SUBCUTANEOUS at 06:15

## 2020-06-08 RX ADMIN — HEPARIN SODIUM SCH: 5000 INJECTION, SOLUTION INTRAVENOUS; SUBCUTANEOUS at 15:09

## 2020-06-08 RX ADMIN — HYDRALAZINE HYDROCHLORIDE PRN MG: 20 INJECTION INTRAMUSCULAR; INTRAVENOUS at 15:04

## 2020-06-08 RX ADMIN — ACYCLOVIR SCH: 200 CAPSULE ORAL at 15:18

## 2020-06-08 RX ADMIN — HYDRALAZINE HYDROCHLORIDE PRN MG: 20 INJECTION INTRAMUSCULAR; INTRAVENOUS at 07:00

## 2020-06-08 RX ADMIN — Medication SCH: at 06:15

## 2020-06-08 RX ADMIN — CARVEDILOL SCH: 12.5 TABLET, FILM COATED ORAL at 15:08

## 2020-06-08 RX ADMIN — BENAZEPRIL HYDROCHLORIDE SCH: 10 TABLET ORAL at 23:29

## 2020-06-08 RX ADMIN — SODIUM CHLORIDE PRN MLS/HR: 9 INJECTION, SOLUTION INTRAVENOUS at 15:19

## 2020-06-08 NOTE — PDOC PROGRESS REPORT
Subjective


Progress Note for:: 06/08/20


Subjective:: 





No adverse events overnight.  She is more awake today, but she still 

encephalopathic and thinks everybody here is out to get her.  She says she does 

not want anything down but she is not actively trying to physically prevent us 

from doing anything like giving her IV antibiotics or IV fluids.  We have been 

having to straight cath her because of some urinary retention and so we decided 

to place a Reich catheter.


Reason For Visit: 


TOXIC ENCEPHALOPATHY








Physical Exam


Vital Signs: 


                                        











Temp Pulse Resp BP Pulse Ox


 


 97.3 F   104 H  16   140/97 H  92 


 


 06/08/20 07:44  06/08/20 07:44  06/08/20 07:44  06/08/20 07:44  06/08/20 01:46








                                 Intake & Output











 06/07/20 06/08/20 06/09/20





 06:59 06:59 06:59


 


Intake Total  1000 0


 


Output Total  650 


 


Balance  350 0


 


Weight  68.5 kg 











General appearance: PRESENT: disheveled, mild distress


Respiratory exam: PRESENT: chest wall tenderness, symmetrical, unlabored.  

ABSENT: accessory muscle use, clear to auscultation sancho, crackles, prolonged 

expiratory phas, tachypnea, wheezes


Cardiovascular exam: PRESENT: RRR - Ventricular paced on monitor, +S1, +S2


Pulses: PRESENT: normal carotid pulses


Vascular exam: PRESENT: normal capillary refill


GI/Abdominal exam: PRESENT: normal bowel sounds, soft.  ABSENT: distended, 

guarding, rebound, tenderness


Extremities exam: ABSENT: clubbing, pedal edema


Musculoskeletal exam: PRESENT: normal inspection.  ABSENT: deformity


Neurological exam: PRESENT: alert, awake, oriented to person, oriented to place.

 ABSENT: oriented to situation


Psychiatric exam: PRESENT: anxious


Focused psych exam: PRESENT: paranoid, other - She keeps saying ", do not 

let them do this to me"


Skin exam: PRESENT: dry, warm





Results


Laboratory Results: 


                                        





                                 06/07/20 14:20 





                                 06/07/20 14:20 





                                        











  06/07/20 06/07/20 06/07/20





  14:20 14:20 14:20


 


WBC  11.6 H  


 


RBC  3.19 L  


 


Hgb  10.0 L  


 


Hct  29.7 L  


 


MCV  93  


 


MCH  31.4  


 


MCHC  33.7  


 


RDW  13.6  


 


Plt Count  260  


 


Seg Neutrophils %  Not Reportable  


 


Sodium   131.1 L 


 


Potassium   3.6 


 


Chloride   93 L 


 


Carbon Dioxide   29 


 


Anion Gap   9 


 


BUN   29 H 


 


Creatinine   1.62 H 


 


Est GFR ( Amer)   37 L 


 


Glucose   119 H 


 


Lactic Acid    1.2


 


Calcium   10.1 


 


Magnesium   1.5 L 


 


Total Bilirubin   1.2 


 


AST   73 H 


 


Alkaline Phosphatase   48 


 


Total Protein   6.3 


 


Albumin   3.2 L 


 


Urine Color   


 


Urine Appearance   


 


Urine pH   


 


Ur Specific Gravity   


 


Urine Protein   


 


Urine Glucose (UA)   


 


Urine Ketones   


 


Urine Blood   


 


Urine RBC (Auto)   














  06/07/20 06/07/20





  14:46 20:28


 


WBC  


 


RBC  


 


Hgb  


 


Hct  


 


MCV  


 


MCH  


 


MCHC  


 


RDW  


 


Plt Count  


 


Seg Neutrophils %  


 


Sodium  


 


Potassium  


 


Chloride  


 


Carbon Dioxide  


 


Anion Gap  


 


BUN  


 


Creatinine  


 


Est GFR (African Amer)  


 


Glucose  


 


Lactic Acid   0.7


 


Calcium  


 


Magnesium  


 


Total Bilirubin  


 


AST  


 


Alkaline Phosphatase  


 


Total Protein  


 


Albumin  


 


Urine Color  PETE 


 


Urine Appearance  CLOUDY 


 


Urine pH  5.0 


 


Ur Specific Gravity  1.018 


 


Urine Protein  100 H 


 


Urine Glucose (UA)  NEGATIVE 


 


Urine Ketones  NEGATIVE 


 


Urine Blood  NEGATIVE 


 


Urine RBC (Auto)  10 








                                        





06/07/20 17:45   Blood   Blood Culture (PCR) - Final


                            Enterococcus Species


06/07/20 14:20   Blood   Blood Culture (PCR) - Final


                            Enterococcus Species








Impressions: 


                                        





Chest X-Ray  06/07/20 14:25


IMPRESSION:  Similar right parahilar mass and postobstructive changes -airspace 

disease in the right mid lung.


 








Head CT  06/07/20 14:26


IMPRESSION:  NO ACUTE INTRACRANIAL FINDINGS.Left sphenoid sinus opacification.


EVIDENCE OF ACUTE STROKE: NO.


 














Assessment and Plan





- Diagnosis


(1) Toxic encephalopathy


Is this a current diagnosis for this admission?: Yes   


Plan: 


This is at least partially resolved.  She is much more awake but now she is 

paranoid and convinced that were out to get her.  1 of the issues is that this 

is a chronically ill lady that is on an extraordinarily large number of 

medications, and the combination of them circulating through her system is more 

than likely contributing to her current psychological state.








(2) Hyponatremia


Is this a current diagnosis for this admission?: Yes   


Plan: 


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  She has slowly trended down a little 

bit since she left [facility].  We have had her on some gentle hydration here and 

her sodium has slowly come up.








(3) Acute kidney injury


Is this a current diagnosis for this admission?: Yes   


Plan: 


Slowly improving on IV fluids








(4) Opiate overdose


Qualifiers: 


   Encounter type: initial encounter   Injury intent: accidental or 

unintentional   Qualified Code(s): T40.601A - Poisoning by unspecified 

narcotics, accidental (unintentional), initial encounter   


Is this a current diagnosis for this admission?: Yes   


Plan: 


We will going to hold all of her opiates.  This was likely unintentional.  We 

will let it wash out of her system and then will readjust her medication regimen

before she goes home.








(5) Lung cancer


Qualifiers: 


   Laterality: right   Lung location: hilum of lung   Qualified Code(s): C34.01 

- Malignant neoplasm of right main bronchus   


Is this a current diagnosis for this admission?: Yes   


Plan: 


She will resume her outpatient follow-up








(6) COPD (chronic obstructive pulmonary disease)


Qualifiers: 


   COPD type: chronic bronchitis   Chronic bronchitis type: simple   Qualified 

Code(s): J41.0 - Simple chronic bronchitis   


Is this a current diagnosis for this admission?: Yes   


Plan: 


Continue home meds, not acutely exacerbated








(7) Hypertension


Qualifiers: 


   Hypertension type: essential hypertension   Qualified Code(s): I10 - 

Essential (primary) hypertension   


Is this a current diagnosis for this admission?: Yes   


Plan: 


We will continue her Coreg but we are going to hold her other medications for 

now until she wakes up some more








(8) Hypothyroidism


Qualifiers: 


   Hypothyroidism type: unspecified   Qualified Code(s): E03.9 - Hypothyroidism,

unspecified   


Is this a current diagnosis for this admission?: Yes   


Plan: 


Continue Synthroid








(9) Dehydration


Is this a current diagnosis for this admission?: Yes   


Plan: 


Improving with IV fluids








(10) Bacteremia due to Enterococcus


Is this a current diagnosis for this admission?: Yes   


Plan: 


Empirically on vancomycin.  This does not match up with what was in her urine.  

With her history of lung cancer and radiation treatments this could have gotten 

in through her respiratory tract.  She does not present like a pneumonia.  She 

will likely need a MYNOR.








- Time


Time Spent with patient: 25-34 minutes

## 2020-06-09 LAB
ANION GAP SERPL CALC-SCNC: 11 MMOL/L (ref 5–19)
BUN SERPL-MCNC: 24 MG/DL (ref 7–20)
CALCIUM: 10.1 MG/DL (ref 8.4–10.2)
CHLORIDE SERPL-SCNC: 96 MMOL/L (ref 98–107)
CO2 SERPL-SCNC: 29 MMOL/L (ref 22–30)
GLUCOSE SERPL-MCNC: 137 MG/DL (ref 75–110)
POTASSIUM SERPL-SCNC: 2.9 MMOL/L (ref 3.6–5)

## 2020-06-09 RX ADMIN — BENAZEPRIL HYDROCHLORIDE SCH: 10 TABLET ORAL at 22:00

## 2020-06-09 RX ADMIN — CARVEDILOL SCH: 12.5 TABLET, FILM COATED ORAL at 10:13

## 2020-06-09 RX ADMIN — POLYETHYLENE GLYCOL 3350 SCH: 17 POWDER, FOR SOLUTION ORAL at 10:14

## 2020-06-09 RX ADMIN — HYDRALAZINE HYDROCHLORIDE PRN MG: 20 INJECTION INTRAMUSCULAR; INTRAVENOUS at 23:18

## 2020-06-09 RX ADMIN — LEVOTHYROXINE SODIUM SCH: 25 TABLET ORAL at 06:16

## 2020-06-09 RX ADMIN — HEPARIN SODIUM SCH: 5000 INJECTION, SOLUTION INTRAVENOUS; SUBCUTANEOUS at 13:18

## 2020-06-09 RX ADMIN — HEPARIN SODIUM SCH: 5000 INJECTION, SOLUTION INTRAVENOUS; SUBCUTANEOUS at 06:16

## 2020-06-09 RX ADMIN — CARVEDILOL SCH: 12.5 TABLET, FILM COATED ORAL at 23:36

## 2020-06-09 RX ADMIN — ACYCLOVIR SCH: 200 CAPSULE ORAL at 23:37

## 2020-06-09 RX ADMIN — ACYCLOVIR SCH: 200 CAPSULE ORAL at 06:17

## 2020-06-09 RX ADMIN — POTASSIUM CHLORIDE SCH MLS/HR: 29.8 INJECTION, SOLUTION INTRAVENOUS at 13:13

## 2020-06-09 RX ADMIN — NITROGLYCERIN SCH EACH: 0.4 PATCH TRANSDERMAL at 10:22

## 2020-06-09 RX ADMIN — Medication SCH: at 13:19

## 2020-06-09 RX ADMIN — HYDRALAZINE HYDROCHLORIDE PRN MG: 20 INJECTION INTRAMUSCULAR; INTRAVENOUS at 10:24

## 2020-06-09 RX ADMIN — VANCOMYCIN HYDROCHLORIDE SCH MLS/HR: 1 INJECTION, POWDER, LYOPHILIZED, FOR SOLUTION INTRAVENOUS at 21:34

## 2020-06-09 RX ADMIN — HEPARIN SODIUM SCH: 5000 INJECTION, SOLUTION INTRAVENOUS; SUBCUTANEOUS at 22:00

## 2020-06-09 RX ADMIN — POTASSIUM CHLORIDE SCH MLS/HR: 29.8 INJECTION, SOLUTION INTRAVENOUS at 11:16

## 2020-06-09 RX ADMIN — ASPIRIN SCH: 81 TABLET, COATED ORAL at 10:13

## 2020-06-09 RX ADMIN — SODIUM CHLORIDE PRN MLS/HR: 9 INJECTION, SOLUTION INTRAVENOUS at 10:12

## 2020-06-09 RX ADMIN — ACYCLOVIR SCH: 200 CAPSULE ORAL at 13:19

## 2020-06-09 RX ADMIN — FERROUS SULFATE TAB 325 MG (65 MG ELEMENTAL FE) SCH: 325 (65 FE) TAB at 10:14

## 2020-06-09 RX ADMIN — PANTOPRAZOLE SODIUM SCH: 40 TABLET, DELAYED RELEASE ORAL at 06:16

## 2020-06-09 RX ADMIN — POTASSIUM CHLORIDE SCH MLS/HR: 29.8 INJECTION, SOLUTION INTRAVENOUS at 10:07

## 2020-06-09 RX ADMIN — CEFTRIAXONE SCH MLS/HR: 1 INJECTION, SOLUTION INTRAVENOUS at 13:13

## 2020-06-09 RX ADMIN — VANCOMYCIN HYDROCHLORIDE SCH MLS/HR: 1 INJECTION, POWDER, LYOPHILIZED, FOR SOLUTION INTRAVENOUS at 10:24

## 2020-06-09 RX ADMIN — Medication SCH: at 06:16

## 2020-06-09 NOTE — PDOC PROGRESS REPORT
Subjective


Progress Note for:: 06/09/20


Subjective:: 





We had to get a sitter for her yesterday but she is doing a little bit better 

today.  She is not hallucinating but she is still somewhat paranoid.  At least 

she has not been pulling out her IV or pulling off her telemetry leads.  

Patient's nurse that she spoke to the patient's daughter earlier and the 

daughter said that when she came home from [facility] she was in this condition.


Reason For Visit: 


TOXIC ENCEPHALOPATHY








Physical Exam


Vital Signs: 


                                        











Temp Pulse Resp BP Pulse Ox


 


 97.5 F   98   16   148/66 H  97 


 


 06/09/20 10:57  06/09/20 10:57  06/09/20 10:57  06/09/20 10:57  06/09/20 10:57








                                 Intake & Output











 06/08/20 06/09/20 06/10/20





 06:59 06:59 06:59


 


Intake Total 1000 2050 328


 


Output Total 650 750 400


 


Balance 350 1300 -72


 


Weight 68.5 kg 68.5 kg 








General appearance: PRESENT: disheveled, no apparent distress


Respiratory exam: PRESENT: chest wall tenderness, symmetrical, unlabored.  

ABSENT: accessory muscle use, clear to auscultation sancho, crackles, prolonged 

expiratory phas, tachypnea, wheezes


Cardiovascular exam: PRESENT: RRR - Ventricular paced on monitor, +S1, +S2


Pulses: PRESENT: normal carotid pulses


Vascular exam: PRESENT: normal capillary refill


GI/Abdominal exam: PRESENT: normal bowel sounds, soft.  ABSENT: distended, 

guarding, rebound, tenderness


Extremities exam: ABSENT: clubbing, pedal edema


Musculoskeletal exam: PRESENT: normal inspection.  ABSENT: deformity


Neurological exam: PRESENT: alert, awake, oriented to person, oriented to place.

 ABSENT: oriented to situation


Psychiatric exam: PRESENT: anxious


Focused psych exam: PRESENT: paranoid


Skin exam: PRESENT: dry, warm





Results


Laboratory Results: 


                                        





                                 06/07/20 14:20 





                                 06/09/20 06:48 





                                        











  06/09/20





  06:48


 


Sodium  135.7 L


 


Potassium  2.9 L*


 


Chloride  96 L


 


Carbon Dioxide  29


 


Anion Gap  11


 


BUN  24 H


 


Creatinine  0.61


 


Est GFR (African Amer)  > 60


 


Glucose  137 H


 


Calcium  10.1








                                        





06/07/20 14:20   Blood   Blood Culture (PCR) - Final


                            Enterococcus Species


06/07/20 17:45   Blood   Blood Culture (PCR) - Final


                            Enterococcus Species


06/07/20 14:46   Clean Catch Midstream   Urine Culture - Final


                            Escherichia Coli








Impressions: 


                                        





Chest X-Ray  06/07/20 14:25


IMPRESSION:  Similar right parahilar mass and postobstructive changes -airspace 

disease in the right mid lung.


 








Head CT  06/07/20 14:26


IMPRESSION:  NO ACUTE INTRACRANIAL FINDINGS.Left sphenoid sinus opacification.


EVIDENCE OF ACUTE STROKE: NO.


 














Assessment and Plan





- Diagnosis


(1) Toxic encephalopathy


Is this a current diagnosis for this admission?: Yes   


Plan: 


She has improved somewhat with antibiotics.  We are holding her basal pain 

medications and I am just giving her some as needed medication.  I would like to

get an MRI of her brain but I do not think she would lay still long enough to 

get it done.








(2) Hyponatremia


Is this a current diagnosis for this admission?: Yes   


Plan: 


Resolved for now because she is on fluids








(3) Acute kidney injury


Is this a current diagnosis for this admission?: Yes   


Plan: 


Resolved








(4) Opiate overdose


Qualifiers: 


   Encounter type: initial encounter   Injury intent: accidental or 

unintentional   Qualified Code(s): T40.601A - Poisoning by unspecified 

narcotics, accidental (unintentional), initial encounter   


Is this a current diagnosis for this admission?: Yes   


Plan: 


Resolved








(5) Lung cancer


Qualifiers: 


   Laterality: right   Lung location: hilum of lung   Qualified Code(s): C34.01 

- Malignant neoplasm of right main bronchus   


Is this a current diagnosis for this admission?: Yes   


Plan: 


She will resume her outpatient follow-up








(6) COPD (chronic obstructive pulmonary disease)


Qualifiers: 


   COPD type: chronic bronchitis   Chronic bronchitis type: simple   Qualified 

Code(s): J41.0 - Simple chronic bronchitis   


Is this a current diagnosis for this admission?: Yes   


Plan: 


Continue home meds, not acutely exacerbated








(7) Hypertension


Qualifiers: 


   Hypertension type: essential hypertension   Qualified Code(s): I10 - 

Essential (primary) hypertension   


Is this a current diagnosis for this admission?: Yes   


Plan: 


We will continue her Coreg but we are going to hold her other medications for 

now until she wakes up some more








(8) Hypothyroidism


Qualifiers: 


   Hypothyroidism type: unspecified   Qualified Code(s): E03.9 - Hypothyroidism,

unspecified   


Is this a current diagnosis for this admission?: Yes   


Plan: 


Continue Synthroid








(9) Dehydration


Is this a current diagnosis for this admission?: Yes   


Plan: 


Resolved








(10) Bacteremia due to Enterococcus


Is this a current diagnosis for this admission?: Yes   


Plan: 


Empirically on vancomycin, awaiting sensitivities.  This does not match up with 

what was in her urine.  With her history of lung cancer and radiation treatments

this could have gotten in through her respiratory tract.  She does not present 

like a pneumonia.  She will likely need a MYNOR.








- Time


Time Spent with patient: 25-34 minutes

## 2020-06-10 LAB
ANION GAP SERPL CALC-SCNC: 9 MMOL/L (ref 5–19)
BUN SERPL-MCNC: 23 MG/DL (ref 7–20)
CALCIUM: 10.1 MG/DL (ref 8.4–10.2)
CHLORIDE SERPL-SCNC: 100 MMOL/L (ref 98–107)
CO2 SERPL-SCNC: 27 MMOL/L (ref 22–30)
GLUCOSE SERPL-MCNC: 120 MG/DL (ref 75–110)
POTASSIUM SERPL-SCNC: 2.6 MMOL/L (ref 3.6–5)

## 2020-06-10 RX ADMIN — POTASSIUM CHLORIDE SCH MEQ: 750 TABLET, FILM COATED, EXTENDED RELEASE ORAL at 11:18

## 2020-06-10 RX ADMIN — SODIUM CHLORIDE PRN MLS/HR: 9 INJECTION, SOLUTION INTRAVENOUS at 16:34

## 2020-06-10 RX ADMIN — CEFTRIAXONE SCH MLS/HR: 2 INJECTION, SOLUTION INTRAVENOUS at 17:57

## 2020-06-10 RX ADMIN — NITROGLYCERIN SCH EACH: 0.4 PATCH TRANSDERMAL at 11:46

## 2020-06-10 RX ADMIN — HEPARIN SODIUM SCH UNIT: 5000 INJECTION, SOLUTION INTRAVENOUS; SUBCUTANEOUS at 15:00

## 2020-06-10 RX ADMIN — PANTOPRAZOLE SODIUM SCH: 40 TABLET, DELAYED RELEASE ORAL at 06:54

## 2020-06-10 RX ADMIN — ACYCLOVIR SCH MG: 200 CAPSULE ORAL at 15:00

## 2020-06-10 RX ADMIN — Medication SCH: at 15:08

## 2020-06-10 RX ADMIN — MAGNESIUM OXIDE TAB 400 MG (241.3 MG ELEMENTAL MG) SCH MG: 400 (241.3 MG) TAB at 11:18

## 2020-06-10 RX ADMIN — POLYETHYLENE GLYCOL 3350 SCH: 17 POWDER, FOR SOLUTION ORAL at 11:41

## 2020-06-10 RX ADMIN — ACYCLOVIR SCH MG: 200 CAPSULE ORAL at 21:08

## 2020-06-10 RX ADMIN — MAGNESIUM OXIDE TAB 400 MG (241.3 MG ELEMENTAL MG) SCH MG: 400 (241.3 MG) TAB at 21:08

## 2020-06-10 RX ADMIN — FERROUS SULFATE TAB 325 MG (65 MG ELEMENTAL FE) SCH MG: 325 (65 FE) TAB at 11:18

## 2020-06-10 RX ADMIN — AMPICILLIN SCH MLS/HR: 2 INJECTION, POWDER, FOR SOLUTION INTRAVENOUS at 23:06

## 2020-06-10 RX ADMIN — POTASSIUM CHLORIDE SCH MLS/HR: 29.8 INJECTION, SOLUTION INTRAVENOUS at 06:34

## 2020-06-10 RX ADMIN — POTASSIUM CHLORIDE SCH MEQ: 750 TABLET, FILM COATED, EXTENDED RELEASE ORAL at 16:36

## 2020-06-10 RX ADMIN — MAGNESIUM SULFATE IN DEXTROSE SCH: 10 INJECTION, SOLUTION INTRAVENOUS at 16:29

## 2020-06-10 RX ADMIN — CARVEDILOL SCH MG: 12.5 TABLET, FILM COATED ORAL at 11:18

## 2020-06-10 RX ADMIN — ASPIRIN SCH MG: 81 TABLET, COATED ORAL at 11:18

## 2020-06-10 RX ADMIN — POTASSIUM CHLORIDE SCH MLS/HR: 29.8 INJECTION, SOLUTION INTRAVENOUS at 15:01

## 2020-06-10 RX ADMIN — MORPHINE SULFATE PRN MG: 10 INJECTION INTRAMUSCULAR; INTRAVENOUS; SUBCUTANEOUS at 21:09

## 2020-06-10 RX ADMIN — CARVEDILOL SCH MG: 12.5 TABLET, FILM COATED ORAL at 21:09

## 2020-06-10 RX ADMIN — Medication SCH: at 06:54

## 2020-06-10 RX ADMIN — MAGNESIUM SULFATE IN DEXTROSE SCH MLS/HR: 10 INJECTION, SOLUTION INTRAVENOUS at 17:17

## 2020-06-10 RX ADMIN — HYDRALAZINE HYDROCHLORIDE PRN MG: 20 INJECTION INTRAMUSCULAR; INTRAVENOUS at 11:23

## 2020-06-10 RX ADMIN — MAGNESIUM SULFATE IN DEXTROSE SCH MLS/HR: 10 INJECTION, SOLUTION INTRAVENOUS at 16:38

## 2020-06-10 RX ADMIN — HEPARIN SODIUM SCH: 5000 INJECTION, SOLUTION INTRAVENOUS; SUBCUTANEOUS at 06:54

## 2020-06-10 RX ADMIN — Medication SCH: at 21:16

## 2020-06-10 RX ADMIN — BENAZEPRIL HYDROCHLORIDE SCH MG: 10 TABLET ORAL at 21:08

## 2020-06-10 RX ADMIN — POTASSIUM CHLORIDE SCH MLS/HR: 29.8 INJECTION, SOLUTION INTRAVENOUS at 11:30

## 2020-06-10 RX ADMIN — MORPHINE SULFATE PRN MG: 10 INJECTION INTRAMUSCULAR; INTRAVENOUS; SUBCUTANEOUS at 16:35

## 2020-06-10 RX ADMIN — Medication SCH: at 01:25

## 2020-06-10 RX ADMIN — LEVOTHYROXINE SODIUM SCH: 25 TABLET ORAL at 06:54

## 2020-06-10 RX ADMIN — HEPARIN SODIUM SCH UNIT: 5000 INJECTION, SOLUTION INTRAVENOUS; SUBCUTANEOUS at 21:13

## 2020-06-10 RX ADMIN — ACYCLOVIR SCH: 200 CAPSULE ORAL at 06:55

## 2020-06-10 RX ADMIN — CEFTRIAXONE SCH MLS/HR: 1 INJECTION, SOLUTION INTRAVENOUS at 15:00

## 2020-06-10 RX ADMIN — AMPICILLIN SCH MLS/HR: 2 INJECTION, POWDER, FOR SOLUTION INTRAVENOUS at 17:15

## 2020-06-10 RX ADMIN — AMPICILLIN SCH MLS/HR: 2 INJECTION, POWDER, FOR SOLUTION INTRAVENOUS at 11:19

## 2020-06-10 NOTE — PDOC PROGRESS REPORT
Subjective


Progress Note for:: 06/10/20


Subjective:: 





No adverse events overnight.  We were him in some trouble with IV access but we 

were able to get one in her foot.  I wanted to order her a PICC line but her 

blood cultures which were repeated did not show clearing of her bacteremia.  She

has settled down and is not having any hallucinations and is not verbalizing any

paranoia.  She is taking oral medications today.  I spoke to her daughter for a 

very long time this afternoon.  Patient is starting to eat a little bit now.


Reason For Visit: 


TOXIC ENCEPHALOPATHY








Physical Exam


Vital Signs: 


                                        











Temp Pulse Resp BP Pulse Ox


 


 97.5 F   89   15   157/80 H  94 


 


 06/10/20 16:00  06/10/20 16:00  06/10/20 16:00  06/10/20 16:00  06/10/20 16:00








                                 Intake & Output











 06/09/20 06/10/20 06/11/20





 06:59 06:59 06:59


 


Intake Total 2050 1428 605


 


Output Total 750 900 200


 


Balance 1300 528 405


 


Weight 68.5 kg 68.5 kg 








General appearance: PRESENT: disheveled, no apparent distress


Respiratory exam: PRESENT: chest wall tenderness, symmetrical, unlabored.  

ABSENT: accessory muscle use, clear to auscultation sancho, crackles, prolonged 

expiratory phas, tachypnea, wheezes


Cardiovascular exam: PRESENT: RRR - Ventricular paced on monitor, +S1, +S2


Pulses: PRESENT: normal carotid pulses


Vascular exam: PRESENT: normal capillary refill


GI/Abdominal exam: PRESENT: normal bowel sounds, soft.  ABSENT: distended, 

guarding, rebound, tenderness


Extremities exam: ABSENT: clubbing, pedal edema


Musculoskeletal exam: PRESENT: normal inspection.  ABSENT: deformity


Neurological exam: PRESENT: alert, awake, oriented to person, oriented to place.

 ABSENT: oriented to situation


Psychiatric exam: ABSENT: anxious


Skin exam: PRESENT: dry, warm








Results


Laboratory Results: 


                                        





                                 06/07/20 14:20 





                                 06/10/20 05:09 





                                        











  06/10/20 06/10/20





  05:09 05:09


 


Sodium  136.2 L 


 


Potassium  2.6 L* 


 


Chloride  100 


 


Carbon Dioxide  27 


 


Anion Gap  9 


 


BUN  23 H 


 


Creatinine  0.58 


 


Est GFR (African Amer)  > 60 


 


Glucose  120 H 


 


Calcium  10.1 


 


Magnesium   1.4 L








                                        





06/09/20 18:38   Blood   Blood Culture (PCR) - Final


                            Enterococcus Species


06/07/20 17:45   Blood   Blood Culture (PCR) - Final


                            Enterococcus Species


06/07/20 17:45   Blood   Blood Culture - Final


                            Enterococcus Faecalis(Group D)


06/07/20 14:20   Blood   Blood Culture (PCR) - Final


                            Enterococcus Species


06/07/20 14:20   Blood   Blood Culture - Final


                            Enterococcus Faecalis(Group D)








Impressions: 


                                        





Chest X-Ray  06/07/20 14:25


IMPRESSION:  Similar right parahilar mass and postobstructive changes -airspace 

disease in the right mid lung.


 








Head CT  06/07/20 14:26


IMPRESSION:  NO ACUTE INTRACRANIAL FINDINGS.Left sphenoid sinus opacification.


EVIDENCE OF ACUTE STROKE: NO.


 














Assessment and Plan





- Diagnosis


(1) Toxic encephalopathy


Is this a current diagnosis for this admission?: Yes   


Plan: 


She has improved somewhat with antibiotics.  We are holding her basal pain 

medications and I am just giving her some as needed medication.  We cannot get 

an MRI because of her pacemaker.  Fortunately, she seems to be improving a 

little bit each day.








(2) Hyponatremia


Is this a current diagnosis for this admission?: Yes   


Plan: 


Resolved








(3) Acute kidney injury


Is this a current diagnosis for this admission?: Yes   


Plan: 


Resolved








(4) Opiate overdose


Qualifiers: 


   Encounter type: initial encounter   Injury intent: accidental or 

unintentional   Qualified Code(s): T40.601A - Poisoning by unspecified 

narcotics, accidental (unintentional), initial encounter   


Is this a current diagnosis for this admission?: Yes   


Plan: 


Resolved








(5) Lung cancer


Qualifiers: 


   Laterality: right   Lung location: hilum of lung   Qualified Code(s): C34.01 

- Malignant neoplasm of right main bronchus   


Is this a current diagnosis for this admission?: Yes   


Plan: 


She will resume her outpatient follow-up.  She is still waiting for the follow-

up on the biopsy and has not started any treatment yet.








(6) COPD (chronic obstructive pulmonary disease)


Qualifiers: 


   COPD type: chronic bronchitis   Chronic bronchitis type: simple   Qualified 

Code(s): J41.0 - Simple chronic bronchitis   


Is this a current diagnosis for this admission?: Yes   


Plan: 


Continue home meds, not acutely exacerbated








(7) Hypertension


Qualifiers: 


   Hypertension type: essential hypertension   Qualified Code(s): I10 - 

Essential (primary) hypertension   


Is this a current diagnosis for this admission?: Yes   


Plan: 


She is not been taking her oral medications, but is doing so now, so we will see

how she responds.








(8) Hypothyroidism


Qualifiers: 


   Hypothyroidism type: unspecified   Qualified Code(s): E03.9 - Hypothyroidism,

unspecified   


Is this a current diagnosis for this admission?: Yes   


Plan: 


Continue Synthroid








(9) Dehydration


Is this a current diagnosis for this admission?: Yes   


Plan: 


Resolved








(10) Bacteremia due to Enterococcus


Is this a current diagnosis for this admission?: Yes   


Plan: 


She was on Rocephin so I continue that, but I switched her over to ampicillin 

because we got susceptibilities.  Dr. Kim recommended increasing the Rocephin

to 2 g a day.  Patient will need a MYNOR, but another consideration is her 

pacemaker.  It may have to be removed and replaced.








- Time


Time Spent with patient: 25-34 minutes

## 2020-06-10 NOTE — PROGRESS NOTE
Provider Note


Provider Note: 








[facility] ID Telephone Advice Consultation





Chart reviewed.  Patient is a 74-year-old woman with history of atrial fibril

lation, CHF, hypercholesterolemia, COPD, hypothyroidism who was recently 

admitted at [facility] due to right lung mass for which she had a bronchoscopy with 

biopsy.  Pathology confirmed small cell carcinoma.  She started radiation 

therapy.  She has been presenting altered mental status and paranoia. She has 

been talking opiates as well for back pain. She has had hyponatremia related to 

SIADH in the setting of lung cancer. On admission, she had leukocytosis and NANDINI.

 CT scan of the head was negative for any acute process, only opacification of 

the left sphenoid sinus.  Blood cultures are positive for Enterococcus faecalis 

and urine culture positive for E coli.  She did require a Reich catheter for u

rinary retention. CXR showed airspace disease in right mid lung with post 

obstructive changes.   Her current condition is complicated as she has an AICD 

and an aortobifemoral bypass.  She was initially on vancomycin, now on Unasyn.  

ID consulted for recommendations.





PMH:





Atrial fibrillation


CHF


Hypercholesterolemia


Hypertension


COPD


Hypothyroidism


Small Cell Carcinoma





PSH:





AICD


Aortobifemoral bypass





Allergies:





levofloxacin [From Levaquin] Allergy (Severe, Verified 06/07/20 14:22)


   Tachycardia


gabapentin [From Neurontin] Allergy (Intermediate, Verified 06/07/20 14:22)


   CONFUSED/TREMORS


polymyxin B sulfate [From Polysporin] Allergy (Mild, Verified 06/07/20 14:22)


   IRRITATION, HIVES





Medications:





Benazepril HCl [Lotensin 5 mg Tablet] 10 mg PO QHS 09/18/18 


Carvedilol [Coreg 12.5 mg Tablet] 12.5 mg PO Q12 09/18/18 


Chlorthalidone [Chlorthalidone 25 mg Tablet] 25 mg PO QAM 09/18/18 


Estradiol [Estrace] 1 mg PO QHS 09/18/18 


Fenofibrate Nanocrystallized [Tricor 145 mg Tablet] 145 mg PO DAILY 09/18/18 


Levothyroxine Sodium [Synthroid] 25 mcg PO Q6AM 09/18/18 


Oxycodone HCl [Oxycontin] 20 mg PO Q12 09/18/18 


Oxycodone HCl/Acetaminophen [Percocet 7.5-325 mg Tablet] 1 tab PO Q6HP PRN 

09/18/18 


Polyethylene Glycol 3350 [Miralax Powder 17 gm/Packet] 1 packet PO DAILY 

09/18/18 


Potassium Chloride [Klor-Con M10] 20 meq PO BID 09/18/18 


Pregabalin [Lyrica 75 mg Capsule] 75 mg PO DAILY 09/18/18 


Simvastatin [Zocor 40 mg Tablet] 40 mg PO QPM 09/18/18 


Temazepam [Restoril 15 mg Capsule] 15 mg PO QHS 09/18/18 


Acyclovir [Zovirax 200 mg Capsule] 200 mg PO Q8 05/18/20 


Albuterol Sulfate [Ventolin Hfa 8 gm Mdi] 1 puff IH Q4HP PRN 05/18/20 


Aspirin [Adult Low Dose Aspirin EC] 81 mg PO DAILY 05/18/20 


Betamethasone Dipropionate 1 applic TOP BID 05/18/20 


Cholecalciferol (Vitamin D3) [Vitamin D3 1000 Unit Tablet] 1,000 unit PO DAILY 

05/18/20 


Clotrimazole/Betamethasone Dip [Lotrisone Cream] 1 applic TOP BID 05/18/20 


Cyanocobalamin (Vitamin B-12) [Vitamin B-12 1000 mcg Tablet] 1,000 mcg PO DAILY 

05/18/20 


Estradiol [Estrace] 1 applic PV MOFR 05/18/20 


Ferrous Sulfate [Feosol 325 mg Tablet] 325 mg PO DAILY 05/18/20 


Lansoprazole 30 mg PO QAM 05/18/20 


Lidocaine [Lidoderm 5% (700 mg) Transdermal Patch] 1 patch TD DAILY 05/18/20 


Methocarbamol [Robaxin 500 mg Tablet] 500 mg PO QID 05/18/20 


Nitroglycerin [Nitro-Dur 10 mg (0.4MG/Hr) Transdermal Patch] 0.4 mg TD DAILY 

05/18/20 


Nitroglycerin [Nitrostat 0.4 mg (1/150 Gr) Tabs 25/Bottle] 0.4 mg SL Q5MP PRN 

05/18/20 


Nystatin [Mycostatin 500,000 Unit/5 ml Susp Udcup] 5 ml PO TID MDD SWISH AND 

SWALLOW 05/18/20 


Ondansetron [Zofran Odt 4 mg Tablet] 4 mg PO Q8HP PRN 05/18/20 


Umeclidinium Brm/Vilanterol Tr [Anoro Ellipta 62.5-25 Mcg INH] 1 puff IH DAILY 

05/18/20 








Vital Signs:











Temp Pulse Resp BP Pulse Ox


 


 97.5 F   89   15   157/80 H  94 


 


 06/10/20 16:00  06/10/20 16:00  06/10/20 16:00  06/10/20 16:00  06/10/20 16:00








                                 Intake & Output











 06/09/20 06/10/20 06/11/20





 06:59 06:59 06:59


 


Intake Total 2050 1428 605


 


Output Total 750 900 200


 


Balance 1300 528 405


 


Weight 68.5 kg 68.5 kg 








                                  Weight/Height





Weight                           68.5 kg


Height                           5 ft 7 in





Laboratories:





                                 06/07/20 14:20 





                                 06/10/20 05:09 





                                        











MCV  93 fl (80-97)   06/07/20  14:20    


 


MCH  31.4 pg (27.0-33.4)   06/07/20  14:20    


 


MCHC  33.7 g/dL (32.0-36.0)   06/07/20  14:20    


 


RDW  13.6 % (11.5-14.0)   06/07/20  14:20    


 


Seg Neutrophils %  Not Reportable   06/07/20  14:20    


 


Chloride  100 mmol/L ()   06/10/20  05:09    


 


Carbon Dioxide  27 mmol/L (22-30)   06/10/20  05:09    


 


Anion Gap  9  (5-19)   06/10/20  05:09    


 


Est GFR ( Amer)  > 60  (>60)   06/10/20  05:09    


 


Glucose  120 mg/dL ()  H  06/10/20  05:09    


 


Lactic Acid  0.7 mmol/L (0.7-2.1)   06/07/20  20:28    


 


Calcium  10.1 mg/dL (8.4-10.2)   06/10/20  05:09    


 


Magnesium  1.4 mg/dL (1.6-2.3)  L  06/10/20  05:09    


 


Total Bilirubin  1.2 mg/dL (0.2-1.3)   06/07/20  14:20    


 


AST  73 U/L (14-36)  H  06/07/20  14:20    


 


Alkaline Phosphatase  48 U/L ()   06/07/20  14:20    


 


Total Protein  6.3 g/dL (6.3-8.2)   06/07/20  14:20    


 


Albumin  3.2 g/dL (3.5-5.0)  L  06/07/20  14:20    


 


Urine Color  PETE   06/07/20  14:46    


 


Urine Appearance  CLOUDY   06/07/20  14:46    


 


Urine pH  5.0  (5.0-9.0)   06/07/20  14:46    


 


Ur Specific Gravity  1.018   06/07/20  14:46    


 


Urine Protein  100 mg/dL (NEGATIVE)  H  06/07/20  14:46    


 


Urine Glucose (UA)  NEGATIVE mg/dL (NEGATIVE)   06/07/20  14:46    


 


Urine Ketones  NEGATIVE mg/dL (NEGATIVE)   06/07/20  14:46    


 


Urine Blood  NEGATIVE  (NEGATIVE)   06/07/20  14:46    


 


Urine RBC (Auto)  10 /HPF  06/07/20  14:46    











06/09/20 18:38   Blood   Blood Culture (PCR) - Final


                            Enterococcus Species


06/07/20 17:45   Blood   Blood Culture (PCR) - Final


                            Enterococcus Species


06/07/20 17:45   Blood   Blood Culture - Final


                            Enterococcus Faecalis(Group D)


06/07/20 14:20   Blood   Blood Culture (PCR) - Final


                            Enterococcus Species


06/07/20 14:20   Blood   Blood Culture - Final


                            Enterococcus Faecalis(Group D)





Radiology:





Chest X-Ray  06/07/20 14:25


IMPRESSION:  Similar right parahilar mass and postobstructive changes -airspace 

disease in the right mid lung.


 


Head CT  06/07/20 14:26


IMPRESSION:  NO ACUTE INTRACRANIAL FINDINGS.Left sphenoid sinus opacification.


EVIDENCE OF ACUTE STROKE: NO.


 


Assessment and Recommendations:





Patient evaluated due to Enterococcus faecalis bacteremia in the setting of uri

nary retention, lung cancer s/p radiation and AICD.  Source of the infection 

still not clear if translocation from GI tract or urinary tract.  She has been 

bacteremic for 48 hr despite adequate antibiotic therapy.  A MYNOR is needed to 

rule out endocarditis or cardiac device infection.  E coli in the urine may be a

colonizer but she did have urinary retention so it is reasonable to cover it.  

Patient is on ceftriaxone and ampicillin which is the best combination for 

Enterococcal endocarditis.  If this is ruled out, we can de escalate to 

ampicillin monotherapy.  Combination therapy would be ceftriaxone 2g every 12 hr

and ampicillin 2g every 4 hr.  If MYNOR is positive for cardiac device infection, 

she will need explantation of the device and systemic antibiotic therapy.  If 

valvular vegetations, she will continue combination therapy for 6 weeks.  If all

these are ruled out, a shorter course of ampicillin would be recommended.   

Please call with updates or questions.








Marbella Kim MD


[facility] ID


[phone]

## 2020-06-11 LAB
ANION GAP SERPL CALC-SCNC: 6 MMOL/L (ref 5–19)
BUN SERPL-MCNC: 20 MG/DL (ref 7–20)
CALCIUM: 9.7 MG/DL (ref 8.4–10.2)
CHLORIDE SERPL-SCNC: 106 MMOL/L (ref 98–107)
CO2 SERPL-SCNC: 27 MMOL/L (ref 22–30)
ERYTHROCYTE [DISTWIDTH] IN BLOOD BY AUTOMATED COUNT: 13.9 % (ref 11.5–14)
GLUCOSE SERPL-MCNC: 142 MG/DL (ref 75–110)
HCT VFR BLD CALC: 30.1 % (ref 36–47)
HGB BLD-MCNC: 10.1 G/DL (ref 12–15.5)
MCH RBC QN AUTO: 31.2 PG (ref 27–33.4)
MCHC RBC AUTO-ENTMCNC: 33.5 G/DL (ref 32–36)
MCV RBC AUTO: 93 FL (ref 80–97)
PLATELET # BLD: 298 10^3/UL (ref 150–450)
POTASSIUM SERPL-SCNC: 3.9 MMOL/L (ref 3.6–5)
RBC # BLD AUTO: 3.24 10^6/UL (ref 3.72–5.28)
WBC # BLD AUTO: 10.2 10^3/UL (ref 4–10.5)

## 2020-06-11 RX ADMIN — MAGNESIUM OXIDE TAB 400 MG (241.3 MG ELEMENTAL MG) SCH MG: 400 (241.3 MG) TAB at 21:11

## 2020-06-11 RX ADMIN — CARVEDILOL SCH MG: 12.5 TABLET, FILM COATED ORAL at 21:12

## 2020-06-11 RX ADMIN — Medication SCH: at 06:25

## 2020-06-11 RX ADMIN — METHOCARBAMOL PRN MG: 500 TABLET ORAL at 21:11

## 2020-06-11 RX ADMIN — Medication SCH: at 13:19

## 2020-06-11 RX ADMIN — AMPICILLIN SCH MLS/HR: 2 INJECTION, POWDER, FOR SOLUTION INTRAVENOUS at 17:07

## 2020-06-11 RX ADMIN — MAGNESIUM OXIDE TAB 400 MG (241.3 MG ELEMENTAL MG) SCH MG: 400 (241.3 MG) TAB at 09:57

## 2020-06-11 RX ADMIN — HEPARIN SODIUM SCH UNIT: 5000 INJECTION, SOLUTION INTRAVENOUS; SUBCUTANEOUS at 21:12

## 2020-06-11 RX ADMIN — ACYCLOVIR SCH MG: 200 CAPSULE ORAL at 21:11

## 2020-06-11 RX ADMIN — SODIUM CHLORIDE PRN MLS/HR: 9 INJECTION, SOLUTION INTRAVENOUS at 05:27

## 2020-06-11 RX ADMIN — ACYCLOVIR SCH MG: 200 CAPSULE ORAL at 14:03

## 2020-06-11 RX ADMIN — MORPHINE SULFATE PRN MG: 10 INJECTION INTRAMUSCULAR; INTRAVENOUS; SUBCUTANEOUS at 03:04

## 2020-06-11 RX ADMIN — Medication SCH: at 21:12

## 2020-06-11 RX ADMIN — FERROUS SULFATE TAB 325 MG (65 MG ELEMENTAL FE) SCH MG: 325 (65 FE) TAB at 09:56

## 2020-06-11 RX ADMIN — AMPICILLIN SCH MLS/HR: 2 INJECTION, POWDER, FOR SOLUTION INTRAVENOUS at 05:36

## 2020-06-11 RX ADMIN — HEPARIN SODIUM SCH: 5000 INJECTION, SOLUTION INTRAVENOUS; SUBCUTANEOUS at 05:36

## 2020-06-11 RX ADMIN — ASPIRIN SCH MG: 81 TABLET, COATED ORAL at 09:57

## 2020-06-11 RX ADMIN — CEFTRIAXONE SCH MLS/HR: 2 INJECTION, SOLUTION INTRAVENOUS at 17:08

## 2020-06-11 RX ADMIN — POLYETHYLENE GLYCOL 3350 SCH: 17 POWDER, FOR SOLUTION ORAL at 09:57

## 2020-06-11 RX ADMIN — POTASSIUM CHLORIDE SCH MEQ: 750 TABLET, FILM COATED, EXTENDED RELEASE ORAL at 17:08

## 2020-06-11 RX ADMIN — CARVEDILOL SCH MG: 12.5 TABLET, FILM COATED ORAL at 09:57

## 2020-06-11 RX ADMIN — POTASSIUM CHLORIDE SCH MEQ: 750 TABLET, FILM COATED, EXTENDED RELEASE ORAL at 11:22

## 2020-06-11 RX ADMIN — HEPARIN SODIUM SCH UNIT: 5000 INJECTION, SOLUTION INTRAVENOUS; SUBCUTANEOUS at 13:15

## 2020-06-11 RX ADMIN — METHOCARBAMOL PRN MG: 500 TABLET ORAL at 14:03

## 2020-06-11 RX ADMIN — AMPICILLIN SCH MLS/HR: 2 INJECTION, POWDER, FOR SOLUTION INTRAVENOUS at 11:16

## 2020-06-11 RX ADMIN — LEVOTHYROXINE SODIUM SCH MG: 25 TABLET ORAL at 06:25

## 2020-06-11 RX ADMIN — POTASSIUM CHLORIDE SCH MEQ: 750 TABLET, FILM COATED, EXTENDED RELEASE ORAL at 09:57

## 2020-06-11 RX ADMIN — ONDANSETRON PRN MG: 4 TABLET, ORALLY DISINTEGRATING ORAL at 18:08

## 2020-06-11 RX ADMIN — HYDRALAZINE HYDROCHLORIDE PRN MG: 20 INJECTION INTRAMUSCULAR; INTRAVENOUS at 05:40

## 2020-06-11 RX ADMIN — HYDROCODONE BITARTRATE AND ACETAMINOPHEN PRN TAB: 5; 325 TABLET ORAL at 13:15

## 2020-06-11 RX ADMIN — ACYCLOVIR SCH MG: 200 CAPSULE ORAL at 06:25

## 2020-06-11 RX ADMIN — CEFTRIAXONE SCH MLS/HR: 2 INJECTION, SOLUTION INTRAVENOUS at 05:26

## 2020-06-11 RX ADMIN — AMPICILLIN SCH MLS/HR: 2 INJECTION, POWDER, FOR SOLUTION INTRAVENOUS at 20:09

## 2020-06-11 RX ADMIN — AMPICILLIN SCH MLS/HR: 2 INJECTION, POWDER, FOR SOLUTION INTRAVENOUS at 23:15

## 2020-06-11 RX ADMIN — HYDROCODONE BITARTRATE AND ACETAMINOPHEN PRN TAB: 5; 325 TABLET ORAL at 19:33

## 2020-06-11 RX ADMIN — PANTOPRAZOLE SODIUM SCH MG: 40 TABLET, DELAYED RELEASE ORAL at 06:25

## 2020-06-11 RX ADMIN — SODIUM CHLORIDE PRN MLS/HR: 9 INJECTION, SOLUTION INTRAVENOUS at 17:11

## 2020-06-11 RX ADMIN — NITROGLYCERIN SCH EACH: 0.4 PATCH TRANSDERMAL at 09:57

## 2020-06-11 RX ADMIN — HYDRALAZINE HYDROCHLORIDE PRN MG: 20 INJECTION INTRAMUSCULAR; INTRAVENOUS at 23:18

## 2020-06-11 RX ADMIN — BENAZEPRIL HYDROCHLORIDE SCH MG: 10 TABLET ORAL at 21:11

## 2020-06-11 NOTE — RADIOLOGY REPORT (SQ)
EXAM DESCRIPTION:  CHEST SINGLE VIEW



IMAGES COMPLETED DATE/TIME:  6/11/2020 9:29 am



REASON FOR STUDY:  sob congestion



COMPARISON:  Chest films 5/18/2020, 5/24/2020, 6/7/2020

CT chest 5/20/2020



EXAM PARAMETERS:  NUMBER OF VIEWS: One view.

TECHNIQUE: Single frontal radiographic view of the chest acquired.

RADIATION DOSE: NA

LIMITATIONS: Left pacemaker battery pack over the mid left chest



FINDINGS:  LUNGS AND PLEURA: Volume loss and consolidation in the right middle lobe unchanged

No gross right pleural effusion or pneumothorax

Visualized left hemithorax unremarkable

MEDIASTINUM AND HILAR STRUCTURES: Fullness right hilum unchanged

HEART AND VASCULAR STRUCTURES: Heart normal in size.  Normal vasculature.

BONES: No acute findings.

HARDWARE: None in the chest.

OTHER: No other significant finding.



IMPRESSION:  Collapse and consolidation in the right middle lobe unchanged from prior studies



TECHNICAL DOCUMENTATION:  JOB ID:  [id number]

 2011 [employer]- All Rights Reserved



Reading location - IP/workstation name: [email or web address]

## 2020-06-11 NOTE — PDOC PROGRESS REPORT
Subjective


Progress Note for:: 06/11/20


Subjective:: 





No adverse events overnight.  She had some shortness of breath that resolved 

with the nebulizer treatment.  She is comfortable sitting up in the chair on 

room air.  She had a Reich catheter out yesterday and is more comfortable as a 

result.  She starting to eat a little bit more food today.  No fevers.


Reason For Visit: 


TOXIC ENCEPHALOPATHY








Physical Exam


Vital Signs: 


                                        











Temp Pulse Resp BP Pulse Ox


 


 97.4 F   80   18   149/73 H  97 


 


 06/11/20 12:00  06/11/20 14:00  06/11/20 12:00  06/11/20 12:00  06/11/20 12:00








                                 Intake & Output











 06/10/20 06/11/20 06/12/20





 06:59 06:59 06:59


 


Intake Total 1428 2795 50


 


Output Total 900 400 


 


Balance 528 2395 50


 


Weight 68.5 kg 72 kg 








General appearance: PRESENT: disheveled, no apparent distress


Respiratory exam: PRESENT: chest wall tenderness, symmetrical, unlabored.  

ABSENT: accessory muscle use, clear to auscultation sancho, crackles, prolonged 

expiratory phas, tachypnea, wheezes


Cardiovascular exam: PRESENT: RRR - Ventricular paced on monitor, +S1, +S2


Pulses: PRESENT: normal carotid pulses


Vascular exam: PRESENT: normal capillary refill


GI/Abdominal exam: PRESENT: normal bowel sounds, soft.  ABSENT: distended, 

guarding, rebound, tenderness


Extremities exam: ABSENT: clubbing, pedal edema


Musculoskeletal exam: PRESENT: normal inspection.  ABSENT: deformity


Neurological exam: PRESENT: alert, awake, oriented to person, oriented to place.

 ABSENT: oriented to situation


Psychiatric exam: ABSENT: anxious


Skin exam: PRESENT: dry, warm





Results


Laboratory Results: 


                                        





                                 06/11/20 09:25 





                                 06/11/20 09:25 





                                        











  06/11/20 06/11/20





  09:25 09:25


 


WBC   10.2


 


RBC   3.24 L


 


Hgb   10.1 L


 


Hct   30.1 L


 


MCV   93


 


MCH   31.2


 


MCHC   33.5


 


RDW   13.9


 


Plt Count   298


 


Sodium  138.5 


 


Potassium  3.9 


 


Chloride  106 


 


Carbon Dioxide  27 


 


Anion Gap  6 


 


BUN  20 


 


Creatinine  0.58 


 


Est GFR (African Amer)  > 60 


 


Glucose  142 H 


 


Calcium  9.7 








                                        





06/09/20 18:38   Blood   Blood Culture (PCR) - Final


                            Enterococcus Species








Impressions: 


                                        





Head CT  06/07/20 14:26


IMPRESSION:  NO ACUTE INTRACRANIAL FINDINGS.Left sphenoid sinus opacification.


EVIDENCE OF ACUTE STROKE: NO.


 








Chest X-Ray  06/11/20 00:00


IMPRESSION:  Collapse and consolidation in the right middle lobe unchanged from 

prior studies


 














Assessment and Plan





- Diagnosis


(1) Toxic encephalopathy


Is this a current diagnosis for this admission?: Yes   


Plan: 


She has improved somewhat with antibiotics.  We are holding her basal pain 

medications and I am just giving her some as needed medication.  We cannot get 

an MRI because of her pacemaker.  Fortunately, she seems to be improving a 

little bit each day.








(2) Hyponatremia


Is this a current diagnosis for this admission?: Yes   


Plan: 


Resolved








(3) Acute kidney injury


Is this a current diagnosis for this admission?: Yes   


Plan: 


Resolved








(4) Opiate overdose


Qualifiers: 


   Encounter type: initial encounter   Injury intent: accidental or 

unintentional   Qualified Code(s): T40.601A - Poisoning by unspecified 

narcotics, accidental (unintentional), initial encounter   


Is this a current diagnosis for this admission?: Yes   


Plan: 


Resolved








(5) Lung cancer


Qualifiers: 


   Laterality: right   Lung location: hilum of lung   Qualified Code(s): C34.01 

- Malignant neoplasm of right main bronchus   


Is this a current diagnosis for this admission?: Yes   


Plan: 


She will resume her outpatient follow-up.  She is still waiting for the follow-

up on the biopsy and has not started any treatment yet.








(6) COPD (chronic obstructive pulmonary disease)


Qualifiers: 


   COPD type: chronic bronchitis   Chronic bronchitis type: simple   Qualified 

Code(s): J41.0 - Simple chronic bronchitis   


Is this a current diagnosis for this admission?: Yes   


Plan: 


Continue home meds, not acutely exacerbated








(7) Hypertension


Qualifiers: 


   Hypertension type: essential hypertension   Qualified Code(s): I10 - 

Essential (primary) hypertension   


Is this a current diagnosis for this admission?: Yes   


Plan: 


This is improved now that she started taking her medicines again








(8) Hypothyroidism


Qualifiers: 


   Hypothyroidism type: unspecified   Qualified Code(s): E03.9 - Hypothyroidism,

unspecified   


Is this a current diagnosis for this admission?: Yes   


Plan: 


Continue Synthroid








(9) Dehydration


Is this a current diagnosis for this admission?: Yes   


Plan: 


Resolved








(10) Bacteremia due to Enterococcus


Is this a current diagnosis for this admission?: Yes   


Plan: 


She was on Rocephin so I continue that, but I switched her over to ampicillin 

because we got susceptibilities.  Dr. Kim recommended increasing the Rocephin

to 2 g a day.  Patient will need a MYNOR, but another consideration is her 

pacemaker.  It may have to be removed and replaced if the leads are found to be 

infected.








- Time


Time Spent with patient: 25-34 minutes

## 2020-06-12 LAB
ANION GAP SERPL CALC-SCNC: 6 MMOL/L (ref 5–19)
BUN SERPL-MCNC: 15 MG/DL (ref 7–20)
CALCIUM: 9.7 MG/DL (ref 8.4–10.2)
CHLORIDE SERPL-SCNC: 109 MMOL/L (ref 98–107)
CO2 SERPL-SCNC: 24 MMOL/L (ref 22–30)
ERYTHROCYTE [DISTWIDTH] IN BLOOD BY AUTOMATED COUNT: 14.2 % (ref 11.5–14)
GLUCOSE SERPL-MCNC: 109 MG/DL (ref 75–110)
HCT VFR BLD CALC: 31 % (ref 36–47)
HGB BLD-MCNC: 10.1 G/DL (ref 12–15.5)
MCH RBC QN AUTO: 30.6 PG (ref 27–33.4)
MCHC RBC AUTO-ENTMCNC: 32.5 G/DL (ref 32–36)
MCV RBC AUTO: 94 FL (ref 80–97)
PLATELET # BLD: 345 10^3/UL (ref 150–450)
POTASSIUM SERPL-SCNC: 4.9 MMOL/L (ref 3.6–5)
RBC # BLD AUTO: 3.29 10^6/UL (ref 3.72–5.28)
WBC # BLD AUTO: 11.7 10^3/UL (ref 4–10.5)

## 2020-06-12 RX ADMIN — AMPICILLIN SCH MLS/HR: 2 INJECTION, POWDER, FOR SOLUTION INTRAVENOUS at 08:58

## 2020-06-12 RX ADMIN — MAGNESIUM OXIDE TAB 400 MG (241.3 MG ELEMENTAL MG) SCH MG: 400 (241.3 MG) TAB at 11:28

## 2020-06-12 RX ADMIN — ASPIRIN SCH MG: 81 TABLET, COATED ORAL at 11:29

## 2020-06-12 RX ADMIN — AMPICILLIN SCH MLS/HR: 2 INJECTION, POWDER, FOR SOLUTION INTRAVENOUS at 03:25

## 2020-06-12 RX ADMIN — AMPICILLIN SCH MLS/HR: 2 INJECTION, POWDER, FOR SOLUTION INTRAVENOUS at 11:30

## 2020-06-12 RX ADMIN — AMPICILLIN SCH MLS/HR: 2 INJECTION, POWDER, FOR SOLUTION INTRAVENOUS at 23:52

## 2020-06-12 RX ADMIN — NITROGLYCERIN SCH EACH: 0.4 PATCH TRANSDERMAL at 11:29

## 2020-06-12 RX ADMIN — Medication SCH: at 05:25

## 2020-06-12 RX ADMIN — Medication SCH: at 15:14

## 2020-06-12 RX ADMIN — POLYETHYLENE GLYCOL 3350 SCH GM: 17 POWDER, FOR SOLUTION ORAL at 11:30

## 2020-06-12 RX ADMIN — ACYCLOVIR SCH MG: 200 CAPSULE ORAL at 05:24

## 2020-06-12 RX ADMIN — HEPARIN SODIUM SCH UNIT: 5000 INJECTION, SOLUTION INTRAVENOUS; SUBCUTANEOUS at 15:13

## 2020-06-12 RX ADMIN — SODIUM CHLORIDE PRN MLS/HR: 9 INJECTION, SOLUTION INTRAVENOUS at 05:27

## 2020-06-12 RX ADMIN — HYDROCODONE BITARTRATE AND ACETAMINOPHEN PRN TAB: 5; 325 TABLET ORAL at 08:57

## 2020-06-12 RX ADMIN — BENAZEPRIL HYDROCHLORIDE SCH MG: 20 TABLET ORAL at 21:55

## 2020-06-12 RX ADMIN — Medication SCH ML: at 21:56

## 2020-06-12 RX ADMIN — MAGNESIUM OXIDE TAB 400 MG (241.3 MG ELEMENTAL MG) SCH MG: 400 (241.3 MG) TAB at 21:55

## 2020-06-12 RX ADMIN — ACYCLOVIR SCH MG: 200 CAPSULE ORAL at 15:13

## 2020-06-12 RX ADMIN — LEVOTHYROXINE SODIUM SCH MG: 25 TABLET ORAL at 05:24

## 2020-06-12 RX ADMIN — ACYCLOVIR SCH MG: 200 CAPSULE ORAL at 22:11

## 2020-06-12 RX ADMIN — CARVEDILOL SCH MG: 12.5 TABLET, FILM COATED ORAL at 21:55

## 2020-06-12 RX ADMIN — CARVEDILOL SCH MG: 12.5 TABLET, FILM COATED ORAL at 11:28

## 2020-06-12 RX ADMIN — FLUTICASONE FUROATE AND VILANTEROL TRIFENATATE SCH INHALER: 100; 25 POWDER RESPIRATORY (INHALATION) at 11:30

## 2020-06-12 RX ADMIN — HEPARIN SODIUM SCH UNIT: 5000 INJECTION, SOLUTION INTRAVENOUS; SUBCUTANEOUS at 21:56

## 2020-06-12 RX ADMIN — ONDANSETRON PRN MG: 4 TABLET, ORALLY DISINTEGRATING ORAL at 02:06

## 2020-06-12 RX ADMIN — HYDROCODONE BITARTRATE AND ACETAMINOPHEN PRN TAB: 5; 325 TABLET ORAL at 17:27

## 2020-06-12 RX ADMIN — SODIUM CHLORIDE PRN MLS/HR: 9 INJECTION, SOLUTION INTRAVENOUS at 20:32

## 2020-06-12 RX ADMIN — HYDRALAZINE HYDROCHLORIDE PRN MG: 20 INJECTION INTRAMUSCULAR; INTRAVENOUS at 08:58

## 2020-06-12 RX ADMIN — AMPICILLIN SCH MLS/HR: 2 INJECTION, POWDER, FOR SOLUTION INTRAVENOUS at 16:48

## 2020-06-12 RX ADMIN — HEPARIN SODIUM SCH UNIT: 5000 INJECTION, SOLUTION INTRAVENOUS; SUBCUTANEOUS at 05:25

## 2020-06-12 RX ADMIN — METHOCARBAMOL PRN MG: 500 TABLET ORAL at 20:32

## 2020-06-12 RX ADMIN — FERROUS SULFATE TAB 325 MG (65 MG ELEMENTAL FE) SCH MG: 325 (65 FE) TAB at 11:29

## 2020-06-12 RX ADMIN — CEFTRIAXONE SCH MLS/HR: 2 INJECTION, SOLUTION INTRAVENOUS at 05:24

## 2020-06-12 RX ADMIN — CEFTRIAXONE SCH MLS/HR: 2 INJECTION, SOLUTION INTRAVENOUS at 17:25

## 2020-06-12 RX ADMIN — AMPICILLIN SCH MLS/HR: 2 INJECTION, POWDER, FOR SOLUTION INTRAVENOUS at 20:32

## 2020-06-12 RX ADMIN — PANTOPRAZOLE SODIUM SCH MG: 40 TABLET, DELAYED RELEASE ORAL at 05:24

## 2020-06-12 NOTE — PDOC PROGRESS REPORT
Subjective


Progress Note for:: 06/12/20


Subjective:: 





No adverse events overnight.  No new complaints.  She has been picking at her 

food.  She has been calm and cooperative.  No fevers.  Her blood pressures have 

been elevated so we had to double up on her blood pressure medicines.


Reason For Visit: 


TOXIC ENCEPHALOPATHY








Physical Exam


Vital Signs: 


                                        











Temp Pulse Resp BP Pulse Ox


 


 97.7 F   87   23 H  185/76 H  97 


 


 06/12/20 11:44  06/12/20 14:00  06/12/20 11:44  06/12/20 11:44  06/12/20 11:44








                                 Intake & Output











 06/11/20 06/12/20 06/13/20





 06:59 06:59 06:59


 


Intake Total 2795 3110 400


 


Output Total 400  


 


Balance 2395 3110 400


 


Weight 72 kg 69.5 kg 








General appearance: PRESENT: disheveled, no apparent distress


Respiratory exam: PRESENT: chest wall tenderness, symmetrical, unlabored.  

ABSENT: accessory muscle use, clear to auscultation sancho, crackles, prolonged 

expiratory phas, tachypnea, wheezes


Cardiovascular exam: PRESENT: RRR - Ventricular paced on monitor, +S1, +S2


Pulses: PRESENT: normal carotid pulses


Vascular exam: PRESENT: normal capillary refill


GI/Abdominal exam: PRESENT: normal bowel sounds, soft.  ABSENT: distended, 

guarding, rebound, tenderness


Extremities exam: ABSENT: clubbing, pedal edema


Musculoskeletal exam: PRESENT: normal inspection.  ABSENT: deformity


Neurological exam: PRESENT: alert, awake, oriented to person, oriented to place.

 ABSENT: oriented to situation


Psychiatric exam: ABSENT: anxious


Skin exam: PRESENT: dry, warm





Results


Laboratory Results: 


                                        





                                 06/12/20 05:23 





                                 06/12/20 05:23 





                                        











  06/12/20 06/12/20





  05:23 05:23


 


WBC   11.7 H


 


RBC   3.29 L


 


Hgb   10.1 L


 


Hct   31.0 L


 


MCV   94


 


MCH   30.6


 


MCHC   32.5


 


RDW   14.2 H


 


Plt Count   345


 


Sodium  139.1 


 


Potassium  4.9  D 


 


Chloride  109 H 


 


Carbon Dioxide  24 


 


Anion Gap  6 


 


BUN  15 


 


Creatinine  0.63 


 


Est GFR (African Amer)  > 60 


 


Glucose  109 


 


Calcium  9.7 








                                        





06/09/20 18:38   Blood   Blood Culture (PCR) - Final


                            Enterococcus Species


06/09/20 18:38   Blood   Blood Culture - Final


                            Enterococcus Faecalis(Group D)








Impressions: 


                                        





Head CT  06/07/20 14:26


IMPRESSION:  NO ACUTE INTRACRANIAL FINDINGS.Left sphenoid sinus opacification.


EVIDENCE OF ACUTE STROKE: NO.


 








Chest X-Ray  06/11/20 00:00


IMPRESSION:  Collapse and consolidation in the right middle lobe unchanged from 

prior studies


 














Assessment and Plan





- Diagnosis


(1) Toxic encephalopathy


Is this a current diagnosis for this admission?: Yes   


Plan: 


She has improved somewhat with antibiotics.  We are holding her basal pain 

medications and I am just giving her some as needed medication.  We cannot get 

an MRI because of her pacemaker.  Fortunately, she seems to be improving a 

little bit each day.








(2) Hyponatremia


Is this a current diagnosis for this admission?: Yes   


Plan: 


Resolved








(3) Acute kidney injury


Is this a current diagnosis for this admission?: Yes   


Plan: 


Resolved








(4) Opiate overdose


Qualifiers: 


   Encounter type: initial encounter   Injury intent: accidental or 

unintentional   Qualified Code(s): T40.601A - Poisoning by unspecified n

arcotics, accidental (unintentional), initial encounter   


Is this a current diagnosis for this admission?: Yes   


Plan: 


Resolved








(5) Lung cancer


Qualifiers: 


   Laterality: right   Lung location: hilum of lung   Qualified Code(s): C34.01 

- Malignant neoplasm of right main bronchus   


Is this a current diagnosis for this admission?: Yes   


Plan: 


She will resume her outpatient follow-up.  She is still waiting for the follow-

up on the biopsy and has not started any treatment yet.








(6) COPD (chronic obstructive pulmonary disease)


Qualifiers: 


   COPD type: chronic bronchitis   Chronic bronchitis type: simple   Qualified 

Code(s): J41.0 - Simple chronic bronchitis   


Is this a current diagnosis for this admission?: Yes   


Plan: 


Continue home meds, not acutely exacerbated








(7) Hypertension


Qualifiers: 


   Hypertension type: essential hypertension   Qualified Code(s): I10 - 

Essential (primary) hypertension   


Is this a current diagnosis for this admission?: Yes   


Plan: 


We had to double up on her Coreg and lisinopril to get her blood pressure under 

better control.








(8) Hypothyroidism


Qualifiers: 


   Hypothyroidism type: unspecified   Qualified Code(s): E03.9 - Hypothyroidism,

unspecified   


Is this a current diagnosis for this admission?: Yes   


Plan: 


Continue Synthroid








(9) Dehydration


Is this a current diagnosis for this admission?: Yes   


Plan: 


Resolved








(10) Bacteremia due to Enterococcus


Is this a current diagnosis for this admission?: Yes   


Plan: 


She was on Rocephin so I continue that, but I switched her over to ampicillin 

because we got susceptibilities.  Dr. Kim recommended increasing the Rocephin

to 2 g a day.  Patient will need a MYNOR which should be done on Monday, but ano

ther consideration is her pacemaker.  It may have to be removed and replaced if 

the leads are found to be infected.








- Time


Time Spent with patient: 25-34 minutes

## 2020-06-13 LAB
ANION GAP SERPL CALC-SCNC: 4 MMOL/L (ref 5–19)
BUN SERPL-MCNC: 13 MG/DL (ref 7–20)
CALCIUM: 9.5 MG/DL (ref 8.4–10.2)
CHLORIDE SERPL-SCNC: 107 MMOL/L (ref 98–107)
CO2 SERPL-SCNC: 26 MMOL/L (ref 22–30)
ERYTHROCYTE [DISTWIDTH] IN BLOOD BY AUTOMATED COUNT: 14.6 % (ref 11.5–14)
GLUCOSE SERPL-MCNC: 130 MG/DL (ref 75–110)
HCT VFR BLD CALC: 29.3 % (ref 36–47)
HGB BLD-MCNC: 9.7 G/DL (ref 12–15.5)
MCH RBC QN AUTO: 31.1 PG (ref 27–33.4)
MCHC RBC AUTO-ENTMCNC: 33 G/DL (ref 32–36)
MCV RBC AUTO: 94 FL (ref 80–97)
PLATELET # BLD: 321 10^3/UL (ref 150–450)
POTASSIUM SERPL-SCNC: 4.3 MMOL/L (ref 3.6–5)
RBC # BLD AUTO: 3.11 10^6/UL (ref 3.72–5.28)
WBC # BLD AUTO: 11 10^3/UL (ref 4–10.5)

## 2020-06-13 RX ADMIN — HEPARIN SODIUM SCH UNIT: 5000 INJECTION, SOLUTION INTRAVENOUS; SUBCUTANEOUS at 21:47

## 2020-06-13 RX ADMIN — AMPICILLIN SCH MLS/HR: 2 INJECTION, POWDER, FOR SOLUTION INTRAVENOUS at 09:13

## 2020-06-13 RX ADMIN — HEPARIN SODIUM SCH UNIT: 5000 INJECTION, SOLUTION INTRAVENOUS; SUBCUTANEOUS at 16:37

## 2020-06-13 RX ADMIN — Medication SCH: at 16:53

## 2020-06-13 RX ADMIN — FERROUS SULFATE TAB 325 MG (65 MG ELEMENTAL FE) SCH MG: 325 (65 FE) TAB at 09:14

## 2020-06-13 RX ADMIN — AMPICILLIN SCH MLS/HR: 2 INJECTION, POWDER, FOR SOLUTION INTRAVENOUS at 23:42

## 2020-06-13 RX ADMIN — CEFTRIAXONE SCH MLS/HR: 2 INJECTION, SOLUTION INTRAVENOUS at 17:52

## 2020-06-13 RX ADMIN — BENAZEPRIL HYDROCHLORIDE SCH MG: 20 TABLET ORAL at 21:47

## 2020-06-13 RX ADMIN — Medication SCH: at 22:00

## 2020-06-13 RX ADMIN — ACYCLOVIR SCH MG: 200 CAPSULE ORAL at 21:47

## 2020-06-13 RX ADMIN — Medication SCH ML: at 05:24

## 2020-06-13 RX ADMIN — ACYCLOVIR SCH MG: 200 CAPSULE ORAL at 05:23

## 2020-06-13 RX ADMIN — AMPICILLIN SCH MLS/HR: 2 INJECTION, POWDER, FOR SOLUTION INTRAVENOUS at 12:23

## 2020-06-13 RX ADMIN — NITROGLYCERIN SCH EACH: 0.4 PATCH TRANSDERMAL at 09:15

## 2020-06-13 RX ADMIN — CARVEDILOL SCH MG: 12.5 TABLET, FILM COATED ORAL at 21:47

## 2020-06-13 RX ADMIN — AMPICILLIN SCH MLS/HR: 2 INJECTION, POWDER, FOR SOLUTION INTRAVENOUS at 20:11

## 2020-06-13 RX ADMIN — PANTOPRAZOLE SODIUM SCH MG: 40 TABLET, DELAYED RELEASE ORAL at 05:23

## 2020-06-13 RX ADMIN — POLYETHYLENE GLYCOL 3350 SCH: 17 POWDER, FOR SOLUTION ORAL at 09:42

## 2020-06-13 RX ADMIN — CARVEDILOL SCH MG: 12.5 TABLET, FILM COATED ORAL at 09:14

## 2020-06-13 RX ADMIN — CEFTRIAXONE SCH MLS/HR: 2 INJECTION, SOLUTION INTRAVENOUS at 05:24

## 2020-06-13 RX ADMIN — METHOCARBAMOL PRN MG: 500 TABLET ORAL at 16:38

## 2020-06-13 RX ADMIN — MAGNESIUM OXIDE TAB 400 MG (241.3 MG ELEMENTAL MG) SCH MG: 400 (241.3 MG) TAB at 21:47

## 2020-06-13 RX ADMIN — AMPICILLIN SCH MLS/HR: 2 INJECTION, POWDER, FOR SOLUTION INTRAVENOUS at 16:38

## 2020-06-13 RX ADMIN — ONDANSETRON PRN MG: 4 TABLET, ORALLY DISINTEGRATING ORAL at 09:15

## 2020-06-13 RX ADMIN — LEVOTHYROXINE SODIUM SCH MG: 25 TABLET ORAL at 05:23

## 2020-06-13 RX ADMIN — AMPICILLIN SCH MLS/HR: 2 INJECTION, POWDER, FOR SOLUTION INTRAVENOUS at 03:57

## 2020-06-13 RX ADMIN — ACYCLOVIR SCH MG: 200 CAPSULE ORAL at 16:38

## 2020-06-13 RX ADMIN — ASPIRIN SCH MG: 81 TABLET, COATED ORAL at 09:13

## 2020-06-13 RX ADMIN — FLUTICASONE FUROATE AND VILANTEROL TRIFENATATE SCH INHALER: 100; 25 POWDER RESPIRATORY (INHALATION) at 09:40

## 2020-06-13 RX ADMIN — ONDANSETRON PRN MG: 4 TABLET, ORALLY DISINTEGRATING ORAL at 16:55

## 2020-06-13 RX ADMIN — MAGNESIUM OXIDE TAB 400 MG (241.3 MG ELEMENTAL MG) SCH MG: 400 (241.3 MG) TAB at 09:13

## 2020-06-13 RX ADMIN — HYDROCODONE BITARTRATE AND ACETAMINOPHEN PRN TAB: 5; 325 TABLET ORAL at 09:15

## 2020-06-13 RX ADMIN — HEPARIN SODIUM SCH UNIT: 5000 INJECTION, SOLUTION INTRAVENOUS; SUBCUTANEOUS at 05:24

## 2020-06-13 NOTE — PDOC PROGRESS REPORT
Subjective


Progress Note for:: 06/13/20


Subjective:: 





No adverse events overnight.  No new complaints.  Vital signs have been stable. 

Blood pressures are still elevated before she gets her medication.  Appetite is 

still not great.  No fevers.


Reason For Visit: 


TOXIC ENCEPHALOPATHY








Physical Exam


Vital Signs: 


                                        











Temp Pulse Resp BP Pulse Ox


 


 97.6 F   69   20   170/76 H  95 


 


 06/13/20 11:12  06/13/20 11:12  06/13/20 07:29  06/13/20 11:12  06/13/20 11:12








                                 Intake & Output











 06/12/20 06/13/20 06/14/20





 06:59 06:59 06:59


 


Intake Total 3110 2320 340


 


Balance 3110 2320 340


 


Weight 69.5 kg 69.5 kg 








General appearance: PRESENT: disheveled, no apparent distress


Respiratory exam: PRESENT: chest wall tenderness, symmetrical, unlabored.  

ABSENT: accessory muscle use, clear to auscultation sancho, crackles, prolonged 

expiratory phas, tachypnea, wheezes


Cardiovascular exam: PRESENT: RRR - Ventricular paced on monitor, +S1, +S2


Pulses: PRESENT: normal carotid pulses


Vascular exam: PRESENT: normal capillary refill


GI/Abdominal exam: PRESENT: normal bowel sounds, soft.  ABSENT: distended, 

guarding, rebound, tenderness


Extremities exam: PRESENT: Pedal edema.  ABSENT: clubbing


Musculoskeletal exam: PRESENT: normal inspection.  ABSENT: deformity


Neurological exam: PRESENT: alert, awake, oriented to person, oriented to place.

 ABSENT: oriented to situation


Psychiatric exam: ABSENT: anxious


Skin exam: PRESENT: dry, warm





Results


Laboratory Results: 


                                        





                                 06/13/20 06:39 





                                 06/13/20 06:39 





                                        











  06/13/20 06/13/20





  06:39 06:39


 


WBC   11.0 H


 


RBC   3.11 L


 


Hgb   9.7 L


 


Hct   29.3 L


 


MCV   94


 


MCH   31.1


 


MCHC   33.0


 


RDW   14.6 H


 


Plt Count   321


 


Sodium  137.1 


 


Potassium  4.3 


 


Chloride  107 


 


Carbon Dioxide  26 


 


Anion Gap  4 L 


 


BUN  13 


 


Creatinine  0.55 


 


Est GFR (African Amer)  > 60 


 


Glucose  130 H 


 


Calcium  9.5 








                                        





06/09/20 18:38   Blood   Blood Culture (PCR) - Final


                            Enterococcus Species


06/09/20 18:38   Blood   Blood Culture - Final


                            Enterococcus Faecalis(Group D)








Impressions: 


                                        





Head CT  06/07/20 14:26


IMPRESSION:  NO ACUTE INTRACRANIAL FINDINGS.Left sphenoid sinus opacification.


EVIDENCE OF ACUTE STROKE: NO.


 








Chest X-Ray  06/11/20 00:00


IMPRESSION:  Collapse and consolidation in the right middle lobe unchanged from 

prior studies


 














Assessment and Plan





- Diagnosis


(1) Toxic encephalopathy


Is this a current diagnosis for this admission?: Yes   


Plan: 


Resolved.  She seems to be back to baseline.








(2) Hyponatremia


Is this a current diagnosis for this admission?: Yes   


Plan: 


Resolved.  She seems a little overloaded so I am going to stop her fluids and 

give her a dose of Lasix.  We will monitor her sodium levels to see if she 

starts to drop again.








(3) Acute kidney injury


Is this a current diagnosis for this admission?: Yes   


Plan: 


Resolved








(4) Opiate overdose


Qualifiers: 


   Encounter type: initial encounter   Injury intent: accidental or 

unintentional   Qualified Code(s): T40.601A - Poisoning by unspecified 

narcotics, accidental (unintentional), initial encounter   


Is this a current diagnosis for this admission?: Yes   


Plan: 


Resolved








(5) Lung cancer


Qualifiers: 


   Laterality: right   Lung location: hilum of lung   Qualified Code(s): C34.01 

- Malignant neoplasm of right main bronchus   


Is this a current diagnosis for this admission?: Yes   


Plan: 


She will resume her outpatient follow-up.  She is still waiting for the follow-

up on the biopsy and has not started any treatment yet.








(6) COPD (chronic obstructive pulmonary disease)


Qualifiers: 


   COPD type: chronic bronchitis   Chronic bronchitis type: simple   Qualified 

Code(s): J41.0 - Simple chronic bronchitis   


Is this a current diagnosis for this admission?: Yes   


Plan: 


Continue home meds, not acutely exacerbated








(7) Hypertension


Qualifiers: 


   Hypertension type: essential hypertension   Qualified Code(s): I10 - 

Essential (primary) hypertension   


Is this a current diagnosis for this admission?: Yes   


Plan: 


We had to double up on her Coreg and lisinopril to get her blood pressure under 

better control.  We will watch to see if she needs further adjustment.








(8) Hypothyroidism


Qualifiers: 


   Hypothyroidism type: unspecified   Qualified Code(s): E03.9 - Hypothyroidism,

unspecified   


Is this a current diagnosis for this admission?: Yes   


Plan: 


Continue Synthroid








(9) Dehydration


Is this a current diagnosis for this admission?: Yes   


Plan: 


Resolved








(10) Bacteremia due to Enterococcus


Is this a current diagnosis for this admission?: Yes   


Plan: 


She was on Rocephin so I continue that, but I switched her over to ampicillin 

because we got susceptibilities.  Dr. Kim recommended increasing the Rocephin

to 2 g a day.  Patient will need a MYNOR which should be done on Monday, but 

another consideration is her pacemaker.  It may have to be removed and replaced 

if the leads are found to be infected.  She will also need a PICC line on Monday

if her repeat cultures remain negative.








- Time


Time Spent with patient: 25-34 minutes

## 2020-06-14 LAB
ANION GAP SERPL CALC-SCNC: 7 MMOL/L (ref 5–19)
BUN SERPL-MCNC: 12 MG/DL (ref 7–20)
CALCIUM: 9.8 MG/DL (ref 8.4–10.2)
CHLORIDE SERPL-SCNC: 100 MMOL/L (ref 98–107)
CO2 SERPL-SCNC: 29 MMOL/L (ref 22–30)
ERYTHROCYTE [DISTWIDTH] IN BLOOD BY AUTOMATED COUNT: 14.3 % (ref 11.5–14)
GLUCOSE SERPL-MCNC: 97 MG/DL (ref 75–110)
HCT VFR BLD CALC: 30 % (ref 36–47)
HGB BLD-MCNC: 10 G/DL (ref 12–15.5)
MCH RBC QN AUTO: 31.2 PG (ref 27–33.4)
MCHC RBC AUTO-ENTMCNC: 33.4 G/DL (ref 32–36)
MCV RBC AUTO: 93 FL (ref 80–97)
PLATELET # BLD: 305 10^3/UL (ref 150–450)
POTASSIUM SERPL-SCNC: 3.5 MMOL/L (ref 3.6–5)
RBC # BLD AUTO: 3.21 10^6/UL (ref 3.72–5.28)
WBC # BLD AUTO: 9.8 10^3/UL (ref 4–10.5)

## 2020-06-14 RX ADMIN — FERROUS SULFATE TAB 325 MG (65 MG ELEMENTAL FE) SCH MG: 325 (65 FE) TAB at 10:40

## 2020-06-14 RX ADMIN — FLUTICASONE FUROATE AND VILANTEROL TRIFENATATE SCH INHALER: 100; 25 POWDER RESPIRATORY (INHALATION) at 10:43

## 2020-06-14 RX ADMIN — POLYETHYLENE GLYCOL 3350 SCH GM: 17 POWDER, FOR SOLUTION ORAL at 10:40

## 2020-06-14 RX ADMIN — Medication SCH: at 15:04

## 2020-06-14 RX ADMIN — AMPICILLIN SCH MLS/HR: 2 INJECTION, POWDER, FOR SOLUTION INTRAVENOUS at 04:24

## 2020-06-14 RX ADMIN — MAGNESIUM OXIDE TAB 400 MG (241.3 MG ELEMENTAL MG) SCH MG: 400 (241.3 MG) TAB at 23:44

## 2020-06-14 RX ADMIN — LEVOTHYROXINE SODIUM SCH MG: 25 TABLET ORAL at 05:08

## 2020-06-14 RX ADMIN — AMPICILLIN SCH MLS/HR: 2 INJECTION, POWDER, FOR SOLUTION INTRAVENOUS at 11:14

## 2020-06-14 RX ADMIN — ONDANSETRON PRN MG: 4 TABLET, ORALLY DISINTEGRATING ORAL at 05:31

## 2020-06-14 RX ADMIN — PANTOPRAZOLE SODIUM SCH MG: 40 TABLET, DELAYED RELEASE ORAL at 05:07

## 2020-06-14 RX ADMIN — MAGNESIUM OXIDE TAB 400 MG (241.3 MG ELEMENTAL MG) SCH MG: 400 (241.3 MG) TAB at 10:41

## 2020-06-14 RX ADMIN — AMPICILLIN SCH MLS/HR: 2 INJECTION, POWDER, FOR SOLUTION INTRAVENOUS at 20:55

## 2020-06-14 RX ADMIN — CARVEDILOL SCH MG: 12.5 TABLET, FILM COATED ORAL at 10:40

## 2020-06-14 RX ADMIN — CEFTRIAXONE SCH MLS/HR: 2 INJECTION, SOLUTION INTRAVENOUS at 17:39

## 2020-06-14 RX ADMIN — HEPARIN SODIUM SCH: 5000 INJECTION, SOLUTION INTRAVENOUS; SUBCUTANEOUS at 23:44

## 2020-06-14 RX ADMIN — BENAZEPRIL HYDROCHLORIDE SCH MG: 20 TABLET ORAL at 23:45

## 2020-06-14 RX ADMIN — PROBIOTIC PRODUCT - TAB SCH MG: TAB at 17:39

## 2020-06-14 RX ADMIN — ACYCLOVIR SCH MG: 200 CAPSULE ORAL at 23:46

## 2020-06-14 RX ADMIN — AMLODIPINE BESYLATE SCH MG: 10 TABLET ORAL at 10:41

## 2020-06-14 RX ADMIN — HYDRALAZINE HYDROCHLORIDE PRN MG: 20 INJECTION INTRAMUSCULAR; INTRAVENOUS at 08:19

## 2020-06-14 RX ADMIN — CARVEDILOL SCH MG: 12.5 TABLET, FILM COATED ORAL at 23:44

## 2020-06-14 RX ADMIN — Medication SCH ML: at 05:08

## 2020-06-14 RX ADMIN — ASPIRIN SCH MG: 81 TABLET, COATED ORAL at 10:40

## 2020-06-14 RX ADMIN — AMPICILLIN SCH MLS/HR: 2 INJECTION, POWDER, FOR SOLUTION INTRAVENOUS at 08:21

## 2020-06-14 RX ADMIN — HEPARIN SODIUM SCH UNIT: 5000 INJECTION, SOLUTION INTRAVENOUS; SUBCUTANEOUS at 05:07

## 2020-06-14 RX ADMIN — NITROGLYCERIN SCH EACH: 0.4 PATCH TRANSDERMAL at 10:42

## 2020-06-14 RX ADMIN — METHOCARBAMOL PRN MG: 500 TABLET ORAL at 10:40

## 2020-06-14 RX ADMIN — CEFTRIAXONE SCH MLS/HR: 2 INJECTION, SOLUTION INTRAVENOUS at 05:31

## 2020-06-14 RX ADMIN — HEPARIN SODIUM SCH UNIT: 5000 INJECTION, SOLUTION INTRAVENOUS; SUBCUTANEOUS at 15:02

## 2020-06-14 RX ADMIN — ACYCLOVIR SCH MG: 200 CAPSULE ORAL at 15:01

## 2020-06-14 RX ADMIN — PROBIOTIC PRODUCT - TAB SCH MG: TAB at 11:46

## 2020-06-14 RX ADMIN — ACYCLOVIR SCH MG: 200 CAPSULE ORAL at 05:08

## 2020-06-14 RX ADMIN — AMPICILLIN SCH MLS/HR: 2 INJECTION, POWDER, FOR SOLUTION INTRAVENOUS at 15:05

## 2020-06-14 NOTE — PDOC PROGRESS REPORT
Subjective


Progress Note for:: 06/14/20


Subjective:: 





No adverse events overnight.  No new complaints.  Vital signs have been stable. 

Blood pressures are still elevated despite increasing her medication.  Appetite 

is still not great.  No fevers.


Reason For Visit: 


TOXIC ENCEPHALOPATHY








Physical Exam


Vital Signs: 


                                        











Temp Pulse Resp BP Pulse Ox


 


 97.4 F   87   16   178/86 H  97 


 


 06/14/20 11:09  06/14/20 11:09  06/14/20 11:09  06/14/20 11:09  06/14/20 13:24








                                 Intake & Output











 06/13/20 06/14/20 06/15/20





 06:59 06:59 06:59


 


Intake Total 2320 940 320


 


Balance 2320 940 320


 


Weight 69.5 kg  








General appearance: PRESENT: disheveled, no apparent distress


Respiratory exam: PRESENT: chest wall tenderness, symmetrical, unlabored.  

ABSENT: accessory muscle use, clear to auscultation sancho, crackles, prolonged 

expiratory phas, tachypnea, wheezes


Cardiovascular exam: PRESENT: RRR - Ventricular paced on monitor, +S1, +S2


Pulses: PRESENT: normal carotid pulses


Vascular exam: PRESENT: normal capillary refill


GI/Abdominal exam: PRESENT: normal bowel sounds, soft.  ABSENT: distended, 

guarding, rebound, tenderness


Extremities exam: PRESENT: Pedal edema.  ABSENT: clubbing


Musculoskeletal exam: PRESENT: normal inspection.  ABSENT: deformity


Neurological exam: PRESENT: alert, awake, oriented to person, oriented to place,

oriented to situation


Psychiatric exam: ABSENT: anxious


Skin exam: PRESENT: dry, warm





Results


Laboratory Results: 


                                        





                                 06/14/20 06:42 





                                 06/14/20 06:42 





                                        











  06/14/20 06/14/20





  06:42 06:42


 


WBC   9.8


 


RBC   3.21 L


 


Hgb   10.0 L


 


Hct   30.0 L


 


MCV   93


 


MCH   31.2


 


MCHC   33.4


 


RDW   14.3 H


 


Plt Count   305


 


Sodium  135.8 L 


 


Potassium  3.5 L 


 


Chloride  100 


 


Carbon Dioxide  29 


 


Anion Gap  7 


 


BUN  12 


 


Creatinine  0.58 


 


Est GFR (African Amer)  > 60 


 


Glucose  97 


 


Calcium  9.8 











Impressions: 


                                        





Head CT  06/07/20 14:26


IMPRESSION:  NO ACUTE INTRACRANIAL FINDINGS.Left sphenoid sinus opacification.


EVIDENCE OF ACUTE STROKE: NO.


 








Chest X-Ray  06/11/20 00:00


IMPRESSION:  Collapse and consolidation in the right middle lobe unchanged from 

prior studies


 














Assessment and Plan





- Diagnosis


(1) Toxic encephalopathy


Is this a current diagnosis for this admission?: Yes   


Plan: 


Resolved.  She seems to be back to baseline.








(2) Hyponatremia


Is this a current diagnosis for this admission?: Yes   


Plan: 


Resolved.








(3) Acute kidney injury


Is this a current diagnosis for this admission?: Yes   


Plan: 


Resolved








(4) Opiate overdose


Qualifiers: 


   Encounter type: initial encounter   Injury intent: accidental or 

unintentional   Qualified Code(s): T40.601A - Poisoning by unspecified 

narcotics, accidental (unintentional), initial encounter   


Is this a current diagnosis for this admission?: Yes   


Plan: 


Resolved








(5) Lung cancer


Qualifiers: 


   Laterality: right   Lung location: hilum of lung   Qualified Code(s): C34.01 

- Malignant neoplasm of right main bronchus   


Is this a current diagnosis for this admission?: Yes   


Plan: 


She will resume her outpatient follow-up.  She is still waiting for the follow-

up on the biopsy and has not started any treatment yet.








(6) COPD (chronic obstructive pulmonary disease)


Qualifiers: 


   COPD type: chronic bronchitis   Chronic bronchitis type: simple   Qualified 

Code(s): J41.0 - Simple chronic bronchitis   


Is this a current diagnosis for this admission?: Yes   


Plan: 


Continue home meds, not acutely exacerbated








(7) Hypertension


Qualifiers: 


   Hypertension type: essential hypertension   Qualified Code(s): I10 - [facility]

l (primary) hypertension   


Is this a current diagnosis for this admission?: Yes   


Plan: 


I had doubled up on her Coreg and benazepril.  I am going to increase her 

benazepril again, and will add amlodipine.








(8) Hypothyroidism


Qualifiers: 


   Hypothyroidism type: unspecified   Qualified Code(s): E03.9 - Hypothyroidism,

unspecified   


Is this a current diagnosis for this admission?: Yes   


Plan: 


Continue Synthroid








(9) Dehydration


Is this a current diagnosis for this admission?: Yes   


Plan: 


Resolved








(10) Bacteremia due to Enterococcus


Is this a current diagnosis for this admission?: Yes   


Plan: 


She was on Rocephin so I continue that, but I switched her over to ampicillin 

because we got susceptibilities.  Dr. Kim recommended increasing the Rocephin

to 2 g a day.  Patient will need a MYNOR which should be done on Monday, but 

another consideration is her pacemaker.  It may have to be removed and replaced 

if the leads are found to be infected.  She will also need a PICC line on 

Monday, her repeat cultures remain negative.








- Time


Time Spent with patient: 25-34 minutes

## 2020-06-15 LAB
ANION GAP SERPL CALC-SCNC: 5 MMOL/L (ref 5–19)
BUN SERPL-MCNC: 9 MG/DL (ref 7–20)
CALCIUM: 9.7 MG/DL (ref 8.4–10.2)
CHLORIDE SERPL-SCNC: 101 MMOL/L (ref 98–107)
CO2 SERPL-SCNC: 30 MMOL/L (ref 22–30)
ERYTHROCYTE [DISTWIDTH] IN BLOOD BY AUTOMATED COUNT: 14.7 % (ref 11.5–14)
GLUCOSE SERPL-MCNC: 93 MG/DL (ref 75–110)
HCT VFR BLD CALC: 28.7 % (ref 36–47)
HGB BLD-MCNC: 9.7 G/DL (ref 12–15.5)
MCH RBC QN AUTO: 31.2 PG (ref 27–33.4)
MCHC RBC AUTO-ENTMCNC: 33.8 G/DL (ref 32–36)
MCV RBC AUTO: 93 FL (ref 80–97)
PLATELET # BLD: 293 10^3/UL (ref 150–450)
POTASSIUM SERPL-SCNC: 3.2 MMOL/L (ref 3.6–5)
RBC # BLD AUTO: 3.11 10^6/UL (ref 3.72–5.28)
WBC # BLD AUTO: 8.9 10^3/UL (ref 4–10.5)

## 2020-06-15 PROCEDURE — B548ZZA ULTRASONOGRAPHY OF SUPERIOR VENA CAVA, GUIDANCE: ICD-10-PCS | Performed by: RADIOLOGY

## 2020-06-15 PROCEDURE — B518ZZA FLUOROSCOPY OF SUPERIOR VENA CAVA, GUIDANCE: ICD-10-PCS | Performed by: RADIOLOGY

## 2020-06-15 PROCEDURE — 02HV33Z INSERTION OF INFUSION DEVICE INTO SUPERIOR VENA CAVA, PERCUTANEOUS APPROACH: ICD-10-PCS | Performed by: RADIOLOGY

## 2020-06-15 RX ADMIN — AMLODIPINE BESYLATE SCH MG: 10 TABLET ORAL at 10:28

## 2020-06-15 RX ADMIN — AMPICILLIN SCH MLS/HR: 2 INJECTION, POWDER, FOR SOLUTION INTRAVENOUS at 04:51

## 2020-06-15 RX ADMIN — AMPICILLIN SCH MLS/HR: 2 INJECTION, POWDER, FOR SOLUTION INTRAVENOUS at 23:31

## 2020-06-15 RX ADMIN — AMPICILLIN SCH MLS/HR: 2 INJECTION, POWDER, FOR SOLUTION INTRAVENOUS at 19:37

## 2020-06-15 RX ADMIN — FLUTICASONE FUROATE AND VILANTEROL TRIFENATATE SCH INHALER: 100; 25 POWDER RESPIRATORY (INHALATION) at 10:34

## 2020-06-15 RX ADMIN — PROBIOTIC PRODUCT - TAB SCH MG: TAB at 10:28

## 2020-06-15 RX ADMIN — HYDRALAZINE HYDROCHLORIDE PRN MG: 20 INJECTION INTRAMUSCULAR; INTRAVENOUS at 05:42

## 2020-06-15 RX ADMIN — NITROGLYCERIN SCH EACH: 0.4 PATCH TRANSDERMAL at 10:30

## 2020-06-15 RX ADMIN — Medication SCH: at 21:34

## 2020-06-15 RX ADMIN — CEFTRIAXONE SCH MLS/HR: 2 INJECTION, SOLUTION INTRAVENOUS at 05:28

## 2020-06-15 RX ADMIN — AMPICILLIN SCH MLS/HR: 2 INJECTION, POWDER, FOR SOLUTION INTRAVENOUS at 15:49

## 2020-06-15 RX ADMIN — ASPIRIN SCH MG: 81 TABLET, COATED ORAL at 10:28

## 2020-06-15 RX ADMIN — HYDROCODONE BITARTRATE AND ACETAMINOPHEN PRN TAB: 5; 325 TABLET ORAL at 07:48

## 2020-06-15 RX ADMIN — HEPARIN SODIUM SCH UNIT: 5000 INJECTION, SOLUTION INTRAVENOUS; SUBCUTANEOUS at 13:40

## 2020-06-15 RX ADMIN — PANTOPRAZOLE SODIUM SCH: 40 TABLET, DELAYED RELEASE ORAL at 05:23

## 2020-06-15 RX ADMIN — CARVEDILOL SCH MG: 12.5 TABLET, FILM COATED ORAL at 21:33

## 2020-06-15 RX ADMIN — LEVOTHYROXINE SODIUM SCH MG: 25 TABLET ORAL at 07:48

## 2020-06-15 RX ADMIN — MAGNESIUM OXIDE TAB 400 MG (241.3 MG ELEMENTAL MG) SCH MG: 400 (241.3 MG) TAB at 10:29

## 2020-06-15 RX ADMIN — AMPICILLIN SCH MLS/HR: 2 INJECTION, POWDER, FOR SOLUTION INTRAVENOUS at 07:53

## 2020-06-15 RX ADMIN — Medication SCH ML: at 05:28

## 2020-06-15 RX ADMIN — CEFTRIAXONE SCH MLS/HR: 2 INJECTION, SOLUTION INTRAVENOUS at 17:43

## 2020-06-15 RX ADMIN — FERROUS SULFATE TAB 325 MG (65 MG ELEMENTAL FE) SCH MG: 325 (65 FE) TAB at 10:29

## 2020-06-15 RX ADMIN — POLYETHYLENE GLYCOL 3350 SCH: 17 POWDER, FOR SOLUTION ORAL at 10:24

## 2020-06-15 RX ADMIN — PROBIOTIC PRODUCT - TAB SCH MG: TAB at 17:42

## 2020-06-15 RX ADMIN — HEPARIN SODIUM SCH: 5000 INJECTION, SOLUTION INTRAVENOUS; SUBCUTANEOUS at 22:25

## 2020-06-15 RX ADMIN — MAGNESIUM OXIDE TAB 400 MG (241.3 MG ELEMENTAL MG) SCH MG: 400 (241.3 MG) TAB at 21:33

## 2020-06-15 RX ADMIN — HEPARIN SODIUM SCH: 5000 INJECTION, SOLUTION INTRAVENOUS; SUBCUTANEOUS at 05:23

## 2020-06-15 RX ADMIN — AMPICILLIN SCH MLS/HR: 2 INJECTION, POWDER, FOR SOLUTION INTRAVENOUS at 01:01

## 2020-06-15 RX ADMIN — HEPARIN SODIUM SCH: 5000 INJECTION, SOLUTION INTRAVENOUS; SUBCUTANEOUS at 13:06

## 2020-06-15 RX ADMIN — ACYCLOVIR SCH MG: 200 CAPSULE ORAL at 07:51

## 2020-06-15 RX ADMIN — CARVEDILOL SCH MG: 12.5 TABLET, FILM COATED ORAL at 10:28

## 2020-06-15 RX ADMIN — AMPICILLIN SCH MLS/HR: 2 INJECTION, POWDER, FOR SOLUTION INTRAVENOUS at 11:19

## 2020-06-15 RX ADMIN — Medication SCH: at 13:05

## 2020-06-15 RX ADMIN — HYDROCODONE BITARTRATE AND ACETAMINOPHEN PRN TAB: 5; 325 TABLET ORAL at 13:42

## 2020-06-15 RX ADMIN — BENAZEPRIL HYDROCHLORIDE SCH MG: 20 TABLET ORAL at 21:33

## 2020-06-15 RX ADMIN — Medication SCH: at 00:37

## 2020-06-15 NOTE — RADIOLOGY REPORT (SQ)
EXAM DESCRIPTION:  PICC INSERTION



IMAGES COMPLETED DATE/TIME:  6/15/2020 9:52 am



REASON FOR STUDY:  iv access



COMPARISON:  None.



FLUOROSCOPY TIME:  27 seconds

1 images saved to PACS.



TECHNIQUE:  Fluoroscopic and ultrasound guided PICC placement.



LIMITATIONS:  None.



PROCEDURE:  After written consent and assessment were obtained, the patient was brought into the fluo
roscopy room and placed supine on the table. Ultrasound evaluation of potential access sites were per
formed. After successfully identifying a patent right basilic vein, the right upper arm was prepped a
nd draped in a sterile fashion along with the ultrasound probe. The entry site was anesthetized with 
1% lidocaine. A 21 gauge 7 cm needle was advanced through the skin and into the right basilic vein un
randy live ultrasound guidance.  An ultrasound image was saved to PACS confirming access site.  A .018 
guide wire was then inserted through the needle and into the venous system. The needle was then remov
ed and an 11 blade scalpel was used to make a 1cm skin incision.  A 5 fr peel-away sheath was advance
d over the wire and into the venous system. A measurement was then made using the existing wire and l
xavier fluoroscopic guidance. The wire was then removed and trimmed. The PICC was advanced through the p
eel-away sheath and into the venous system. The peel-away sheath was removed and the catheter was adh
ered to the patients arm with a stat lock. The catheter was then aspirated and flushed and a sterile 
bandage was placed over the access site.  A fluoroscopic spot image was saved to PACS confirming the 
catheter tip within the SVC.



IMPRESSION:  SUCCESSFUL PLACEMENT OF A 5 FR DUAL LUMEN 41 CM PICC IN THE RIGHT BASILIC VEIN.



COMMENT:  Patient medication list reviewed: Yes- Quality ID# [id number]:Eligible professional attests to doc
umenting in the medical record they obtained, updated, or reviewed the patient's current medications.
.

Quality :  Final reports for procedures using fluoroscopy that document radiation exposure gregory
sahil, or exposure time and number of fluorographic images (if radiation exposure indices are not avail
able)

Quality ID #76: The patient was prepped and draped using maximum sterile barrier technique including 
cap, mask, sterile gown, sterile gloves, a large sterile sheet, hand hygiene, and 2% Chlorhexidine fo
r cutaneous antisepsis. When ultrasound is used, sterile ultrasound techniques are followed requiring
 sterile gel and sterile probes.



TECHNICAL DOCUMENTATION:  JOB ID:  [id number]

 2011 [employer]- All Rights Reserved                           rev-5/18



Reading location - IP/workstation name: [id number]

## 2020-06-15 NOTE — PDOC PROGRESS REPORT
Subjective


Progress Note for:: 06/15/20


Subjective:: 





No adverse events overnight.  No new complaints.  Vital signs have been stable. 

Blood pressures have improved somewhat but are still somewhat elevated.  

Appetite is still not great but is getting a little better.  No fevers.  She got

a foam overlay for her mattress and says it is much more comfortable.


Reason For Visit: 


TOXIC ENCEPHALOPATHY








Physical Exam


Vital Signs: 


                                        











Temp Pulse Resp BP Pulse Ox


 


 98.0 F   68   16   177/68 H  97 


 


 06/15/20 11:12  06/15/20 14:00  06/15/20 11:12  06/15/20 11:12  06/15/20 11:12








                                 Intake & Output











 06/14/20 06/15/20 06/16/20





 06:59 06:59 06:59


 


Intake Total 940 1180 200


 


Balance 940 1180 200


 


Weight  69.8 kg 








General appearance: PRESENT: disheveled, no apparent distress


Respiratory exam: PRESENT: chest wall tenderness, symmetrical, unlabored.  

ABSENT: accessory muscle use, clear to auscultation sancho, crackles, prolonged 

expiratory phas, tachypnea, wheezes


Cardiovascular exam: PRESENT: RRR - Ventricular paced on monitor, +S1, +S2


Pulses: PRESENT: normal carotid pulses


Vascular exam: PRESENT: normal capillary refill


GI/Abdominal exam: PRESENT: normal bowel sounds, soft.  ABSENT: distended, 

guarding, rebound, tenderness


Extremities exam: PRESENT: Pedal edema.  ABSENT: clubbing


Musculoskeletal exam: PRESENT: normal inspection.  ABSENT: deformity


Neurological exam: PRESENT: alert, awake, oriented to person, oriented to place,

oriented to situation


Psychiatric exam: ABSENT: anxious


Skin exam: PRESENT: dry, warm





Results


Laboratory Results: 


                                        





                                 06/15/20 05:24 





                                 06/15/20 05:24 





                                        











  06/15/20 06/15/20





  05:24 05:24


 


WBC   8.9


 


RBC   3.11 L


 


Hgb   9.7 L


 


Hct   28.7 L


 


MCV   93


 


MCH   31.2


 


MCHC   33.8


 


RDW   14.7 H


 


Plt Count   293


 


Sodium  136.2 L 


 


Potassium  3.2 L 


 


Chloride  101 


 


Carbon Dioxide  30 


 


Anion Gap  5 


 


BUN  9 


 


Creatinine  0.69 


 


Est GFR (African Amer)  > 60 


 


Glucose  93 


 


Calcium  9.7 








                                        





06/09/20 16:39   Blood   Blood Culture - Final


                            NO GROWTH IN 5 DAYS








Impressions: 


                                        





Head CT  06/07/20 14:26


IMPRESSION:  NO ACUTE INTRACRANIAL FINDINGS.Left sphenoid sinus opacification.


EVIDENCE OF ACUTE STROKE: NO.


 








Chest X-Ray  06/11/20 00:00


IMPRESSION:  Collapse and consolidation in the right middle lobe unchanged from 

prior studies


 








PICC Line Insertion  06/15/20 00:00


IMPRESSION:  SUCCESSFUL PLACEMENT OF A 5 FR DUAL LUMEN 41 CM PICC IN THE RIGHT 

BASILIC VEIN.


 














Assessment and Plan





- Diagnosis


(1) Toxic encephalopathy


Is this a current diagnosis for this admission?: Yes   


Plan: 


Resolved.  She seems to be back to baseline.  Her mental status is comparable to

what I remember of her before she went to [facility] a few weeks ago.








(2) Hyponatremia


Is this a current diagnosis for this admission?: Yes   


Plan: 


Resolved.








(3) Acute kidney injury


Is this a current diagnosis for this admission?: Yes   


Plan: 


Resolved








(4) Opiate overdose


Qualifiers: 


   Encounter type: initial encounter   Injury intent: accidental or unintentiona

l   Qualified Code(s): T40.601A - Poisoning by unspecified narcotics, accidental

(unintentional), initial encounter   


Is this a current diagnosis for this admission?: Yes   


Plan: 


Resolved








(5) Lung cancer


Qualifiers: 


   Laterality: right   Lung location: hilum of lung   Qualified Code(s): C34.01 

- Malignant neoplasm of right main bronchus   


Is this a current diagnosis for this admission?: Yes   


Plan: 


She will resume her outpatient follow-up.  She is still waiting for the follow-

up on the biopsy and has not started any treatment yet.








(6) COPD (chronic obstructive pulmonary disease)


Qualifiers: 


   COPD type: chronic bronchitis   Chronic bronchitis type: simple   Qualified 

Code(s): J41.0 - Simple chronic bronchitis   


Is this a current diagnosis for this admission?: Yes   


Plan: 


Continue home meds, not acutely exacerbated








(7) Hypertension


Qualifiers: 


   Hypertension type: essential hypertension   Qualified Code(s): I10 - 

Essential (primary) hypertension   


Is this a current diagnosis for this admission?: Yes   


Plan: 


Her blood pressures have been very difficult to control.  She is on a high dose 

of Coreg, benazepril, and amlodipine.  If her blood pressure is not better 

controlled tomorrow, a fourth agent will need to be considered.








(8) Hypothyroidism


Qualifiers: 


   Hypothyroidism type: unspecified   Qualified Code(s): E03.9 - Hypothyroidism,

unspecified   


Is this a current diagnosis for this admission?: Yes   


Plan: 


Continue Synthroid








(9) Dehydration


Is this a current diagnosis for this admission?: Yes   


Plan: 


Resolved








(10) Bacteremia due to Enterococcus


Is this a current diagnosis for this admission?: Yes   


Plan: 


She was on Rocephin so I continue that, but I switched her over to ampicillin 

because we got susceptibilities.  Dr. Kim recommended increasing the Rocephin

to 2 g a day.  Patient will need a MYNOR which should be done on Tuesday, but 

another consideration is her pacemaker.  It may have to be removed and replaced 

if the leads are found to be infected.  She is getting a PICC line today, her 

repeat cultures remain negative.








- Time


Time Spent with patient: 25-34 minutes

## 2020-06-16 LAB
ANION GAP SERPL CALC-SCNC: 2 MMOL/L (ref 5–19)
ANION GAP SERPL CALC-SCNC: 3 MMOL/L (ref 5–19)
BUN SERPL-MCNC: 6 MG/DL (ref 7–20)
BUN SERPL-MCNC: 7 MG/DL (ref 7–20)
CALCIUM: 9.6 MG/DL (ref 8.4–10.2)
CALCIUM: 9.6 MG/DL (ref 8.4–10.2)
CHLORIDE SERPL-SCNC: 101 MMOL/L (ref 98–107)
CHLORIDE SERPL-SCNC: 104 MMOL/L (ref 98–107)
CO2 SERPL-SCNC: 31 MMOL/L (ref 22–30)
CO2 SERPL-SCNC: 33 MMOL/L (ref 22–30)
ERYTHROCYTE [DISTWIDTH] IN BLOOD BY AUTOMATED COUNT: 14.7 % (ref 11.5–14)
GLUCOSE SERPL-MCNC: 76 MG/DL (ref 75–110)
GLUCOSE SERPL-MCNC: 89 MG/DL (ref 75–110)
HCT VFR BLD CALC: 28.4 % (ref 36–47)
HGB BLD-MCNC: 9.6 G/DL (ref 12–15.5)
MCH RBC QN AUTO: 31.5 PG (ref 27–33.4)
MCHC RBC AUTO-ENTMCNC: 33.7 G/DL (ref 32–36)
MCV RBC AUTO: 93 FL (ref 80–97)
PLATELET # BLD: 290 10^3/UL (ref 150–450)
POTASSIUM SERPL-SCNC: 2.7 MMOL/L (ref 3.6–5)
POTASSIUM SERPL-SCNC: 3.6 MMOL/L (ref 3.6–5)
RBC # BLD AUTO: 3.04 10^6/UL (ref 3.72–5.28)
WBC # BLD AUTO: 9.8 10^3/UL (ref 4–10.5)

## 2020-06-16 RX ADMIN — HYDROCODONE BITARTRATE AND ACETAMINOPHEN PRN TAB: 5; 325 TABLET ORAL at 17:35

## 2020-06-16 RX ADMIN — POTASSIUM CHLORIDE SCH MLS/HR: 29.8 INJECTION, SOLUTION INTRAVENOUS at 15:10

## 2020-06-16 RX ADMIN — AMPICILLIN SCH MLS/HR: 2 INJECTION, POWDER, FOR SOLUTION INTRAVENOUS at 08:02

## 2020-06-16 RX ADMIN — FLUTICASONE FUROATE AND VILANTEROL TRIFENATATE SCH INHALER: 100; 25 POWDER RESPIRATORY (INHALATION) at 09:15

## 2020-06-16 RX ADMIN — HYDRALAZINE HYDROCHLORIDE PRN MG: 20 INJECTION INTRAMUSCULAR; INTRAVENOUS at 05:36

## 2020-06-16 RX ADMIN — ASPIRIN SCH MG: 81 TABLET, COATED ORAL at 09:12

## 2020-06-16 RX ADMIN — PROBIOTIC PRODUCT - TAB SCH MG: TAB at 09:12

## 2020-06-16 RX ADMIN — PROBIOTIC PRODUCT - TAB SCH MG: TAB at 17:29

## 2020-06-16 RX ADMIN — CEFTRIAXONE SCH MLS/HR: 2 INJECTION, SOLUTION INTRAVENOUS at 05:36

## 2020-06-16 RX ADMIN — MAGNESIUM OXIDE TAB 400 MG (241.3 MG ELEMENTAL MG) SCH MG: 400 (241.3 MG) TAB at 09:12

## 2020-06-16 RX ADMIN — NITROGLYCERIN SCH EACH: 0.4 PATCH TRANSDERMAL at 12:44

## 2020-06-16 RX ADMIN — Medication SCH UNIT: at 21:45

## 2020-06-16 RX ADMIN — HEPARIN SODIUM SCH: 5000 INJECTION, SOLUTION INTRAVENOUS; SUBCUTANEOUS at 05:46

## 2020-06-16 RX ADMIN — CARVEDILOL SCH MG: 12.5 TABLET, FILM COATED ORAL at 09:12

## 2020-06-16 RX ADMIN — Medication SCH: at 05:46

## 2020-06-16 RX ADMIN — MAGNESIUM OXIDE TAB 400 MG (241.3 MG ELEMENTAL MG) SCH MG: 400 (241.3 MG) TAB at 21:44

## 2020-06-16 RX ADMIN — CARVEDILOL SCH MG: 12.5 TABLET, FILM COATED ORAL at 21:45

## 2020-06-16 RX ADMIN — AMPICILLIN SCH MLS/HR: 2 INJECTION, POWDER, FOR SOLUTION INTRAVENOUS at 03:01

## 2020-06-16 RX ADMIN — AMPICILLIN SCH MLS/HR: 2 INJECTION, POWDER, FOR SOLUTION INTRAVENOUS at 16:57

## 2020-06-16 RX ADMIN — HYDRALAZINE HYDROCHLORIDE PRN MG: 20 INJECTION INTRAMUSCULAR; INTRAVENOUS at 17:34

## 2020-06-16 RX ADMIN — FERROUS SULFATE TAB 325 MG (65 MG ELEMENTAL FE) SCH MG: 325 (65 FE) TAB at 09:12

## 2020-06-16 RX ADMIN — LEVOTHYROXINE SODIUM SCH: 25 TABLET ORAL at 05:46

## 2020-06-16 RX ADMIN — POTASSIUM CHLORIDE SCH MLS/HR: 29.8 INJECTION, SOLUTION INTRAVENOUS at 09:11

## 2020-06-16 RX ADMIN — AMLODIPINE BESYLATE SCH MG: 10 TABLET ORAL at 09:12

## 2020-06-16 RX ADMIN — HEPARIN SODIUM SCH UNIT: 5000 INJECTION, SOLUTION INTRAVENOUS; SUBCUTANEOUS at 15:29

## 2020-06-16 RX ADMIN — POTASSIUM CHLORIDE SCH MLS/HR: 29.8 INJECTION, SOLUTION INTRAVENOUS at 10:43

## 2020-06-16 RX ADMIN — BENAZEPRIL HYDROCHLORIDE SCH MG: 20 TABLET ORAL at 21:45

## 2020-06-16 RX ADMIN — POLYETHYLENE GLYCOL 3350 SCH GM: 17 POWDER, FOR SOLUTION ORAL at 09:12

## 2020-06-16 RX ADMIN — AMPICILLIN SCH MLS/HR: 2 INJECTION, POWDER, FOR SOLUTION INTRAVENOUS at 21:44

## 2020-06-16 RX ADMIN — PANTOPRAZOLE SODIUM SCH: 40 TABLET, DELAYED RELEASE ORAL at 05:46

## 2020-06-16 RX ADMIN — Medication SCH: at 13:26

## 2020-06-16 RX ADMIN — AMPICILLIN SCH MLS/HR: 2 INJECTION, POWDER, FOR SOLUTION INTRAVENOUS at 13:26

## 2020-06-16 RX ADMIN — METHOCARBAMOL PRN MG: 500 TABLET ORAL at 15:10

## 2020-06-16 RX ADMIN — HEPARIN SODIUM SCH UNIT: 5000 INJECTION, SOLUTION INTRAVENOUS; SUBCUTANEOUS at 21:45

## 2020-06-16 NOTE — XCELERA REPORT
Study ID: [id number]

                           [address] [address]

                               Tel: [phone]

                               Fax: [phone]



                     Transesophageal Echocardiogram Report

_______________________________________________________________________________



Name: MELISSA BATISTA

MRN: [id number]                           Age: 74 yrs

Gender: Female                            : 1945

Patient Status: Inpatient                 Patient Location: [address] [place], [place] [address]

Account #: [id number]

Study Date: 2020 12:20 PM

History: Bacteremia

ICD

Accession #: [id number]

_______________________________________________________________________________



Height: 67 in        Weight: 153 lb        BSA: 1.8 m2

_______________________________________________________________________________

Reason For Study: enterococcus bacteremia

Ordering Physician: LENNOX DANIEL

Performed By: Venita Salcedo



_______________________________________________________________________________



Interpretation Summary

There is no evidence of a mass or vegetation. This does not rule out

endocarditis. Left ventricular systolic function is low normal.

Ejection Fraction = 50-55%.

The right ventricle is normal in size and function.

Three artifacts suggestive of device leads. No attached fibrinous mass or

vegetation noted.

There is mild to moderate mitral regurgitation.

There is trace tricuspid regurgitation.

Trace aortic regurgitation.

There is no evidence of a mass or vegetation. This does not rule out

endocarditis.

Trace pericardial effusion



Procedure

A complete two-dimensional transesophageal echocardiogram was performed (2D,

spectral and color flow Doppler). Informed consent for Transesophageal

Echocardiogram, and use of a contrast agent as needed, was obtained prior to

the procedure. The patient was brought to the OR in a fasting state. An

intravenous line was placed. A topical anesthetic agent was used for

oropharangeal anesthesia. A bite block was inserted. IV conscious sedation was

administered using per Anesthesia team. The patient's vital signs, including

blood pressure, heart rate, pulse oximetry and cardiac rhythm were monitored

thoughout the procedure. The transesophageal probe was passed without

difficulty. The usual views were obtained; basal, mid-esophageal, transgastric

and aortic views. The patient tolerated the procedure well without evidence of

orophangeal or esophageal trauma. Subsequent to all the images being obtained

the probe was removed with out trauma.





Left Ventricle

The left ventricle is normal in size. There is no thrombus. There is moderate

concentric left ventricular hypertrophy. Left ventricular systolic function is

low normal. Ejection Fraction = 50-55%. No regional wall motion abnormalities

noted.



Right Ventricle

The right ventricle is normal in size and function. There is a pacemaker lead

in the right ventricle.



Atria

The interatrial septum is intact with no evidence for an atrial septal defect.

No thrombus is detected in the left atrial appendage. Three artifacts

suggestive of device leads. No attached fibrinous mass or vegetation noted.



Mitral Valve

The mitral valve is normal in structure and function. There is no vegetation

seen on the mitral valve. There is mild to moderate mitral regurgitation.





Tricuspid Valve

The tricuspid valve is normal in structure and function. There is no tricuspid

valve vegetation. There is trace tricuspid regurgitation.



Aortic Valve

The aortic valve is trileaflet. The aortic valve opens well. Calcified and

sclerosed valve leaflets. There is no aortic valvular vegetation. No

hemodynamically significant valvular aortic stenosis. Trace aortic

regurgitation.



Pulmonic Valve

The pulmonic valve is not well visualized. There is no vegetation on the

pulmonic valve. There is no pulmonic valvular regurgitation.



Arteries

There is aortic root sclerosis/calcification. Mild atherosclerotic plaque(s)

in the ascending aorta. Mild atherosclerotic plaque(s) in the aortic arch.





Pericardium

Trace pericardial effusion.



_______________________________________________________________________________

_______________________________________________________________________________



Electronically signed by:      Daniel Pelaez      on 2020 03:47 PM



CC: LENNOX DANIEL Anil

## 2020-06-16 NOTE — PDOC PROGRESS REPORT
Subjective


Progress Note for:: 06/16/20


Reason For Visit: 


TOXIC ENCEPHALOPATHY








Physical Exam


Vital Signs: 


                                        











Temp Pulse Resp BP Pulse Ox


 


 97.6 F   79   16   169/78 H  98 


 


 06/16/20 15:19  06/16/20 15:19  06/16/20 15:19  06/16/20 15:19  06/16/20 15:19








                                 Intake & Output











 06/15/20 06/16/20 06/17/20





 06:59 06:59 06:59


 


Intake Total 1180 1255 978


 


Balance 1180 1255 978


 


Weight 69.8 kg 69.5 kg 











General appearance: PRESENT: no acute distress, cooperative


Neck exam: ABSENT: JVD


Respiratory exam: PRESENT: clear to auscultation sancho, unlabored.  ABSENT: 

tachypnea, wheezes


Cardiovascular exam: PRESENT: RRR, +S1, +S2.  ABSENT: tachycardia


GI/Abdominal exam: PRESENT: soft.  ABSENT: rebound, rigid, tenderness


Neurological exam: PRESENT: alert, awake, other - conversational and answers 

appropriately





Results


Laboratory Results: 


                                        





                                 06/16/20 05:30 





                                 06/16/20 17:06 





                                        











  06/16/20 06/16/20 06/16/20





  05:30 05:30 17:06


 


WBC   9.8 


 


RBC   3.04 L 


 


Hgb   9.6 L 


 


Hct   28.4 L 


 


MCV   93 


 


MCH   31.5 


 


MCHC   33.7 


 


RDW   14.7 H 


 


Plt Count   290 


 


Sodium  136.1 L   137.8


 


Potassium  2.7 L*   3.6


 


Chloride  101   104


 


Carbon Dioxide  33 H   31 H


 


Anion Gap  2 L   3 L


 


BUN  7   6 L


 


Creatinine  0.69   0.62


 


Est GFR ( Amer)  > 60   > 60


 


Glucose  89   76


 


Calcium  9.6   9.6


 


Magnesium    2.1











Impressions: 


                                        





Head CT  06/07/20 14:26


IMPRESSION:  NO ACUTE INTRACRANIAL FINDINGS.Left sphenoid sinus opacification.


EVIDENCE OF ACUTE STROKE: NO.


 








Chest X-Ray  06/11/20 00:00


IMPRESSION:  Collapse and consolidation in the right middle lobe unchanged from 

prior studies


 








PICC Line Insertion  06/15/20 00:00


IMPRESSION:  SUCCESSFUL PLACEMENT OF A 5 FR DUAL LUMEN 41 CM PICC IN THE RIGHT 

BASILIC VEIN.


 














Assessment and Plan





- Diagnosis


(1) Bacteremia due to Enterococcus


Is this a current diagnosis for this admission?: Yes   


Plan: 


Patient underwent MYNOR today which shows no evidence of vegetations on valves 

normal on cardiac leads.  Endocarditis ruled out.  Patient will require 2 weeks 

of ampicillin for treatment of enterococcus.  First noted negative culture was 

on 6/12/2020.  Have discontinued ceftriaxone.


PICC line has been placed.


We will start a plan towards discharge.


I will have physical therapy and Occupational Therapy evaluate patient.


Mental status seems appropriate at this time.








(2) Acute kidney injury


Is this a current diagnosis for this admission?: Yes   


Plan: 


Resolved








(3) Dehydration


Is this a current diagnosis for this admission?: Yes   


Plan: 


Resolved








(4) Hyponatremia


Is this a current diagnosis for this admission?: Yes   


Plan: 


Resolved.








(5) Lung cancer


Qualifiers: 


   Laterality: right   Lung location: hilum of lung   Qualified Code(s): C34.01 

- Malignant neoplasm of right main bronchus   


Is this a current diagnosis for this admission?: Yes   


Plan: 


She will resume her outpatient follow-up.  She is still waiting for the follow-

up on the biopsy and has not started any treatment yet.








(6) Toxic encephalopathy


Is this a current diagnosis for this admission?: Yes   


Plan: 


Resolved.  








- Plan Summary


Summary: 


Discussed plan with daughter





- Time


Time Spent with patient: Less than 15 minutes

## 2020-06-17 LAB
ANION GAP SERPL CALC-SCNC: 4 MMOL/L (ref 5–19)
BUN SERPL-MCNC: 6 MG/DL (ref 7–20)
CALCIUM: 9.6 MG/DL (ref 8.4–10.2)
CHLORIDE SERPL-SCNC: 104 MMOL/L (ref 98–107)
CO2 SERPL-SCNC: 30 MMOL/L (ref 22–30)
ERYTHROCYTE [DISTWIDTH] IN BLOOD BY AUTOMATED COUNT: 15.1 % (ref 11.5–14)
GLUCOSE SERPL-MCNC: 82 MG/DL (ref 75–110)
HCT VFR BLD CALC: 29 % (ref 36–47)
HGB BLD-MCNC: 9.9 G/DL (ref 12–15.5)
MCH RBC QN AUTO: 31.9 PG (ref 27–33.4)
MCHC RBC AUTO-ENTMCNC: 34 G/DL (ref 32–36)
MCV RBC AUTO: 94 FL (ref 80–97)
PLATELET # BLD: 272 10^3/UL (ref 150–450)
POTASSIUM SERPL-SCNC: 3.2 MMOL/L (ref 3.6–5)
RBC # BLD AUTO: 3.09 10^6/UL (ref 3.72–5.28)
WBC # BLD AUTO: 8.3 10^3/UL (ref 4–10.5)

## 2020-06-17 RX ADMIN — HEPARIN SODIUM SCH UNIT: 5000 INJECTION, SOLUTION INTRAVENOUS; SUBCUTANEOUS at 06:15

## 2020-06-17 RX ADMIN — SODIUM CHLORIDE SCH ML: 9 INJECTION INTRAMUSCULAR; INTRAVENOUS; SUBCUTANEOUS at 11:39

## 2020-06-17 RX ADMIN — PROBIOTIC PRODUCT - TAB SCH MG: TAB at 17:13

## 2020-06-17 RX ADMIN — Medication SCH UNIT: at 21:34

## 2020-06-17 RX ADMIN — MAGNESIUM OXIDE TAB 400 MG (241.3 MG ELEMENTAL MG) SCH MG: 400 (241.3 MG) TAB at 21:34

## 2020-06-17 RX ADMIN — AMPICILLIN SCH MLS/HR: 2 INJECTION, POWDER, FOR SOLUTION INTRAVENOUS at 10:30

## 2020-06-17 RX ADMIN — FERROUS SULFATE TAB 325 MG (65 MG ELEMENTAL FE) SCH MG: 325 (65 FE) TAB at 11:39

## 2020-06-17 RX ADMIN — CARVEDILOL SCH MG: 12.5 TABLET, FILM COATED ORAL at 21:33

## 2020-06-17 RX ADMIN — AMPICILLIN SCH MLS/HR: 2 INJECTION, POWDER, FOR SOLUTION INTRAVENOUS at 15:30

## 2020-06-17 RX ADMIN — HEPARIN SODIUM SCH UNIT: 5000 INJECTION, SOLUTION INTRAVENOUS; SUBCUTANEOUS at 13:19

## 2020-06-17 RX ADMIN — PROBIOTIC PRODUCT - TAB SCH MG: TAB at 11:37

## 2020-06-17 RX ADMIN — ASPIRIN SCH MG: 81 TABLET, COATED ORAL at 11:38

## 2020-06-17 RX ADMIN — HEPARIN SODIUM SCH UNIT: 5000 INJECTION, SOLUTION INTRAVENOUS; SUBCUTANEOUS at 21:35

## 2020-06-17 RX ADMIN — PANTOPRAZOLE SODIUM SCH MG: 40 TABLET, DELAYED RELEASE ORAL at 06:15

## 2020-06-17 RX ADMIN — BENAZEPRIL HYDROCHLORIDE SCH MG: 20 TABLET ORAL at 21:34

## 2020-06-17 RX ADMIN — CARVEDILOL SCH MG: 12.5 TABLET, FILM COATED ORAL at 11:38

## 2020-06-17 RX ADMIN — Medication SCH: at 13:16

## 2020-06-17 RX ADMIN — SODIUM CHLORIDE SCH: 9 INJECTION INTRAMUSCULAR; INTRAVENOUS; SUBCUTANEOUS at 01:44

## 2020-06-17 RX ADMIN — FLUTICASONE FUROATE AND VILANTEROL TRIFENATATE SCH INHALER: 100; 25 POWDER RESPIRATORY (INHALATION) at 11:38

## 2020-06-17 RX ADMIN — MAGNESIUM OXIDE TAB 400 MG (241.3 MG ELEMENTAL MG) SCH MG: 400 (241.3 MG) TAB at 11:39

## 2020-06-17 RX ADMIN — AMPICILLIN SCH MLS/HR: 2 INJECTION, POWDER, FOR SOLUTION INTRAVENOUS at 03:49

## 2020-06-17 RX ADMIN — Medication SCH UNIT: at 11:39

## 2020-06-17 RX ADMIN — HYDROCODONE BITARTRATE AND ACETAMINOPHEN PRN TAB: 5; 325 TABLET ORAL at 03:54

## 2020-06-17 RX ADMIN — AMPICILLIN SCH MLS/HR: 2 INJECTION, POWDER, FOR SOLUTION INTRAVENOUS at 13:19

## 2020-06-17 RX ADMIN — HYDROCODONE BITARTRATE AND ACETAMINOPHEN PRN TAB: 5; 325 TABLET ORAL at 15:25

## 2020-06-17 RX ADMIN — METHOCARBAMOL PRN MG: 500 TABLET ORAL at 22:55

## 2020-06-17 RX ADMIN — Medication SCH: at 21:35

## 2020-06-17 RX ADMIN — AMLODIPINE BESYLATE SCH MG: 10 TABLET ORAL at 11:41

## 2020-06-17 RX ADMIN — SODIUM CHLORIDE SCH ML: 9 INJECTION INTRAMUSCULAR; INTRAVENOUS; SUBCUTANEOUS at 21:35

## 2020-06-17 RX ADMIN — NITROGLYCERIN SCH EACH: 0.4 PATCH TRANSDERMAL at 11:40

## 2020-06-17 RX ADMIN — HYDRALAZINE HYDROCHLORIDE PRN MG: 20 INJECTION INTRAMUSCULAR; INTRAVENOUS at 03:49

## 2020-06-17 RX ADMIN — LEVOTHYROXINE SODIUM SCH MG: 25 TABLET ORAL at 06:14

## 2020-06-17 RX ADMIN — SPIRONOLACTONE SCH MG: 25 TABLET, FILM COATED ORAL at 21:33

## 2020-06-17 RX ADMIN — Medication SCH: at 01:44

## 2020-06-17 RX ADMIN — Medication SCH: at 06:15

## 2020-06-17 RX ADMIN — AMPICILLIN SCH MLS/HR: 2 INJECTION, POWDER, FOR SOLUTION INTRAVENOUS at 20:43

## 2020-06-17 RX ADMIN — HYDROCODONE BITARTRATE AND ACETAMINOPHEN PRN TAB: 5; 325 TABLET ORAL at 20:42

## 2020-06-17 RX ADMIN — AMPICILLIN SCH MLS/HR: 2 INJECTION, POWDER, FOR SOLUTION INTRAVENOUS at 00:00

## 2020-06-17 NOTE — PDOC PROGRESS REPORT
Subjective


Progress Note for:: 06/17/20


Subjective:: 





Patient feels well today.  Continues to feel better with every day.  Able to 

ambulate from bed to chair with some mild assistance.  Patient states that she 

will need a hospital bed which would be very helpful for her given her history 

of CHF and to help her get up more easily.


Reason For Visit: 


TOXIC ENCEPHALOPATHY








Physical Exam


Vital Signs: 


                                        











Temp Pulse Resp BP Pulse Ox


 


 97.2 F   71   16   178/58 H  99 


 


 06/17/20 11:22  06/17/20 11:22  06/17/20 11:22  06/17/20 11:22  06/17/20 11:22








                                 Intake & Output











 06/16/20 06/17/20 06/18/20





 06:59 06:59 06:59


 


Intake Total 1255 1758 586


 


Balance 1255 1758 586


 


Weight 69.5 kg 69.2 kg 











General appearance: PRESENT: no acute distress, cooperative


Neck exam: ABSENT: JVD


Respiratory exam: PRESENT: symmetrical, unlabored.  ABSENT: tachypnea


Cardiovascular exam: ABSENT: tachycardia


GI/Abdominal exam: ABSENT: ascites, distended


Extremities exam: ABSENT: joint swelling


Neurological exam: PRESENT: alert, awake


Psychiatric exam: ABSENT: agitated, anxious


Focused psych exam: ABSENT: pressured speech


Skin exam: ABSENT: jaundice





Results


Laboratory Results: 


                                        





                                 06/17/20 06:15 





                                 06/17/20 06:15 





                                        











  06/16/20 06/17/20 06/17/20





  17:06 06:15 06:15


 


WBC    8.3


 


RBC    3.09 L


 


Hgb    9.9 L


 


Hct    29.0 L


 


MCV    94


 


MCH    31.9


 


MCHC    34.0


 


RDW    15.1 H


 


Plt Count    272


 


Sodium  137.8  137.6 


 


Potassium  3.6  3.2 L 


 


Chloride  104  104 


 


Carbon Dioxide  31 H  30 


 


Anion Gap  3 L  4 L 


 


BUN  6 L  6 L 


 


Creatinine  0.62  0.66 


 


Est GFR ( Amer)  > 60  > 60 


 


Glucose  76  82 


 


Calcium  9.6  9.6 


 


Magnesium  2.1  2.2 








                                        





06/12/20 09:35   Blood   Blood Culture - Final


                            NO GROWTH IN 5 DAYS


06/12/20 09:27   Blood   Blood Culture - Final


                            NO GROWTH IN 5 DAYS








Impressions: 


                                        





Head CT  06/07/20 14:26


IMPRESSION:  NO ACUTE INTRACRANIAL FINDINGS.Left sphenoid sinus opacification.


EVIDENCE OF ACUTE STROKE: NO.


 








Chest X-Ray  06/11/20 00:00


IMPRESSION:  Collapse and consolidation in the right middle lobe unchanged from 

prior studies


 








PICC Line Insertion  06/15/20 00:00


IMPRESSION:  SUCCESSFUL PLACEMENT OF A 5 FR DUAL LUMEN 41 CM PICC IN THE RIGHT 

BASILIC VEIN.


 














Assessment and Plan





- Diagnosis


(1) Bacteremia due to Enterococcus


Is this a current diagnosis for this admission?: Yes   


Plan: 


MYNOR showed no evidence of vegetations on valves nor on cardiac leads.  

Endocarditis ruled out.  


PICC line has been placed.


Physical and occasional therapy evaluated and recommending home health


Mental status seems appropriate at this time.


Working on setting patient up for home infusions.  Discharge planning consulted 

and order placed for ampicillin 12 g per 24-hour to be given as continuous 

infusion until 6/26/2020 to complete 14 days of treatment since first negative 

blood culture.  Weekly CBC and sed rate.








(2) Uncontrolled hypertension


Is this a current diagnosis for this admission?: Yes   


Plan: 


Patient's blood pressure remains uncontrolled with systolic in the 170s.  She is

on high doses of benazepril and Coreg as well as max dose of amlodipine.


I will add and given her recurring hypokalemia, I will use Aldactone.  Continue 

to monitor blood pressure.








(3) Acute kidney injury


Is this a current diagnosis for this admission?: Yes   


Plan: 


Resolved








(4) Dehydration


Is this a current diagnosis for this admission?: Yes   


Plan: 


Resolved








(5) Hyponatremia


Is this a current diagnosis for this admission?: Yes   


Plan: 


Resolved.








(6) Lung cancer


Qualifiers: 


   Laterality: right   Lung location: hilum of lung   Qualified Code(s): C34.01 

- Malignant neoplasm of right main bronchus   


Is this a current diagnosis for this admission?: Yes   


Plan: 


She will resume her outpatient follow-up.  She is still waiting for the follow-

up on the biopsy and has not started any treatment yet.








(7) Toxic encephalopathy


Is this a current diagnosis for this admission?: Yes   


Plan: 


Resolved.  








- Time


Time Spent with patient: 15-24 minutes

## 2020-06-18 VITALS — DIASTOLIC BLOOD PRESSURE: 98 MMHG | SYSTOLIC BLOOD PRESSURE: 150 MMHG

## 2020-06-18 LAB
ANION GAP SERPL CALC-SCNC: 5 MMOL/L (ref 5–19)
BUN SERPL-MCNC: 7 MG/DL (ref 7–20)
CALCIUM: 9.5 MG/DL (ref 8.4–10.2)
CHLORIDE SERPL-SCNC: 103 MMOL/L (ref 98–107)
CO2 SERPL-SCNC: 29 MMOL/L (ref 22–30)
ERYTHROCYTE [DISTWIDTH] IN BLOOD BY AUTOMATED COUNT: 15.5 % (ref 11.5–14)
GLUCOSE SERPL-MCNC: 88 MG/DL (ref 75–110)
HCT VFR BLD CALC: 27.8 % (ref 36–47)
HGB BLD-MCNC: 9.4 G/DL (ref 12–15.5)
MCH RBC QN AUTO: 31.6 PG (ref 27–33.4)
MCHC RBC AUTO-ENTMCNC: 33.9 G/DL (ref 32–36)
MCV RBC AUTO: 93 FL (ref 80–97)
PLATELET # BLD: 237 10^3/UL (ref 150–450)
POTASSIUM SERPL-SCNC: 3.2 MMOL/L (ref 3.6–5)
RBC # BLD AUTO: 2.98 10^6/UL (ref 3.72–5.28)
WBC # BLD AUTO: 7.6 10^3/UL (ref 4–10.5)

## 2020-06-18 RX ADMIN — Medication SCH ML: at 06:25

## 2020-06-18 RX ADMIN — FLUTICASONE FUROATE AND VILANTEROL TRIFENATATE SCH INHALER: 100; 25 POWDER RESPIRATORY (INHALATION) at 09:43

## 2020-06-18 RX ADMIN — NITROGLYCERIN SCH EACH: 0.4 PATCH TRANSDERMAL at 09:49

## 2020-06-18 RX ADMIN — Medication SCH UNIT: at 09:45

## 2020-06-18 RX ADMIN — HYDROCODONE BITARTRATE AND ACETAMINOPHEN PRN TAB: 5; 325 TABLET ORAL at 03:05

## 2020-06-18 RX ADMIN — PROBIOTIC PRODUCT - TAB SCH MG: TAB at 09:44

## 2020-06-18 RX ADMIN — CARVEDILOL SCH MG: 12.5 TABLET, FILM COATED ORAL at 09:44

## 2020-06-18 RX ADMIN — SPIRONOLACTONE SCH MG: 25 TABLET, FILM COATED ORAL at 09:45

## 2020-06-18 RX ADMIN — LEVOTHYROXINE SODIUM SCH MG: 25 TABLET ORAL at 06:25

## 2020-06-18 RX ADMIN — AMPICILLIN SCH MLS/HR: 2 INJECTION, POWDER, FOR SOLUTION INTRAVENOUS at 08:41

## 2020-06-18 RX ADMIN — FERROUS SULFATE TAB 325 MG (65 MG ELEMENTAL FE) SCH MG: 325 (65 FE) TAB at 09:45

## 2020-06-18 RX ADMIN — HEPARIN SODIUM SCH UNIT: 5000 INJECTION, SOLUTION INTRAVENOUS; SUBCUTANEOUS at 06:25

## 2020-06-18 RX ADMIN — AMLODIPINE BESYLATE SCH MG: 10 TABLET ORAL at 09:45

## 2020-06-18 RX ADMIN — ASPIRIN SCH MG: 81 TABLET, COATED ORAL at 09:45

## 2020-06-18 RX ADMIN — MAGNESIUM OXIDE TAB 400 MG (241.3 MG ELEMENTAL MG) SCH MG: 400 (241.3 MG) TAB at 09:44

## 2020-06-18 RX ADMIN — HYDROCODONE BITARTRATE AND ACETAMINOPHEN PRN TAB: 5; 325 TABLET ORAL at 09:49

## 2020-06-18 RX ADMIN — AMPICILLIN SCH MLS/HR: 2 INJECTION, POWDER, FOR SOLUTION INTRAVENOUS at 03:05

## 2020-06-18 RX ADMIN — AMPICILLIN SCH MLS/HR: 2 INJECTION, POWDER, FOR SOLUTION INTRAVENOUS at 00:13

## 2020-06-18 RX ADMIN — PANTOPRAZOLE SODIUM SCH MG: 40 TABLET, DELAYED RELEASE ORAL at 06:25

## 2020-06-18 NOTE — PDOC DISCHARGE SUMMARY
Impression





- Admit/DC Date/PCP


Admission Date/Primary Care Provider: 


  06/07/20 18:12





  OLIVA HESS MD





Discharge Date: 06/18/20





- Discharge Diagnosis


(1) Bacteremia due to Enterococcus


Is this a current diagnosis for this admission?: Yes   





(2) Uncontrolled hypertension


Is this a current diagnosis for this admission?: Yes   





(3) Acute kidney injury


Is this a current diagnosis for this admission?: Yes   





(4) Dehydration


Is this a current diagnosis for this admission?: Yes   





(5) Hyponatremia


Is this a current diagnosis for this admission?: Yes   





(6) Lung cancer


Is this a current diagnosis for this admission?: Yes   





(7) Toxic encephalopathy


Is this a current diagnosis for this admission?: Yes   





- Additional Information


Resuscitation Status: Full Code


Discharge Diet: Cardiac


Discharge Activity: Activity As Tolerated


Referrals: 


[facility] [Outside]


OLIVA HESS MD [Primary Care Provider] - 06/22/20 2:00 pm


Prescriptions: 


Spironolactone [Aldactone 25 mg Tablet] 25 mg PO Q12 #60 tablet


Carvedilol [Coreg 12.5 mg Tablet] 25 mg PO Q12 30 Days


Potassium Chloride [Klor-Con M10] 20 meq PO DAILY #20


Benazepril HCl [Lotensin 20 mg Tablet] 40 mg PO QHS 30 Days  tablet


Magnesium Oxide [Mag-Ox 400 mg Tablet] 400 mg PO Q12 20 Days  tablet


Amlodipine Besylate [Norvasc 10 mg Tablet] 10 mg PO DAILY #30 tablet


Home Medications: 








Estradiol [Estrace] 1 mg PO QHS 09/18/18 


Fenofibrate Nanocrystallized [Tricor 145 mg Tablet] 145 mg PO DAILY 09/18/18 


Levothyroxine Sodium [Synthroid] 25 mcg PO Q6AM 09/18/18 


Oxycodone HCl/Acetaminophen [Percocet 7.5-325 mg Tablet] 1 tab PO Q6HP PRN 

09/18/18 


Pregabalin [Lyrica 75 mg Capsule] 75 mg PO DAILY 09/18/18 


Simvastatin [Zocor 40 mg Tablet] 40 mg PO QPM 09/18/18 


Acyclovir [Zovirax 200 mg Capsule] 200 mg PO Q8 05/18/20 


Albuterol Sulfate [Ventolin Hfa 8 gm Mdi] 1 puff IH Q4HP PRN 05/18/20 


Aspirin [Adult Low Dose Aspirin EC] 81 mg PO DAILY 05/18/20 


Betamethasone Dipropionate 1 applic TOP BID 05/18/20 


Cholecalciferol (Vitamin D3) [Vitamin D3 1000 Unit Tablet] 1,000 unit PO DAILY 

05/18/20 


Clotrimazole/Betamethasone Dip [Lotrisone Cream] 1 applic TOP BID 05/18/20 


Cyanocobalamin (Vitamin B-12) [Vitamin B-12 1000 mcg Tablet] 1,000 mcg PO DAILY 

05/18/20 


Estradiol [Estrace] 1 applic PV MOFR 05/18/20 


Ferrous Sulfate [Feosol 325 mg Tablet] 325 mg PO DAILY 05/18/20 


Lansoprazole 30 mg PO QAM 05/18/20 


Lidocaine [Lidoderm 5% (700 mg) Transdermal Patch] 1 patch TD DAILY 05/18/20 


Methocarbamol [Robaxin 500 mg Tablet] 500 mg PO QID 05/18/20 


Nitroglycerin [Nitro-Dur 10 mg (0.4MG/Hr) Transdermal Patch] 0.4 mg TD DAILY 

05/18/20 


Nitroglycerin [Nitrostat 0.4 mg (1/150 Gr) Tabs 25/Bottle] 0.4 mg SL Q5MP PRN 

05/18/20 


Nystatin [Mycostatin 500,000 Unit/5 ml Susp Udcup] 5 ml PO TID MDD SWISH AND 

SWALLOW 05/18/20 


Ondansetron [Zofran Odt 4 mg Tablet] 4 mg PO Q8HP PRN 05/18/20 


Umeclidinium Brm/Vilanterol Tr [Anoro Ellipta 62.5-25 Mcg INH] 1 puff IH DAILY 

05/18/20 


Amlodipine Besylate [Norvasc 10 mg Tablet] 10 mg PO DAILY #30 tablet 06/18/20 


Ampicillin Sodium [Omnipen Inj 2 gm Vial] 12 gm IV CONTINUOUS  vial 06/18/20 


Benazepril HCl [Lotensin 20 mg Tablet] 40 mg PO QHS 30 Days  tablet 06/18/20 


Carvedilol [Coreg 12.5 mg Tablet] 25 mg PO Q12 30 Days 06/18/20 


Magnesium Oxide [Mag-Ox 400 mg Tablet] 400 mg PO Q12 20 Days  tablet 06/18/20 


Potassium Chloride [Klor-Con M10] 20 meq PO DAILY #20 06/18/20 


Spironolactone [Aldactone 25 mg Tablet] 25 mg PO Q12 #60 tablet 06/18/20 











History of Present Illiness


History of Present Illness: 


According to admitting provider: MELISSA BATISTA is a 74 year old female with a 

history of lung cancer who was recently in this facility for hyponatremia and 

the lung mass was diagnosed at that time and determined to be the cause of the 

hyponatremia.  She was then transferred to [facility] where she get endoscopic 

bronchial ultrasound for biopsy.  She has since been discharged home.  She was 

discharged on a fair amount of pain medication.  She was brought in today at 

home for decreased responsiveness and lethargy.  In the ER she was a little bit 

dehydrated with a small elevation in her creatinine, and she was arousable but 

would immediately go back to sleep.  I gave her a very small dose of Narcan and 

she responded to it.








Hospital Course


Hospital Course: 


Patient was admitted to the hospital for treatment of toxic encephalopathy after

being found to have altered sensorium.  Her metabolic encephalopathy was thought

to be likely from her opioid use as she was on both OxyContin and Norco which 

were recently adjusted.  Her OxyContin was discontinued.  Patient blood culture 

also became positive for enterococcus.  It is possible that this bacteremia may 

have contributed to sepsis leading to altered sensorium.  Patient was started on

IV antibiotics.  She was initially started on ceftriaxone and ampicillin.  

Patient had multiple positive repeat blood cultures.  Infectious disease was 

consulted and recommended MYNOR which was performed and was negative for any 

vegetations on the heart valves or on the cardiac leads.  In the absence of 

endocarditis patient is plan for treatment with ampicillin 12 g per 24-hour 

period until 6/26/2020 to complete 14-day therapy since first negative culture 

via home infusion via PICC.  Patient also set up with home health.  Patient has 

also been started on new blood pressure medications and her blood pressure 

regimen has been adjusted due to resistant hypertension.  She is being 

discharged on Coreg, Aldactone, benazepril and amlodipine.  She also had some 

hypokalemia and hypomagnesemia and is being given some potassium and magnesium 

supplements.  Patient has been instructed to have repeat blood work done within 

2 weeks to monitor electrolytes and renal function with a primary care provider 

and to keep a close monitoring of her blood pressures.





Physical Exam


Vital Signs: 


                                        











Temp Pulse Resp BP Pulse Ox


 


 97.9 F   67   18   150/98 H  93 


 


 06/18/20 09:00  06/18/20 09:00  06/18/20 09:00  06/18/20 09:00  06/18/20 09:00








                                 Intake & Output











 06/17/20 06/18/20 06/19/20





 06:59 06:59 06:59


 


Intake Total 1758 1616 100


 


Output Total  111 


 


Balance 1758 1505 100


 


Weight 69.2 kg 75.1 kg 











General appearance: PRESENT: no acute distress, cooperative


Respiratory exam: PRESENT: symmetrical, unlabored.  ABSENT: retraction, 

tachypnea


Neurological exam: PRESENT: alert, awake





Results


Laboratory Results: 


                                        











WBC  7.6 10^3/uL (4.0-10.5)   06/18/20  05:55    


 


RBC  2.98 10^6/uL (3.72-5.28)  L  06/18/20  05:55    


 


Hgb  9.4 g/dL (12.0-15.5)  L  06/18/20  05:55    


 


Hct  27.8 % (36.0-47.0)  L  06/18/20  05:55    


 


MCV  93 fl (80-97)   06/18/20  05:55    


 


MCH  31.6 pg (27.0-33.4)   06/18/20  05:55    


 


MCHC  33.9 g/dL (32.0-36.0)   06/18/20  05:55    


 


RDW  15.5 % (11.5-14.0)  H  06/18/20  05:55    


 


Plt Count  237 10^3/uL (150-450)   06/18/20  05:55    


 


Lymph % (Auto)  Not Reportable   06/07/20  14:20    


 


Mono % (Auto)  Not Reportable   06/07/20  14:20    


 


Eos % (Auto)  Not Reportable   06/07/20  14:20    


 


Baso % (Auto)  Not Reportable   06/07/20  14:20    


 


Absolute Neuts (auto)  Not Reportable   06/07/20  14:20    


 


Absolute Lymphs (auto)  Not Reportable   06/07/20  14:20    


 


Absolute Monos (auto)  Not Reportable   06/07/20  14:20    


 


Absolute Eos (auto)  Not Reportable   06/07/20  14:20    


 


Absolute Basos (auto)  Not Reportable   06/07/20  14:20    


 


Total Counted  100   06/07/20  14:20    


 


Seg Neutrophils %  Not Reportable   06/07/20  14:20    


 


Seg Neuts % (Manual)  89 % (42-78)  H  06/07/20  14:20    


 


Lymphocytes % (Manual)  5 % (13-45)  L  06/07/20  14:20    


 


Monocytes % (Manual)  6 % (3-13)   06/07/20  14:20    


 


Eosinophils % (Manual)  0 % (0-6)   06/07/20  14:20    


 


Basophils % (Manual)  0 % (0-2)   06/07/20  14:20    


 


Abs Neuts (Manual)  10.3 10^3/uL (1.7-8.2)  H  06/07/20  14:20    


 


Abs Lymphs (Manual)  0.6 10^3/uL (0.5-4.7)   06/07/20  14:20    


 


Abs Monocytes (Manual)  0.7 10^3/uL (0.1-1.4)   06/07/20  14:20    


 


Absolute Eos (Manual)  0.0 10^3/uL (0.0-0.6)   06/07/20  14:20    


 


Abs Basophils (Manual)  0.0 10^3/uL (0.0-0.2)   06/07/20  14:20    


 


Platelet Comment  ADEQUATE   06/07/20  14:20    


 


RBC Morph Comment  NORMO-CYTIC/CHROMIC   06/07/20  14:20    


 


Sodium  136.7 mmol/L (137-145)  L  06/18/20  05:55    


 


Potassium  3.2 mmol/L (3.6-5.0)  L  06/18/20  05:55    


 


Chloride  103 mmol/L ()   06/18/20  05:55    


 


Carbon Dioxide  29 mmol/L (22-30)   06/18/20  05:55    


 


Anion Gap  5  (5-19)   06/18/20  05:55    


 


BUN  7 mg/dL (7-20)   06/18/20  05:55    


 


Creatinine  0.63 mg/dL (0.52-1.25)   06/18/20  05:55    


 


Est GFR ( Amer)  > 60  (>60)   06/18/20  05:55    


 


Est GFR (MDRD) Non-Af  > 60  (>60)   06/18/20  05:55    


 


Glucose  88 mg/dL ()   06/18/20  05:55    


 


POC Glucose  98 mg/dL ()   06/07/20  14:16    


 


Lactic Acid  0.7 mmol/L (0.7-2.1)   06/07/20  20:28    


 


Calcium  9.5 mg/dL (8.4-10.2)   06/18/20  05:55    


 


Magnesium  2.2 mg/dL (1.6-2.3)   06/17/20  06:15    


 


Total Bilirubin  1.2 mg/dL (0.2-1.3)   06/07/20  14:20    


 


Direct Bilirubin  0.5 mg/dL (0.0-0.4)  H  06/07/20  14:20    


 


Neonat Total Bilirubin  Not Reportable   06/07/20  14:20    


 


Neonat Direct Bilirubin  Not Reportable   06/07/20  14:20    


 


Neonat Indirect Bili  Not Reportable   06/07/20  14:20    


 


AST  73 U/L (14-36)  H  06/07/20  14:20    


 


ALT  49 U/L (<35)  H  06/07/20  14:20    


 


Alkaline Phosphatase  48 U/L ()   06/07/20  14:20    


 


Total Protein  6.3 g/dL (6.3-8.2)   06/07/20  14:20    


 


Albumin  3.2 g/dL (3.5-5.0)  L  06/07/20  14:20    


 


Urine Color  PETE   06/07/20  14:46    


 


Urine Appearance  CLOUDY   06/07/20  14:46    


 


Urine pH  5.0  (5.0-9.0)   06/07/20  14:46    


 


Ur Specific Gravity  1.018   06/07/20  14:46    


 


Urine Protein  100 mg/dL (NEGATIVE)  H  06/07/20  14:46    


 


Urine Glucose (UA)  NEGATIVE mg/dL (NEGATIVE)   06/07/20  14:46    


 


Urine Ketones  NEGATIVE mg/dL (NEGATIVE)   06/07/20  14:46    


 


Urine Blood  NEGATIVE  (NEGATIVE)   06/07/20  14:46    


 


Urine Nitrite (Reflex)  NEGATIVE  (NEGATIVE)   06/07/20  14:46    


 


Urine Bilirubin  SMALL  (NEGATIVE)  H  06/07/20  14:46    


 


Urine Urobilinogen  4.0 mg/dL (<2.0)  H  06/07/20  14:46    


 


Leukocyte Esterase Rfl  LARGE  (NEGATIVE)  H  06/07/20  14:46    


 


Urine RBC (Auto)  10 /HPF  06/07/20  14:46    


 


Urine Bacteria (Auto)  TRACE /HPF  06/07/20  14:46    


 


Urine WBC (Reflex)  > 182 /HPF  06/07/20  14:46    


 


Urine WBC Clumps  FEW /HPF  06/07/20  14:46    


 


Squamous Epi Cells Auto  1 /HPF  06/07/20  14:46    


 


Urine Mucus (Auto)  FEW /LPF  06/07/20  14:46    


 


Urine Ascorbic Acid  NEGATIVE  (NEGATIVE)   06/07/20  14:46    


 


Urine Opiates Screen  UNCONFIRMED POSITIVE   06/07/20  14:46    


 


Urine Methadone Screen  NEGATIVE   06/07/20  14:46    


 


Ur Barbiturates Screen  NEGATIVE   06/07/20  14:46    


 


Ur Phencyclidine Scrn  NEGATIVE   06/07/20  14:46    


 


Ur Amphetamines Screen  NEGATIVE   06/07/20  14:46    


 


U Benzodiazepines Scrn  NEGATIVE   06/07/20  14:46    


 


Urine Cocaine Screen  NEGATIVE   06/07/20  14:46    


 


U Marijuana (THC) Screen  NEGATIVE   06/07/20  14:46    


 


Serum Alcohol  < 10 mg/dL (NONE DETECTED)   06/07/20  14:20    


 


SARS-CoV-2 (PCR)  NEGATIVE  (NEGATIVE)   06/16/20  05:07    











Impressions: 


                                        





Chest X-Ray  06/07/20 14:25


IMPRESSION:  Similar right parahilar mass and postobstructive changes -airspace 

disease in the right mid lung.


 








Head CT  06/07/20 14:26


IMPRESSION:  NO ACUTE INTRACRANIAL FINDINGS.Left sphenoid sinus opacification.


EVIDENCE OF ACUTE STROKE: NO.


 








Chest X-Ray  06/11/20 00:00


IMPRESSION:  Collapse and consolidation in the right middle lobe unchanged from 

prior studies


 








PICC Line Insertion  06/15/20 00:00


IMPRESSION:  SUCCESSFUL PLACEMENT OF A 5 FR DUAL LUMEN 41 CM PICC IN THE RIGHT 

BASILIC VEIN.


 














Plan


Time Spent: Greater than 30 Minutes





Stroke


Is this a Stroke Patient?: No





Acute Heart Failure





- **


Is this a Heart Failure Patient?: No